# Patient Record
Sex: MALE | Race: WHITE | Employment: FULL TIME | URBAN - METROPOLITAN AREA
[De-identification: names, ages, dates, MRNs, and addresses within clinical notes are randomized per-mention and may not be internally consistent; named-entity substitution may affect disease eponyms.]

---

## 2017-01-10 RX ORDER — LISINOPRIL AND HYDROCHLOROTHIAZIDE 20; 12.5 MG/1; MG/1
TABLET ORAL
Qty: 90 TABLET | Refills: 0 | Status: SHIPPED | OUTPATIENT
Start: 2017-01-10 | End: 2017-02-21 | Stop reason: SDUPTHER

## 2017-02-19 DIAGNOSIS — G47.00 INSOMNIA, UNSPECIFIED TYPE: ICD-10-CM

## 2017-02-20 RX ORDER — ZOLPIDEM TARTRATE 10 MG/1
TABLET ORAL
Qty: 90 TABLET | Refills: 1 | OUTPATIENT
Start: 2017-02-20 | End: 2017-08-07 | Stop reason: SDUPTHER

## 2017-02-21 ENCOUNTER — OFFICE VISIT (OUTPATIENT)
Dept: FAMILY MEDICINE CLINIC | Age: 62
End: 2017-02-21

## 2017-02-21 VITALS
BODY MASS INDEX: 28.23 KG/M2 | HEART RATE: 60 BPM | HEIGHT: 73 IN | WEIGHT: 213 LBS | SYSTOLIC BLOOD PRESSURE: 114 MMHG | DIASTOLIC BLOOD PRESSURE: 70 MMHG

## 2017-02-21 DIAGNOSIS — I10 ESSENTIAL HYPERTENSION, BENIGN: ICD-10-CM

## 2017-02-21 DIAGNOSIS — E78.2 MIXED HYPERLIPIDEMIA: ICD-10-CM

## 2017-02-21 DIAGNOSIS — Z12.5 PROSTATE CANCER SCREENING: ICD-10-CM

## 2017-02-21 DIAGNOSIS — Z00.00 WELL ADULT EXAM: Primary | ICD-10-CM

## 2017-02-21 DIAGNOSIS — R73.01 ELEVATED FASTING GLUCOSE: ICD-10-CM

## 2017-02-21 DIAGNOSIS — M54.9 UPPER BACK PAIN: ICD-10-CM

## 2017-02-21 LAB
A/G RATIO: 2.2 (ref 1.1–2.2)
ALBUMIN SERPL-MCNC: 4.6 G/DL (ref 3.4–5)
ALP BLD-CCNC: 66 U/L (ref 40–129)
ALT SERPL-CCNC: 32 U/L (ref 10–40)
ANION GAP SERPL CALCULATED.3IONS-SCNC: 12 MMOL/L (ref 3–16)
AST SERPL-CCNC: 24 U/L (ref 15–37)
BASOPHILS ABSOLUTE: 0.1 K/UL (ref 0–0.2)
BASOPHILS RELATIVE PERCENT: 1.1 %
BILIRUB SERPL-MCNC: 0.4 MG/DL (ref 0–1)
BILIRUBIN, POC: 0
BLOOD URINE, POC: 0
BUN BLDV-MCNC: 16 MG/DL (ref 7–20)
CALCIUM SERPL-MCNC: 9.2 MG/DL (ref 8.3–10.6)
CHLORIDE BLD-SCNC: 103 MMOL/L (ref 99–110)
CHOLESTEROL, TOTAL: 137 MG/DL (ref 0–199)
CLARITY, POC: CLEAR
CO2: 27 MMOL/L (ref 21–32)
COLOR, POC: YELLOW
CREAT SERPL-MCNC: 0.7 MG/DL (ref 0.8–1.3)
EOSINOPHILS ABSOLUTE: 0.3 K/UL (ref 0–0.6)
EOSINOPHILS RELATIVE PERCENT: 4.9 %
GFR AFRICAN AMERICAN: >60
GFR NON-AFRICAN AMERICAN: >60
GLOBULIN: 2.1 G/DL
GLUCOSE BLD-MCNC: 97 MG/DL (ref 70–99)
GLUCOSE URINE, POC: 0
HCT VFR BLD CALC: 42.3 % (ref 40.5–52.5)
HDLC SERPL-MCNC: 45 MG/DL (ref 40–60)
HEMOGLOBIN: 14.4 G/DL (ref 13.5–17.5)
KETONES, POC: 0
LDL CHOLESTEROL CALCULATED: 81 MG/DL
LEUKOCYTE EST, POC: 0
LYMPHOCYTES ABSOLUTE: 1.7 K/UL (ref 1–5.1)
LYMPHOCYTES RELATIVE PERCENT: 27.5 %
MCH RBC QN AUTO: 29.3 PG (ref 26–34)
MCHC RBC AUTO-ENTMCNC: 34 G/DL (ref 31–36)
MCV RBC AUTO: 86.2 FL (ref 80–100)
MONOCYTES ABSOLUTE: 0.4 K/UL (ref 0–1.3)
MONOCYTES RELATIVE PERCENT: 6.5 %
NEUTROPHILS ABSOLUTE: 3.8 K/UL (ref 1.7–7.7)
NEUTROPHILS RELATIVE PERCENT: 60 %
NITRITE, POC: 0
PDW BLD-RTO: 13.8 % (ref 12.4–15.4)
PH, POC: 5.5
PLATELET # BLD: 193 K/UL (ref 135–450)
PMV BLD AUTO: 8.7 FL (ref 5–10.5)
POTASSIUM SERPL-SCNC: 4.3 MMOL/L (ref 3.5–5.1)
PROSTATE SPECIFIC ANTIGEN: 0.85 NG/ML (ref 0–4)
PROTEIN, POC: 0
RBC # BLD: 4.9 M/UL (ref 4.2–5.9)
SODIUM BLD-SCNC: 142 MMOL/L (ref 136–145)
SPECIFIC GRAVITY, POC: 1.03
TOTAL PROTEIN: 6.7 G/DL (ref 6.4–8.2)
TRIGL SERPL-MCNC: 54 MG/DL (ref 0–150)
UROBILINOGEN, POC: 0.2
VLDLC SERPL CALC-MCNC: 11 MG/DL
WBC # BLD: 6.3 K/UL (ref 4–11)

## 2017-02-21 PROCEDURE — 81002 URINALYSIS NONAUTO W/O SCOPE: CPT | Performed by: FAMILY MEDICINE

## 2017-02-21 PROCEDURE — 36415 COLL VENOUS BLD VENIPUNCTURE: CPT | Performed by: FAMILY MEDICINE

## 2017-02-21 PROCEDURE — 93000 ELECTROCARDIOGRAM COMPLETE: CPT | Performed by: FAMILY MEDICINE

## 2017-02-21 PROCEDURE — 99396 PREV VISIT EST AGE 40-64: CPT | Performed by: FAMILY MEDICINE

## 2017-02-21 RX ORDER — SIMVASTATIN 20 MG
20 TABLET ORAL NIGHTLY
Qty: 90 TABLET | Refills: 1 | Status: SHIPPED | OUTPATIENT
Start: 2017-02-21 | End: 2017-08-29 | Stop reason: SDUPTHER

## 2017-02-21 RX ORDER — LISINOPRIL AND HYDROCHLOROTHIAZIDE 20; 12.5 MG/1; MG/1
TABLET ORAL
Qty: 90 TABLET | Refills: 1 | Status: SHIPPED | OUTPATIENT
Start: 2017-02-21 | End: 2017-09-19 | Stop reason: SDUPTHER

## 2017-02-21 ASSESSMENT — ENCOUNTER SYMPTOMS
COUGH: 0
ABDOMINAL PAIN: 0
BACK PAIN: 1
SHORTNESS OF BREATH: 0
VOMITING: 0
DIARRHEA: 0
EYE PAIN: 0

## 2017-02-22 LAB
ESTIMATED AVERAGE GLUCOSE: 102.5 MG/DL
HBA1C MFR BLD: 5.2 %

## 2017-06-28 ENCOUNTER — TELEPHONE (OUTPATIENT)
Dept: FAMILY MEDICINE CLINIC | Age: 62
End: 2017-06-28

## 2017-08-07 DIAGNOSIS — G47.00 INSOMNIA, UNSPECIFIED TYPE: ICD-10-CM

## 2017-08-07 RX ORDER — ZOLPIDEM TARTRATE 10 MG/1
TABLET ORAL
Qty: 90 TABLET | Refills: 1 | OUTPATIENT
Start: 2017-08-07 | End: 2018-02-01 | Stop reason: SDUPTHER

## 2017-08-25 DIAGNOSIS — M79.604 BILATERAL LEG PAIN: ICD-10-CM

## 2017-08-25 DIAGNOSIS — M79.605 BILATERAL LEG PAIN: ICD-10-CM

## 2017-08-25 RX ORDER — GABAPENTIN 300 MG/1
300 CAPSULE ORAL 3 TIMES DAILY
Qty: 270 CAPSULE | Refills: 3 | Status: SHIPPED | OUTPATIENT
Start: 2017-08-25 | End: 2018-11-05 | Stop reason: SDUPTHER

## 2017-08-29 ENCOUNTER — OFFICE VISIT (OUTPATIENT)
Dept: FAMILY MEDICINE CLINIC | Age: 62
End: 2017-08-29

## 2017-08-29 VITALS
SYSTOLIC BLOOD PRESSURE: 130 MMHG | BODY MASS INDEX: 28.76 KG/M2 | WEIGHT: 215 LBS | HEART RATE: 68 BPM | DIASTOLIC BLOOD PRESSURE: 82 MMHG

## 2017-08-29 DIAGNOSIS — M54.50 ACUTE BILATERAL LOW BACK PAIN WITHOUT SCIATICA: ICD-10-CM

## 2017-08-29 DIAGNOSIS — M54.9 UPPER BACK PAIN, CHRONIC: ICD-10-CM

## 2017-08-29 DIAGNOSIS — I10 ESSENTIAL HYPERTENSION, BENIGN: Primary | ICD-10-CM

## 2017-08-29 DIAGNOSIS — E78.2 MIXED HYPERLIPIDEMIA: ICD-10-CM

## 2017-08-29 DIAGNOSIS — R73.01 ELEVATED FASTING GLUCOSE: ICD-10-CM

## 2017-08-29 DIAGNOSIS — M54.2 NECK PAIN ON LEFT SIDE: ICD-10-CM

## 2017-08-29 DIAGNOSIS — G89.29 UPPER BACK PAIN, CHRONIC: ICD-10-CM

## 2017-08-29 LAB
ANION GAP SERPL CALCULATED.3IONS-SCNC: 15 MMOL/L (ref 3–16)
BUN BLDV-MCNC: 16 MG/DL (ref 7–20)
CALCIUM SERPL-MCNC: 9.5 MG/DL (ref 8.3–10.6)
CHLORIDE BLD-SCNC: 104 MMOL/L (ref 99–110)
CHOLESTEROL, TOTAL: 156 MG/DL (ref 0–199)
CO2: 26 MMOL/L (ref 21–32)
CREAT SERPL-MCNC: 0.7 MG/DL (ref 0.8–1.3)
GFR AFRICAN AMERICAN: >60
GFR NON-AFRICAN AMERICAN: >60
GLUCOSE BLD-MCNC: 104 MG/DL (ref 70–99)
HDLC SERPL-MCNC: 44 MG/DL (ref 40–60)
LDL CHOLESTEROL CALCULATED: 93 MG/DL
POTASSIUM SERPL-SCNC: 4.7 MMOL/L (ref 3.5–5.1)
SODIUM BLD-SCNC: 145 MMOL/L (ref 136–145)
TRIGL SERPL-MCNC: 97 MG/DL (ref 0–150)
VLDLC SERPL CALC-MCNC: 19 MG/DL

## 2017-08-29 PROCEDURE — 99214 OFFICE O/P EST MOD 30 MIN: CPT | Performed by: FAMILY MEDICINE

## 2017-08-29 PROCEDURE — 36415 COLL VENOUS BLD VENIPUNCTURE: CPT | Performed by: FAMILY MEDICINE

## 2017-08-29 RX ORDER — SIMVASTATIN 20 MG
20 TABLET ORAL NIGHTLY
Qty: 90 TABLET | Refills: 1 | Status: SHIPPED | OUTPATIENT
Start: 2017-08-29 | End: 2018-02-23 | Stop reason: SDUPTHER

## 2017-08-29 ASSESSMENT — ENCOUNTER SYMPTOMS
COUGH: 0
SHORTNESS OF BREATH: 0
NAUSEA: 0
BACK PAIN: 1
EYE PAIN: 0
DIARRHEA: 0
ABDOMINAL PAIN: 0

## 2017-08-30 LAB
ESTIMATED AVERAGE GLUCOSE: 105.4 MG/DL
HBA1C MFR BLD: 5.3 %

## 2017-09-19 DIAGNOSIS — I10 ESSENTIAL HYPERTENSION, BENIGN: ICD-10-CM

## 2017-09-20 RX ORDER — LISINOPRIL AND HYDROCHLOROTHIAZIDE 20; 12.5 MG/1; MG/1
TABLET ORAL
Qty: 90 TABLET | Refills: 1 | Status: SHIPPED | OUTPATIENT
Start: 2017-09-20 | End: 2018-03-19 | Stop reason: SDUPTHER

## 2017-09-22 ENCOUNTER — OFFICE VISIT (OUTPATIENT)
Dept: ORTHOPEDIC SURGERY | Age: 62
End: 2017-09-22

## 2017-09-22 VITALS
BODY MASS INDEX: 29.11 KG/M2 | HEIGHT: 72 IN | SYSTOLIC BLOOD PRESSURE: 133 MMHG | DIASTOLIC BLOOD PRESSURE: 85 MMHG | WEIGHT: 214.95 LBS | HEART RATE: 62 BPM

## 2017-09-22 DIAGNOSIS — S29.019A THORACIC MYOFASCIAL STRAIN, INITIAL ENCOUNTER: ICD-10-CM

## 2017-09-22 DIAGNOSIS — M79.18 CERVICAL MYOFASCIAL PAIN SYNDROME: ICD-10-CM

## 2017-09-22 DIAGNOSIS — M54.2 CERVICAL PAIN (NECK): Primary | ICD-10-CM

## 2017-09-22 PROCEDURE — 99203 OFFICE O/P NEW LOW 30 MIN: CPT | Performed by: PHYSICAL MEDICINE & REHABILITATION

## 2017-09-22 PROCEDURE — 72070 X-RAY EXAM THORAC SPINE 2VWS: CPT | Performed by: PHYSICAL MEDICINE & REHABILITATION

## 2017-09-22 PROCEDURE — 72050 X-RAY EXAM NECK SPINE 4/5VWS: CPT | Performed by: PHYSICAL MEDICINE & REHABILITATION

## 2017-10-02 ENCOUNTER — HOSPITAL ENCOUNTER (OUTPATIENT)
Dept: PHYSICAL THERAPY | Age: 62
Discharge: HOME | End: 2017-10-02
Admitting: PHYSICAL MEDICINE & REHABILITATION

## 2017-10-02 ENCOUNTER — HOSPITAL ENCOUNTER (OUTPATIENT)
Dept: PHYSICAL THERAPY | Age: 62
Discharge: HOME | End: 2017-09-30
Admitting: PHYSICAL MEDICINE & REHABILITATION

## 2017-10-02 NOTE — FLOWSHEET NOTE
[facility],  [address] [place]  Phone: [phone]  Fax [phone]      Physical Therapy Daily Treatment Note  Date:  10/2/2017    Patient Name:  Francisca Marie    :  1955  MRN: [id number]  Restrictions/Precautions:    Medical/Treatment Diagnosis Information:  · Diagnosis: Neck Pain (M54.2)  · Treatment Diagnosis: Poor Posture (R29.3), Neck pain (M54.2), thoracic pain (C96.4)  Insurance/Certification information:  PT Insurance Information: [place]  Physician Information:  Referring Practitioner: Roxann [place] of care signed (Y/N):     Date of Patient follow up with Physician:     G-Code (if applicable):      Date G-Code Applied:    PT G-Codes  Functional Assessment Tool Used: NDI  Score: 24%  Functional Limitation: Changing and maintaining body position  Changing and Maintaining Body Position Current Status (): At least 20 percent but less than 40 percent impaired, limited or restricted  Changing and Maintaining Body Position Goal Status ():  At least 1 percent but less than 20 percent impaired, limited or restricted    Progress Note: [x]  Yes  []  No  Next due by: Visit #10      Latex Allergy:  [x]NO      []YES  Preferred Language for Healthcare:   [x]English       []other:    Visit # Insurance Allowable   1 60     Pain level:  2-6/10     SUBJECTIVE:  See eval    OBJECTIVE: See eval  Observation:   Test measurements:      RESTRICTIONS/PRECAUTIONS: None   Muscle  Needle Size Technique Notes IES   Site 1 Right upper trap x 2 0.20 x 40mm [x] Pistoning / []  Threading  []  Winding/Coning Tolerated well []    Site 2 Left upper trap x 2 0.20 x 40mm [x] Pistoning / []  Threading  []  Winding/Coning LTRs; tolerated well  []    Site 3                    [] Pistoning / []  Threading  []  Winding/Coning  []    Site 4                    [] Pistoning / []  Threading  []  Winding/Coning  []    Site 5 [] (78124) Provided verbal/tactile cueing for activities related to improving balance, coordination, kinesthetic sense, posture, motor skill, proprioception  to assist with cervical, scapular, scapulothoracic and UE control with self care, reaching, carrying, lifting, house/yardwork, driving/computer work. Therapeutic Activities:    [] (37035 or 51589) Provided verbal/tactile cueing for activities related to improving balance, coordination, kinesthetic sense, posture, motor skill, proprioception and motor activation to allow for proper function of cervical, scapular, scapulothoracic and UE control with self care, carrying, lifting, driving/computer work.      Home Exercise Program:    [x] (70786) Reviewed/Progressed HEP activities related to strengthening, flexibility, endurance, ROM of cervical, scapular, scapulothoracic and UE control with self care, reaching, carrying, lifting, house/yardwork, driving/computer work  [] (87948) Reviewed/Progressed HEP activities related to improving balance, coordination, kinesthetic sense, posture, motor skill, proprioception of cervical, scapular, scapulothoracic and UE control with self care, reaching, carrying, lifting, house/yardwork, driving/computer work      Manual Treatments:  PROM / STM / Oscillations-Mobs:  G-I, II, III, IV (PA's, Inf., Post.)  [x] (67132) Provided manual therapy to mobilize soft tissue/joints of cervical/CT, scapular GHJ and UE for the purpose of decreasing headache, modulating pain, promoting relaxation,  increasing ROM, reducing/eliminating soft tissue swelling/inflammation/restriction, improving soft tissue extensibility and allowing for proper ROM for normal function with self care, reaching, carrying, lifting, house/yardwork, driving/computer work    Modalities:  IFC/HP x 15' to lower c spine/upper t spine while seated     Charges:  Timed Code Treatment Minutes: 30'   Total Treatment Minutes: 79'     [x] RADAMESAL (LOW) 83148 (typically 20 minutes

## 2017-10-02 NOTE — PLAN OF CARE
[facility], [address]  Phone: [phone]  Fax [phone]   Physical Therapy Certification    Dear Referring Practitioner: Jessi Urrutia,    We had the pleasure of evaluating the following patient for physical therapy services at [address] [place] [address]. A summary of our findings can be found in the initial assessment below. This includes our plan of care. If you have any questions or concerns regarding these findings, please do not hesitate to contact me at the office phone number checked above. Thank you for the referral.       Physician Signature:_______________________________Date:__________________  By signing above (or electronic signature), therapists plan is approved by physician    Patient: Kulwant Rg   : 1955   MRN: [id number]  Referring Physician: Referring Practitioner: Jessi Urrutia      Evaluation Date: 10/2/2017      Medical Diagnosis Information:  Diagnosis: Neck Pain (M54.2)                                             Insurance information: PT Insurance Information: [place]    Precautions/ Contra-indications: None  Latex Allergy:  [x]NO      []YES  Preferred Language for Healthcare:   [x]English       []other:    SUBJECTIVE: Patient stated complaint: Patient reports he has had pain in his neck for about 5-6 years that has gotten worse the past year. 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 Pain located on the left side of his neck and between his shoulder blades. Does not radiate down his arm. Has tried PT, chiropractor, and injections without relief.      Relevant Medical History: C5-C6 fusion  Functional Disability Index:      Pain Scale: 2-6/10  Easing factors: gabapentin  Provocative factors: lifting and carrying things, yardwork, sitting for a long period of time    Type: [x]Constant   []Intermittent []Radiating []Localized []other:     Numbness/Tingling: Denies N/T    Occupation/School: Desk Job     Living Status/Prior Level of Function: Independent with ADLs and IADLs, runs 4-5 miles per day on the treadmill; does yard work    OBJECTIVE:     CERV ROM     Cervical Flexion 25    Cervical Extension 30    Cervical SB 22 16   Cervical rotation 37 40        ROM Left Right   Shoulder Flex 165 165   Shoulder Abd 165 165   Shoulder ER T3 functional T3 functional   Shoulder IR [facility] WFL             Strength  Left Right   Shoulder Flex 4+/5 4+/5   Shoulder Scap 4+/5 4+/5   Shoulder ER/IR 4+/5 4+/5   Elbow flex/ext 4+/5 4+/5   Wrist flex/ext 4+/5 4+/5        Reflexes Left Right   C5-6 Biceps     C5-6 Brachioradialis     C7-8 Triceps       Reflexes/Sensation:    [x]Dermatomes/Myotomes intact    [x]Reflexes equal and normal bilaterally   []Other:    Joint mobility:    []Normal    [x]Hypo; decreased throughout thoracic spine and lower cervical spine; decreased rib mobility on the left. Pain with PAs to mid thoracic spine; decreased mobility B GH joints   []Hyper    Palpation: TTP upper trap, thoracic paraspinals, rhomboids L>R    Functional Mobility/Transfers: Independent    Posture: Poor; forward head, rounded shoulders    Bandages/Dressings/Incisions: NA    Gait: (include devices/WB status): WNL     Orthopedic Special Tests: Spurlings -, Quadrant -                       [x] Patient history, allergies, meds reviewed. Medical chart reviewed. See intake form. Review Of Systems (ROS):  [x]Performed Review of systems (Integumentary, CardioPulmonary, Neurological) by intake and observation. Intake form has been scanned into medical record. Patient has been instructed to contact their primary care physician regarding ROS issues if not already being addressed at this time.       Co-morbidities/Complexities (which will affect course of rehabilitation):   [x]None           Arthritic conditions   []Rheumatoid arthritis movements with/without dizziness   []Abnormal reflexes/sensation/myotomal/dermatomal deficits   [x]Decreased DCF control or ability to hold head up   [x]Decreased RC/scapular/core strength and neuromuscular control    []Decreased UE functional strength   []other:      Functional Activity Limitations (from functional questionnaire and intake)   [x]Reduced ability to tolerate prolonged functional positions   []Reduced ability or difficulty with changes of positions or transfers between positions   [x]Reduced ability to maintain good posture and demonstrate good body mechanics with sitting, bending, and lifting   [x] Reduced ability or tolerance with driving and/or computer work   [x]Reduced ability to perform lifting, reaching, carrying tasks   []Reduced ability to concentrate   []Reduced ability to sleep    [x]Reduced ability to tolerate any impact through UE or spine   []Reduced ability to ambulate prolonged functional periods/distances   []other:    Participation Restrictions   []Reduced participation in self care activities   [x]Reduced participation in home management activities   [x]Reduced participation in work activities   [x]Reduced participation in social activities. [x]Reduced participation in sport/recreational activities.     Classification/Subgrouping:   [x]signs/symptoms consistent with neck pain with mobility deficits     []signs/symptoms consistent with neck pain with movement coordinated impairments    []signs/symptoms consistent with neck pain with radiating pain    []signs/symptoms consistent with neck pain with headaches (cervicogenic)    []Signs/symptoms consistent with nerve root involvement including myotome & dermatome dysfunction   []sign/symptoms consistent with facet dysfunction of cervical and thoracic spine    []signs/symptoms consistent suggesting central cord compression/UMN syndromes   []signs/symptoms consistent with discogenic cervical pain   []signs/symptoms consistent with rib dysfunction   [x]signs/symptoms consistent with postural dysfunction   []signs/symptoms consistent with shoulder pathology    []signs/symptoms consistent with post-surgical status including decreased ROM, strength and function. [x]signs/symptoms consistent with pathology which may benefit from Dry Needling   []signs/symptoms which may limit the use of advanced manual therapy techniques: (Elevated CV risk profile, recent trauma, intolerance to end range positions, prior TIA, visual issues, UE neurological compromise )     Prognosis/Rehab Potential:      []Excellent   []Good    [x]Fair   []Poor    Tolerance of evaluation/treatment:    []Excellent   []Good    [x]Fair   []Poor    Physical Therapy Evaluation Complexity Justification  [x] A history of present problem with:  [x] no personal factors and/or comorbidities that impact the plan of care;  []1-2 personal factors and/or comorbidities that impact the plan of care  []3 personal factors and/or comorbidities that impact the plan of care  [x] An examination of body systems using standardized tests and measures addressing any of the following: body structures and functions (impairments), activity limitations, and/or participation restrictions;:  [] a total of 1-2 or more elements   [] a total of 3 or more elements   [x] a total of 4 or more elements   [x] A clinical presentation with:  [x] stable and/or uncomplicated characteristics   [] evolving clinical presentation with changing characteristics  [] unstable and unpredictable characteristics;   [x] Clinical decision making of [x] low, [] moderate, [] high complexity using standardized patient assessment instrument and/or measurable assessment of functional outcome.     [x] EVAL (LOW) 49382 (typically 20 minutes face-to-face)  [] EVAL (MOD) 38398 (typically 30 minutes face-to-face)  [] EVAL (HIGH) 18746 (typically 45 minutes face-to-face)  [] RE-EVAL     PLAN:   Frequency/Duration:  2 days per week for 8 Weeks:  Interventions:  [x]  Therapeutic exercise including: strength training, ROM, for cervical spine,scapula, core and Upper extremity, including postural re-education. [x]  NMR activation and proprioception for Deep cervical flexors, periscapular and RC muscles and Core, including postural re-education. [x]  Manual therapy as indicated for C/T spine, ribs, Soft tissue to include: Dry Needling/IASTM, STM, PROM, Gr I-IV mobilizations, manipulation. [x] Modalities as needed that may include: thermal agents, E-stim, Biofeedback, US, iontophoresis as indicated  [x] Patient education on joint protection, postural re-education, activity modification, progression of HEP. HEP instruction: Handout given to patient this date (see flowsheet)    GOALS:  Patient stated goal: \"To have less pain. \"    Therapist goals for Patient:   Short Term Goals: To be achieved in: 2 weeks  1. Independent in HEP and progression per patient tolerance, in order to prevent re-injury. 2. Patient will have a decrease in pain to facilitate improvement in movement, function, and ADLs as indicated by Functional Deficits. Long Term Goals: To be achieved in: 8 weeks  1. Disability index score of 12% or less for the NDI to assist with reaching prior level of function. 2. Patient will demonstrate increased AROM of cervical spine SB to 30 degrees bilaterally and cervical rotation 55 deg bilaterally to allow for proper joint functioning as indicated by patients Functional Deficits. 3. Patient will demonstrate an increase in postural awareness and control and activation of  Deep cervical stabilizers to allow for proper functional mobility as indicated by patients Functional Deficits. 4. Patient will be able to sit for 2 hours without an onset of pain between his shoulder blades. (patient specific functional goal)     5. Patient will be able to return to upper body strength training.     Electronically signed by:  Philip Jin PT, DPT [id number]

## 2017-10-06 ENCOUNTER — HOSPITAL ENCOUNTER (OUTPATIENT)
Dept: PHYSICAL THERAPY | Age: 62
Discharge: HOME | End: 2017-10-06
Admitting: PHYSICAL MEDICINE & REHABILITATION

## 2017-10-06 NOTE — FLOWSHEET NOTE
Chin tuck w lift      Chin tuck w horiz add w/ pilates ring 5\" 10 x 2 Holding tuck through whole second set   Doorway pec stretch 30\" 3    No money w/ OVL 2 10 x 5\"    Manual Intervention      Cerv mobs/manip      Thoracic mobs/manip 5'     CT manip      Rib mobilizations      STM upper traps 5'               NMR re-education      T-spine Ext- foam roll      Chin tucks                         Traction                      Therapeutic Exercise and NMR EXR  [x] (68194) Provided verbal/tactile cueing for activities related to strengthening, flexibility, endurance, ROM  for improvements in cervical, postural, scapular, scapulothoracic and UE control with self care, reaching, carrying, lifting, house/yardwork, driving/computer work.    [] (67211) Provided verbal/tactile cueing for activities related to improving balance, coordination, kinesthetic sense, posture, motor skill, proprioception  to assist with cervical, scapular, scapulothoracic and UE control with self care, reaching, carrying, lifting, house/yardwork, driving/computer work. Therapeutic Activities:    [] (61462 or 32139) Provided verbal/tactile cueing for activities related to improving balance, coordination, kinesthetic sense, posture, motor skill, proprioception and motor activation to allow for proper function of cervical, scapular, scapulothoracic and UE control with self care, carrying, lifting, driving/computer work.      Home Exercise Program:    [x] (75111) Reviewed/Progressed HEP activities related to strengthening, flexibility, endurance, ROM of cervical, scapular, scapulothoracic and UE control with self care, reaching, carrying, lifting, house/yardwork, driving/computer work  [] (50523) Reviewed/Progressed HEP activities related to improving balance, coordination, kinesthetic sense, posture, motor skill, proprioception of cervical, scapular, scapulothoracic and UE control with self care, reaching, carrying, lifting, house/yardwork, driving/computer work      Manual Treatments:  PROM / STM / Oscillations-Mobs:  G-I, II, III, IV (PA's, Inf., Post.)  [x] (80178) Provided manual therapy to mobilize soft tissue/joints of cervical/CT, scapular GHJ and UE for the purpose of decreasing headache, modulating pain, promoting relaxation,  increasing ROM, reducing/eliminating soft tissue swelling/inflammation/restriction, improving soft tissue extensibility and allowing for proper ROM for normal function with self care, reaching, carrying, lifting, house/yardwork, driving/computer work    Modalities: Declined stim today, Moist hot pack to L UT      Charges:  Timed Code Treatment Minutes: 44'   Total Treatment Minutes: 52'     [] EVAL (LOW) 94652 (typically 20 minutes face-to-face)  [] EVAL (MOD) 80176 (typically 30 minutes face-to-face)  [] EVAL (HIGH) 46312 (typically 45 minutes face-to-face)  [] RE-EVAL     [x] AW(72022) x  2   [] IONTO  [] NMR (70752) x      [] VASO  [x] Manual (80064) x  1    [] Other:  [] TA x       [] Mech Traction (15255)  [] ES(attended) (24024)      [] ES (un) (00952):     GOALS:  Short Term Goals: To be achieved in: 2 weeks  1. Independent in HEP and progression per patient tolerance, in order to prevent re-injury. 2. Patient will have a decrease in pain to facilitate improvement in movement, function, and ADLs as indicated by Functional Deficits.     Long Term Goals: To be achieved in: 8 weeks  1. Disability index score of 12% or less for the NDI to assist with reaching prior level of function. 2. Patient will demonstrate increased AROM of cervical spine SB to 30 degrees bilaterally and cervical rotation 55 deg bilaterally to allow for proper joint functioning as indicated by patients Functional Deficits. 3. Patient will demonstrate an increase in postural awareness and control and activation of  Deep cervical stabilizers to allow for proper functional mobility as indicated by patients Functional Deficits.    4. Patient

## 2017-10-10 ENCOUNTER — HOSPITAL ENCOUNTER (OUTPATIENT)
Dept: PHYSICAL THERAPY | Age: 62
Discharge: HOME | End: 2017-10-10
Admitting: PHYSICAL MEDICINE & REHABILITATION

## 2017-10-10 NOTE — FLOWSHEET NOTE
Ex Sets/sec Reps Notes      T- band Row/pinch 5\" 2 x 10    T- band lower pinch 5\" 2 x 10 YTB   T- band ER activation         Levator stretch 30\"  3 B    Finisher ladders  Finisher 1/2 arc 1  1 15  15x ea    Quadruped w cerv retract      Front plank      Side plank      Chin tuck      Doorway/True stretch flexion hang 30\" 3    Holding tuck through whole second set   Doorway pec stretch 30\" 3    No money w/ OVL 2 10 x 5\"    Manual Intervention      Cerv mobs/manip      Thoracic mobs/manip 8'     CT manip      Rib mobilizations 2'     STM upper traps 5'         Upper trap stretch B 3'     NMR re-education      T-spine Ext- foam roll      Chin tucks                         Traction                      Therapeutic Exercise and NMR EXR  [x] (65475) Provided verbal/tactile cueing for activities related to strengthening, flexibility, endurance, ROM  for improvements in cervical, postural, scapular, scapulothoracic and UE control with self care, reaching, carrying, lifting, house/yardwork, driving/computer work.    [] (29164) Provided verbal/tactile cueing for activities related to improving balance, coordination, kinesthetic sense, posture, motor skill, proprioception  to assist with cervical, scapular, scapulothoracic and UE control with self care, reaching, carrying, lifting, house/yardwork, driving/computer work. Therapeutic Activities:    [] (73064 or 12782) Provided verbal/tactile cueing for activities related to improving balance, coordination, kinesthetic sense, posture, motor skill, proprioception and motor activation to allow for proper function of cervical, scapular, scapulothoracic and UE control with self care, carrying, lifting, driving/computer work.      Home Exercise Program:    [x] (54476) Reviewed/Progressed HEP activities related to strengthening, flexibility, endurance, ROM of cervical, scapular, scapulothoracic and UE control with self care, reaching, carrying, lifting, house/yardwork, for proper joint functioning as indicated by patients Functional Deficits. 3. Patient will demonstrate an increase in postural awareness and control and activation of  Deep cervical stabilizers to allow for proper functional mobility as indicated by patients Functional Deficits. 4. Patient will be able to sit for 2 hours without an onset of pain between his shoulder blades. (patient specific functional goal)                   5. Patient will be able to return to upper body strength training. Progression Towards Functional goals:  [] Patient is progressing as expected towards functional goals listed. [x] Progression is slowed due to complexities listed. [] Progression has been slowed due to co-morbidities. [] Plan just implemented, too soon to assess goals progression  [] Other:     ASSESSMENT:  Patient tolerated TE well without reports of pain. Some palpable tightness in upper trap but most tightness noted in t spine. Will monitor patient's soreness in regards to needling.    Treatment/Activity Tolerance:  [x] Patient tolerated treatment well [] Patient limited by fatique  [] Patient limited by pain  [] Patient limited by other medical complications  [] Other:     Prognosis: [] Good [x] Fair  [] Poor    Patient Requires Follow-up: [x] Yes  [] No    PLAN: Reformer work for core/UE strength  [x] Continue per plan of care [] Alter current plan (see comments)  [] Plan of care initiated [] Hold pending MD visit [] Discharge    Electronically signed by: Yahir Maurer, PT, DPT [id number]

## 2017-11-01 ENCOUNTER — HOSPITAL ENCOUNTER (OUTPATIENT)
Dept: PHYSICAL THERAPY | Age: 62
Discharge: HOME | End: 2017-11-30
Attending: PHYSICAL MEDICINE & REHABILITATION | Admitting: PHYSICAL MEDICINE & REHABILITATION

## 2018-02-23 ENCOUNTER — OFFICE VISIT (OUTPATIENT)
Dept: FAMILY MEDICINE CLINIC | Age: 63
End: 2018-02-23

## 2018-02-23 VITALS
SYSTOLIC BLOOD PRESSURE: 114 MMHG | DIASTOLIC BLOOD PRESSURE: 70 MMHG | WEIGHT: 216 LBS | HEART RATE: 60 BPM | BODY MASS INDEX: 29.26 KG/M2 | HEIGHT: 72 IN

## 2018-02-23 DIAGNOSIS — G47.00 INSOMNIA, UNSPECIFIED TYPE: ICD-10-CM

## 2018-02-23 DIAGNOSIS — Z00.00 WELL ADULT EXAM: Primary | ICD-10-CM

## 2018-02-23 DIAGNOSIS — Z12.5 PROSTATE CANCER SCREENING: ICD-10-CM

## 2018-02-23 DIAGNOSIS — E78.2 MIXED HYPERLIPIDEMIA: ICD-10-CM

## 2018-02-23 LAB
A/G RATIO: 2 (ref 1.1–2.2)
ALBUMIN SERPL-MCNC: 4.8 G/DL (ref 3.4–5)
ALP BLD-CCNC: 59 U/L (ref 40–129)
ALT SERPL-CCNC: 25 U/L (ref 10–40)
ANION GAP SERPL CALCULATED.3IONS-SCNC: 11 MMOL/L (ref 3–16)
AST SERPL-CCNC: 21 U/L (ref 15–37)
BASOPHILS ABSOLUTE: 0.1 K/UL (ref 0–0.2)
BASOPHILS RELATIVE PERCENT: 0.8 %
BILIRUB SERPL-MCNC: 0.6 MG/DL (ref 0–1)
BILIRUBIN, POC: 0
BLOOD URINE, POC: 0
BUN BLDV-MCNC: 17 MG/DL (ref 7–20)
CALCIUM SERPL-MCNC: 9.8 MG/DL (ref 8.3–10.6)
CHLORIDE BLD-SCNC: 102 MMOL/L (ref 99–110)
CHOLESTEROL, TOTAL: 167 MG/DL (ref 0–199)
CLARITY, POC: CLEAR
CO2: 30 MMOL/L (ref 21–32)
COLOR, POC: YELLOW
CREAT SERPL-MCNC: 0.8 MG/DL (ref 0.8–1.3)
EOSINOPHILS ABSOLUTE: 0.3 K/UL (ref 0–0.6)
EOSINOPHILS RELATIVE PERCENT: 3.5 %
GFR AFRICAN AMERICAN: >60
GFR NON-AFRICAN AMERICAN: >60
GLOBULIN: 2.4 G/DL
GLUCOSE BLD-MCNC: 100 MG/DL (ref 70–99)
GLUCOSE URINE, POC: 0
HCT VFR BLD CALC: 42.4 % (ref 40.5–52.5)
HDLC SERPL-MCNC: 41 MG/DL (ref 40–60)
HEMOGLOBIN: 14.9 G/DL (ref 13.5–17.5)
KETONES, POC: 0
LDL CHOLESTEROL CALCULATED: 102 MG/DL
LEUKOCYTE EST, POC: 0
LYMPHOCYTES ABSOLUTE: 1.9 K/UL (ref 1–5.1)
LYMPHOCYTES RELATIVE PERCENT: 25.3 %
MCH RBC QN AUTO: 30.1 PG (ref 26–34)
MCHC RBC AUTO-ENTMCNC: 35.2 G/DL (ref 31–36)
MCV RBC AUTO: 85.6 FL (ref 80–100)
MONOCYTES ABSOLUTE: 0.5 K/UL (ref 0–1.3)
MONOCYTES RELATIVE PERCENT: 6.9 %
NEUTROPHILS ABSOLUTE: 4.7 K/UL (ref 1.7–7.7)
NEUTROPHILS RELATIVE PERCENT: 63.5 %
NITRITE, POC: 0
PDW BLD-RTO: 13.9 % (ref 12.4–15.4)
PH, POC: 6
PLATELET # BLD: 227 K/UL (ref 135–450)
PMV BLD AUTO: 8.6 FL (ref 5–10.5)
POTASSIUM SERPL-SCNC: 4.6 MMOL/L (ref 3.5–5.1)
PROSTATE SPECIFIC ANTIGEN: 0.72 NG/ML (ref 0–4)
PROTEIN, POC: 0
RBC # BLD: 4.95 M/UL (ref 4.2–5.9)
SODIUM BLD-SCNC: 143 MMOL/L (ref 136–145)
SPECIFIC GRAVITY, POC: 1.02
TOTAL PROTEIN: 7.2 G/DL (ref 6.4–8.2)
TRIGL SERPL-MCNC: 118 MG/DL (ref 0–150)
UROBILINOGEN, POC: 0.2
VLDLC SERPL CALC-MCNC: 24 MG/DL
WBC # BLD: 7.4 K/UL (ref 4–11)

## 2018-02-23 PROCEDURE — 36415 COLL VENOUS BLD VENIPUNCTURE: CPT | Performed by: FAMILY MEDICINE

## 2018-02-23 PROCEDURE — 99396 PREV VISIT EST AGE 40-64: CPT | Performed by: FAMILY MEDICINE

## 2018-02-23 PROCEDURE — 81002 URINALYSIS NONAUTO W/O SCOPE: CPT | Performed by: FAMILY MEDICINE

## 2018-02-23 RX ORDER — SIMVASTATIN 20 MG
20 TABLET ORAL NIGHTLY
Qty: 90 TABLET | Refills: 1 | Status: SHIPPED | OUTPATIENT
Start: 2018-02-23 | End: 2018-08-23 | Stop reason: SDUPTHER

## 2018-02-23 RX ORDER — ZOLPIDEM TARTRATE 10 MG/1
TABLET ORAL
Qty: 90 TABLET | Refills: 1 | Status: SHIPPED | OUTPATIENT
Start: 2018-02-23 | End: 2018-08-23 | Stop reason: SDUPTHER

## 2018-02-23 ASSESSMENT — PATIENT HEALTH QUESTIONNAIRE - PHQ9
1. LITTLE INTEREST OR PLEASURE IN DOING THINGS: 0
SUM OF ALL RESPONSES TO PHQ QUESTIONS 1-9: 0
2. FEELING DOWN, DEPRESSED OR HOPELESS: 0
SUM OF ALL RESPONSES TO PHQ9 QUESTIONS 1 & 2: 0

## 2018-02-24 LAB
ESTIMATED AVERAGE GLUCOSE: 105.4 MG/DL
HBA1C MFR BLD: 5.3 %

## 2018-02-24 ASSESSMENT — ENCOUNTER SYMPTOMS
CONSTIPATION: 0
SHORTNESS OF BREATH: 0
EYE PAIN: 0
NAUSEA: 0
BACK PAIN: 1
COUGH: 0
ABDOMINAL PAIN: 0

## 2018-02-25 NOTE — PATIENT INSTRUCTIONS
Advise low-fat and low sweets diet, also continue excellent regular exercise as you're doing. If labs stable, and overall feeling well, then repeat fasting office visit in one year, sooner as needed.

## 2018-03-19 DIAGNOSIS — I10 ESSENTIAL HYPERTENSION, BENIGN: ICD-10-CM

## 2018-03-19 NOTE — TELEPHONE ENCOUNTER
Papi Sena is requesting refill(s)   Last OV 2/23/18 cpe  (pertaining to medication)  LR 9/20/17 (per medication requested)  Next office visit scheduled or attempted No   If no, reason:  No was just in

## 2018-03-21 RX ORDER — LISINOPRIL AND HYDROCHLOROTHIAZIDE 20; 12.5 MG/1; MG/1
TABLET ORAL
Qty: 90 TABLET | Refills: 1 | Status: SHIPPED | OUTPATIENT
Start: 2018-03-21 | End: 2018-03-21 | Stop reason: CLARIF

## 2018-03-21 RX ORDER — LISINOPRIL AND HYDROCHLOROTHIAZIDE 20; 12.5 MG/1; MG/1
TABLET ORAL
Qty: 90 TABLET | Refills: 1 | Status: SHIPPED | OUTPATIENT
Start: 2018-03-21 | End: 2018-08-23 | Stop reason: SDUPTHER

## 2018-07-23 ENCOUNTER — OFFICE VISIT (OUTPATIENT)
Dept: FAMILY MEDICINE CLINIC | Age: 63
End: 2018-07-23

## 2018-07-23 ENCOUNTER — TELEPHONE (OUTPATIENT)
Dept: FAMILY MEDICINE CLINIC | Age: 63
End: 2018-07-23

## 2018-07-23 VITALS
OXYGEN SATURATION: 98 % | RESPIRATION RATE: 16 BRPM | HEIGHT: 72 IN | SYSTOLIC BLOOD PRESSURE: 146 MMHG | WEIGHT: 222.2 LBS | HEART RATE: 56 BPM | BODY MASS INDEX: 30.1 KG/M2 | DIASTOLIC BLOOD PRESSURE: 80 MMHG

## 2018-07-23 DIAGNOSIS — M54.50 ACUTE BILATERAL LOW BACK PAIN WITHOUT SCIATICA: Primary | ICD-10-CM

## 2018-07-23 PROCEDURE — 99213 OFFICE O/P EST LOW 20 MIN: CPT | Performed by: FAMILY MEDICINE

## 2018-07-23 RX ORDER — CYCLOBENZAPRINE HCL 10 MG
5-10 TABLET ORAL 3 TIMES DAILY PRN
Qty: 30 TABLET | Refills: 1 | Status: SHIPPED | OUTPATIENT
Start: 2018-07-23 | End: 2018-08-02

## 2018-07-23 ASSESSMENT — ENCOUNTER SYMPTOMS: BACK PAIN: 1

## 2018-07-23 NOTE — PROGRESS NOTES
Subjective:      Patient ID: Tita Dalton is a 66 y.o. male. Back Pain   Pertinent negatives include no abdominal pain, chest pain or fever. 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 By [place] am, really hard to get up and gt to the bathroom. 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 This morning not as bad, could get up to bathroom easier, and still tight/painful. Tried icy hot patches, no help, also heating pad Sat evening, no real help either. Didn't try ice. Location bilateral lower back. 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 Meds - nothing except regular meds. Sat night roughest night, especially if tries to turn in bed, last night fairly bearable. Sat into Sunday rough. Has used muscle relaxants. Shingles - what about   116/78, R. Review of Systems   Constitutional: Negative for chills and fever. Respiratory: Negative for shortness of breath. Cardiovascular: Negative for chest pain and palpitations. Gastrointestinal: Negative for abdominal pain. Musculoskeletal: Positive for back pain. Objective:   Physical Exam   Constitutional: He appears well-developed and well-nourished. Neck: Neck supple. No tracheal deviation present. Cardiovascular: Normal rate, regular rhythm, normal heart sounds and intact distal pulses. Pulmonary/Chest: Effort normal and breath sounds normal. No respiratory distress. Abdominal: Soft. Bowel sounds are normal.   Neurological: He is alert. Nursing note and vitals reviewed. Assessment:      Encounter Diagnosis   Name Primary?  Acute bilateral low back pain without sciatica Yes         Plan:      Per orders.   Is very taking Aleve 2 tablets twice daily with food - can increase slightly, to 2.5 tablets twice daily with meals, also add

## 2018-07-23 NOTE — TELEPHONE ENCOUNTER
Patient called stating he was told by Dr. Perla Hernández at today's visit that he was going to send a muscle relaxer and an anti-inflammatory to his  Home	[place]. I advised patient that he did send flexeril to [facility] [place] which is a muscle relaxer. He is asking if you are still going to send over an anti-inflammatory.

## 2018-07-24 ASSESSMENT — ENCOUNTER SYMPTOMS
ABDOMINAL PAIN: 0
SHORTNESS OF BREATH: 0

## 2018-08-23 ENCOUNTER — OFFICE VISIT (OUTPATIENT)
Dept: FAMILY MEDICINE CLINIC | Age: 63
End: 2018-08-23

## 2018-08-23 VITALS
BODY MASS INDEX: 29.7 KG/M2 | SYSTOLIC BLOOD PRESSURE: 140 MMHG | HEART RATE: 68 BPM | WEIGHT: 219 LBS | DIASTOLIC BLOOD PRESSURE: 86 MMHG

## 2018-08-23 DIAGNOSIS — R73.01 ELEVATED FASTING GLUCOSE: ICD-10-CM

## 2018-08-23 DIAGNOSIS — G47.00 INSOMNIA, UNSPECIFIED TYPE: ICD-10-CM

## 2018-08-23 DIAGNOSIS — M54.50 ACUTE BILATERAL LOW BACK PAIN WITHOUT SCIATICA: ICD-10-CM

## 2018-08-23 DIAGNOSIS — Z23 NEED FOR ZOSTER VACCINATION: ICD-10-CM

## 2018-08-23 DIAGNOSIS — I10 ESSENTIAL HYPERTENSION, BENIGN: Primary | ICD-10-CM

## 2018-08-23 DIAGNOSIS — B35.1 ONYCHOMYCOSIS: ICD-10-CM

## 2018-08-23 DIAGNOSIS — E78.2 MIXED HYPERLIPIDEMIA: ICD-10-CM

## 2018-08-23 DIAGNOSIS — G60.9 IDIOPATHIC PERIPHERAL NEUROPATHY: ICD-10-CM

## 2018-08-23 LAB
A/G RATIO: 2 (ref 1.1–2.2)
ALBUMIN SERPL-MCNC: 4.5 G/DL (ref 3.4–5)
ALP BLD-CCNC: 64 U/L (ref 40–129)
ALT SERPL-CCNC: 21 U/L (ref 10–40)
ANION GAP SERPL CALCULATED.3IONS-SCNC: 11 MMOL/L (ref 3–16)
AST SERPL-CCNC: 20 U/L (ref 15–37)
BILIRUB SERPL-MCNC: 0.6 MG/DL (ref 0–1)
BUN BLDV-MCNC: 15 MG/DL (ref 7–20)
CALCIUM SERPL-MCNC: 9.4 MG/DL (ref 8.3–10.6)
CHLORIDE BLD-SCNC: 104 MMOL/L (ref 99–110)
CHOLESTEROL, TOTAL: 144 MG/DL (ref 0–199)
CO2: 29 MMOL/L (ref 21–32)
CREAT SERPL-MCNC: 0.8 MG/DL (ref 0.8–1.3)
GFR AFRICAN AMERICAN: >60
GFR NON-AFRICAN AMERICAN: >60
GLOBULIN: 2.2 G/DL
GLUCOSE BLD-MCNC: 106 MG/DL (ref 70–99)
HDLC SERPL-MCNC: 36 MG/DL (ref 40–60)
LDL CHOLESTEROL CALCULATED: 85 MG/DL
POTASSIUM SERPL-SCNC: 4.7 MMOL/L (ref 3.5–5.1)
SODIUM BLD-SCNC: 144 MMOL/L (ref 136–145)
TOTAL PROTEIN: 6.7 G/DL (ref 6.4–8.2)
TRIGL SERPL-MCNC: 113 MG/DL (ref 0–150)
VLDLC SERPL CALC-MCNC: 23 MG/DL

## 2018-08-23 PROCEDURE — 90750 HZV VACC RECOMBINANT IM: CPT | Performed by: FAMILY MEDICINE

## 2018-08-23 PROCEDURE — 90471 IMMUNIZATION ADMIN: CPT | Performed by: FAMILY MEDICINE

## 2018-08-23 PROCEDURE — 99214 OFFICE O/P EST MOD 30 MIN: CPT | Performed by: FAMILY MEDICINE

## 2018-08-23 RX ORDER — ZOLPIDEM TARTRATE 10 MG/1
TABLET ORAL
Qty: 90 TABLET | Refills: 1 | Status: SHIPPED | OUTPATIENT
Start: 2018-08-23 | End: 2018-11-05

## 2018-08-23 RX ORDER — LISINOPRIL AND HYDROCHLOROTHIAZIDE 20; 12.5 MG/1; MG/1
TABLET ORAL
Qty: 90 TABLET | Refills: 1 | Status: SHIPPED | OUTPATIENT
Start: 2018-08-23 | End: 2019-02-19 | Stop reason: SDUPTHER

## 2018-08-23 RX ORDER — TERBINAFINE HYDROCHLORIDE 250 MG/1
250 TABLET ORAL DAILY
Qty: 90 TABLET | Refills: 0 | Status: SHIPPED | OUTPATIENT
Start: 2018-08-23 | End: 2018-11-21

## 2018-08-23 RX ORDER — SIMVASTATIN 20 MG
20 TABLET ORAL NIGHTLY
Qty: 90 TABLET | Refills: 1 | Status: SHIPPED | OUTPATIENT
Start: 2018-08-23 | End: 2019-02-25 | Stop reason: SDUPTHER

## 2018-08-23 ASSESSMENT — ENCOUNTER SYMPTOMS
BACK PAIN: 1
EYE PAIN: 0
COUGH: 0
ABDOMINAL PAIN: 0
CONSTIPATION: 0
NAUSEA: 0
SHORTNESS OF BREATH: 0

## 2018-08-23 NOTE — PROGRESS NOTES
Subjective:      Patient ID: Diana Torres is a 66 y.o. male. HPI   FU for HTN and HLD. States back pain has improved some, not a lot. Doesn't want to see specialist at this point, but if not much better by Oct, then to consider additional evaluation - previously PT and chiropractor, and no numbness/tingling down arm or leg. Usually does 4 miles \"running\" per day, with no problems on elliptical, but treadmill may aggrevate things. Now wearing a back brace when working on projects outside - still fair amount to do. Shingles vaccine - wants to start that. Neurontin - back to 2/day, helps with the lower leg sxs, bilateral shin pain, and may cut back to 1/day. Sleep ok with the Ambien, only way he can get to sleep per patient. Bed 9-10am, always awake about 3, and usually can eventually get back to sleep. Diet - [facility], likes veggies/fruits, has own tomatoes this year, done well, had 40 tomato plants, lots pasta sauce, few sweets, and one can pop/week. Work out 1 hr/day, no more lifting, all cardio now, 5 days/week, also projects at home. Usually better BP after works out. R great toenail almost lost it, L not quite as bad. Wants treatment. Review of Systems   Constitutional: Negative for chills and fever. HENT: Negative for ear pain and hearing loss. Eyes: Negative for pain and visual disturbance. Respiratory: Negative for cough and shortness of breath. Cardiovascular: Negative for chest pain and palpitations. Gastrointestinal: Negative for abdominal pain, constipation and nausea. Genitourinary: Negative for dysuria and hematuria. Musculoskeletal: Positive for back pain. Negative for gait problem. Neurological: Negative for dizziness, tremors and numbness. Psychiatric/Behavioral: Positive for sleep disturbance. Negative for dysphoric mood. The patient is not nervous/anxious. Objective:   Physical Exam   Constitutional: He is oriented to person, place, and time.  He appears

## 2018-08-24 LAB
ESTIMATED AVERAGE GLUCOSE: 105.4 MG/DL
HBA1C MFR BLD: 5.3 %

## 2018-08-24 NOTE — PATIENT INSTRUCTIONS
Blood pressure little hard than usual, but likely stable, continue present medication. Advise low-fat and low sweets diet, also continue regular exercise as you're doing. Regarding apparent fungal infection both great toenails, if liver tests are stable, then okay to start the generic Lamisil or terbinafine as of Friday morning. If labs stable, and overall feeling well, and repeat fasting office visit in 6 months, sooner as needed.

## 2018-08-31 ENCOUNTER — TELEPHONE (OUTPATIENT)
Dept: FAMILY MEDICINE CLINIC | Age: 63
End: 2018-08-31

## 2018-08-31 NOTE — TELEPHONE ENCOUNTER
Patient returned call. I advised the PA has been sent to the pre auth department and we are awaiting approval.  She understood.

## 2018-09-05 NOTE — TELEPHONE ENCOUNTER
Patients wife called again regarding the PA. Please let patient know when the medication is approved.

## 2018-09-05 NOTE — TELEPHONE ENCOUNTER
Patients wife states patient is out of medication and the PA has been routed to the PA department twice. She would like this taken care of as soon as possible as the patient can not sleep without medication.  Please advise [phone]

## 2018-11-05 ENCOUNTER — OFFICE VISIT (OUTPATIENT)
Dept: FAMILY MEDICINE CLINIC | Age: 63
End: 2018-11-05
Payer: COMMERCIAL

## 2018-11-05 VITALS
DIASTOLIC BLOOD PRESSURE: 88 MMHG | OXYGEN SATURATION: 97 % | HEART RATE: 69 BPM | BODY MASS INDEX: 29.1 KG/M2 | WEIGHT: 219.6 LBS | HEIGHT: 73 IN | SYSTOLIC BLOOD PRESSURE: 130 MMHG

## 2018-11-05 DIAGNOSIS — G47.00 INSOMNIA, UNSPECIFIED TYPE: Primary | ICD-10-CM

## 2018-11-05 PROCEDURE — 90471 IMMUNIZATION ADMIN: CPT | Performed by: PHYSICIAN ASSISTANT

## 2018-11-05 PROCEDURE — 90670 PCV13 VACCINE IM: CPT | Performed by: PHYSICIAN ASSISTANT

## 2018-11-05 PROCEDURE — 99213 OFFICE O/P EST LOW 20 MIN: CPT | Performed by: PHYSICIAN ASSISTANT

## 2018-11-05 RX ORDER — GABAPENTIN 300 MG/1
300 CAPSULE ORAL DAILY
Qty: 30 CAPSULE | Refills: 0
Start: 2018-11-05 | End: 2020-02-12 | Stop reason: SDUPTHER

## 2018-11-05 ASSESSMENT — ENCOUNTER SYMPTOMS: RESPIRATORY NEGATIVE: 1

## 2018-11-05 NOTE — PROGRESS NOTES
Subjective:      Patient ID: Benito Rivas is a 66 y.o. male. HPI  Patient presents with concerns regarding his sleeping medication. He has been on [place] for 12 years. It will allow him to fall asleep and will get maybe 5 hours then is awake. More recently he has noticed a lot of daytime sleepiness, loss of focus, doesn't feel quite clear on tasks. He has also been on neurontin but has weaned that down to once daily. He can not sleep without the [place] but feels it may be adding to some of these issues. Review of Systems   Constitutional: Positive for fatigue. Respiratory: Negative. Cardiovascular: Negative. Neurological: Negative. Psychiatric/Behavioral: Positive for decreased concentration and sleep disturbance. Objective:   Physical Exam   Constitutional: He is oriented to person, place, and time. He appears well-developed and well-nourished. Cardiovascular: Normal rate and regular rhythm. Pulmonary/Chest: Effort normal and breath sounds normal.   Neurological: He is alert and oriented to person, place, and time. Psychiatric: He has a normal mood and affect. Assessment:       Diagnosis Orders   1. Insomnia, unspecified type  Suvorexant (BELSOMRA) 10 MG TABS    Suvorexant (BELSOMRA) 15 MG TABS    Suvorexant (BELSOMRA) 20 MG TABS           Plan: Will try belsomra in place of the [place]. He is to call in the next 2 weeks for update on effectiveness.          NURYS Paredes

## 2018-11-07 ENCOUNTER — TELEPHONE (OUTPATIENT)
Dept: FAMILY MEDICINE CLINIC | Age: 63
End: 2018-11-07

## 2018-11-14 ENCOUNTER — TELEPHONE (OUTPATIENT)
Dept: FAMILY MEDICINE CLINIC | Age: 63
End: 2018-11-14

## 2018-11-20 DIAGNOSIS — M79.605 BILATERAL LEG PAIN: ICD-10-CM

## 2018-11-20 DIAGNOSIS — M79.604 BILATERAL LEG PAIN: ICD-10-CM

## 2018-11-26 RX ORDER — GABAPENTIN 300 MG/1
CAPSULE ORAL
Qty: 270 CAPSULE | Refills: 3 | Status: SHIPPED | OUTPATIENT
Start: 2018-11-26 | End: 2018-12-21 | Stop reason: SDUPTHER

## 2018-11-30 ENCOUNTER — OFFICE VISIT (OUTPATIENT)
Dept: FAMILY MEDICINE CLINIC | Age: 63
End: 2018-11-30
Payer: COMMERCIAL

## 2018-11-30 VITALS
SYSTOLIC BLOOD PRESSURE: 136 MMHG | BODY MASS INDEX: 29.24 KG/M2 | HEIGHT: 73 IN | OXYGEN SATURATION: 98 % | DIASTOLIC BLOOD PRESSURE: 88 MMHG | HEART RATE: 69 BPM | WEIGHT: 220.6 LBS

## 2018-11-30 DIAGNOSIS — G47.00 INSOMNIA, UNSPECIFIED TYPE: ICD-10-CM

## 2018-11-30 DIAGNOSIS — R00.0 TACHYCARDIA: Primary | ICD-10-CM

## 2018-11-30 PROCEDURE — 99213 OFFICE O/P EST LOW 20 MIN: CPT | Performed by: PHYSICIAN ASSISTANT

## 2018-11-30 RX ORDER — ZOLPIDEM TARTRATE 10 MG/1
TABLET ORAL
Qty: 90 TABLET | Refills: 0 | Status: SHIPPED | OUTPATIENT
Start: 2018-11-30 | End: 2019-02-25 | Stop reason: SDUPTHER

## 2018-11-30 ASSESSMENT — ENCOUNTER SYMPTOMS: RESPIRATORY NEGATIVE: 1

## 2018-12-03 ENCOUNTER — TELEPHONE (OUTPATIENT)
Dept: FAMILY MEDICINE CLINIC | Age: 63
End: 2018-12-03

## 2018-12-03 DIAGNOSIS — R00.0 TACHYCARDIA: Primary | ICD-10-CM

## 2018-12-03 NOTE — TELEPHONE ENCOUNTER
Please let him know I put in the referral to Dr. Liz Hampton [phone].  Please have him call and schedule

## 2018-12-21 ENCOUNTER — OFFICE VISIT (OUTPATIENT)
Dept: CARDIOLOGY CLINIC | Age: 63
End: 2018-12-21
Payer: COMMERCIAL

## 2018-12-21 VITALS
OXYGEN SATURATION: 98 % | SYSTOLIC BLOOD PRESSURE: 142 MMHG | HEART RATE: 61 BPM | BODY MASS INDEX: 29.77 KG/M2 | HEIGHT: 73 IN | WEIGHT: 224.6 LBS | DIASTOLIC BLOOD PRESSURE: 90 MMHG

## 2018-12-21 DIAGNOSIS — R00.2 PALPITATIONS: Primary | ICD-10-CM

## 2018-12-21 DIAGNOSIS — R00.0 HEART RATE FAST: ICD-10-CM

## 2018-12-21 DIAGNOSIS — E78.2 MIXED HYPERLIPIDEMIA: ICD-10-CM

## 2018-12-21 DIAGNOSIS — Z82.49 FAMILY HISTORY OF EARLY CAD: ICD-10-CM

## 2018-12-21 DIAGNOSIS — I10 ESSENTIAL HYPERTENSION, BENIGN: ICD-10-CM

## 2018-12-21 PROCEDURE — 93000 ELECTROCARDIOGRAM COMPLETE: CPT | Performed by: INTERNAL MEDICINE

## 2018-12-21 PROCEDURE — 99203 OFFICE O/P NEW LOW 30 MIN: CPT | Performed by: INTERNAL MEDICINE

## 2018-12-21 RX ORDER — CARVEDILOL 3.12 MG/1
3.12 TABLET ORAL DAILY
Qty: 30 TABLET | Refills: 11 | Status: SHIPPED | OUTPATIENT
Start: 2018-12-21 | End: 2019-01-29 | Stop reason: SDUPTHER

## 2018-12-21 NOTE — PROGRESS NOTES
TABLET BY MOUTH AT BEDTIME AS NEEDED FOR SLEEP. 11/30/18 2/27/19 Yes Kris Russell MD   gabapentin (NEURONTIN) 300 MG capsule Take 1 capsule by mouth daily. . 11/5/18 11/5/19 Yes NURYS Dominguez   lisinopril-hydrochlorothiazide (PRINZIDE;ZESTORETIC) 20-12.5 MG per tablet TAKE 1 TABLET EVERY MORNING 8/23/18  Yes Oscar Marin MD   simvastatin (ZOCOR) 20 MG tablet Take 1 tablet by mouth nightly 8/23/18  Yes Kris Russell MD          Allergies:  Codeine and Penicillins     Review of Systems:   A 14 point review of symptoms completed. Pertinent positives identified in the HPI, all other review of symptoms negative as below.       Objective   PHYSICAL EXAM:    Vitals:    12/21/18 1604   BP: (!) 142/90   Pulse:    SpO2:     Weight: 224 lb 9.6 oz (101.9 kg)         General Appearance:  Alert, cooperative, no distress, appears stated age   Head:  Normocephalic, without obvious abnormality, atraumatic   Eyes:  PERRL, conjunctiva/corneas clear   Nose: Nares normal, no drainage or sinus tenderness   Throat: Lips, mucosa, and tongue normal   Neck: Supple, symmetrical, trachea midline, no adenopathy, thyroid: not enlarged, symmetric, no tenderness/mass/nodules, no carotid bruit or JVD   Lungs:   Clear to auscultation bilaterally, respirations unlabored   Chest Wall:  No deformity or tenderness   Heart:  Regular rate and rhythm, S1, S2 normal, no murmur, rub or gallop   Abdomen:   Soft, non-tender, bowel sounds active all four quadrants,  no masses, no organomegaly   Extremities: Extremities normal, atraumatic, no cyanosis or edema   Pulses: 2+ and symmetric   Skin: Skin color, texture, turgor normal, no rashes or lesions   Pysch: Normal mood and affect   Neurologic: Normal gross motor and sensory exam.         Labs   CBC:   Lab Results   Component Value Date    WBC 7.4 02/23/2018    RBC 4.95 02/23/2018    HGB 14.9 02/23/2018    HCT 42.4 02/23/2018    MCV 85.6 02/23/2018    RDW 13.9 02/23/2018     02/23/2018 were discussed. I, Dr Hong Hector, personally performed the services described in this documentation, as scribed by the above signed scribe in my presence. It is both accurate and complete to my knowledge. I agree with the details independently gathered by the clinical support staff and the scribed note accurately describes my personal service to the patient.

## 2018-12-26 ENCOUNTER — HOSPITAL ENCOUNTER (OUTPATIENT)
Dept: CT IMAGING | Age: 63
Discharge: HOME | End: 2018-12-26
Payer: COMMERCIAL

## 2018-12-26 DIAGNOSIS — R00.2 PALPITATIONS: ICD-10-CM

## 2018-12-26 DIAGNOSIS — Z82.49 FAMILY HISTORY OF EARLY CAD: ICD-10-CM

## 2018-12-26 PROCEDURE — 75571 CT HRT W/O DYE W/CA TEST: CPT

## 2019-01-02 ENCOUNTER — TELEPHONE (OUTPATIENT)
Dept: CARDIOLOGY CLINIC | Age: 64
End: 2019-01-02

## 2019-01-17 ENCOUNTER — HOSPITAL ENCOUNTER (OUTPATIENT)
Dept: CARDIOLOGY | Age: 64
Discharge: HOME | End: 2019-01-17
Payer: COMMERCIAL

## 2019-01-17 VITALS — HEIGHT: 73 IN | BODY MASS INDEX: 29.69 KG/M2 | WEIGHT: 224 LBS

## 2019-01-17 DIAGNOSIS — Z82.49 FAMILY HISTORY OF EARLY CAD: ICD-10-CM

## 2019-01-17 DIAGNOSIS — R00.2 PALPITATIONS: ICD-10-CM

## 2019-01-17 LAB
LV EF: 55 %
LV EF: 56 %
LVEF MODALITY: NORMAL
LVEF MODALITY: NORMAL

## 2019-01-17 PROCEDURE — 93017 CV STRESS TEST TRACING ONLY: CPT

## 2019-01-17 PROCEDURE — 93306 TTE W/DOPPLER COMPLETE: CPT

## 2019-01-17 PROCEDURE — 3430000000 HC RX DIAGNOSTIC RADIOPHARMACEUTICAL: Performed by: FAMILY MEDICINE

## 2019-01-17 PROCEDURE — 78452 HT MUSCLE IMAGE SPECT MULT: CPT

## 2019-01-17 PROCEDURE — A9502 TC99M TETROFOSMIN: HCPCS | Performed by: FAMILY MEDICINE

## 2019-01-17 RX ADMIN — TETROFOSMIN 11.1 MILLICURIE: 0.23 INJECTION, POWDER, LYOPHILIZED, FOR SOLUTION INTRAVENOUS at 12:35

## 2019-01-17 RX ADMIN — TETROFOSMIN 33 MILLICURIE: 0.23 INJECTION, POWDER, LYOPHILIZED, FOR SOLUTION INTRAVENOUS at 14:03

## 2019-01-18 ENCOUNTER — TELEPHONE (OUTPATIENT)
Dept: CARDIOLOGY CLINIC | Age: 64
End: 2019-01-18

## 2019-01-29 DIAGNOSIS — I10 ESSENTIAL HYPERTENSION, BENIGN: ICD-10-CM

## 2019-01-30 RX ORDER — CARVEDILOL 3.12 MG/1
3.12 TABLET ORAL 2 TIMES DAILY
Qty: 60 TABLET | Refills: 11 | Status: SHIPPED | OUTPATIENT
Start: 2019-01-30 | End: 2019-02-22 | Stop reason: SDUPTHER

## 2019-02-19 DIAGNOSIS — I10 ESSENTIAL HYPERTENSION, BENIGN: ICD-10-CM

## 2019-02-19 RX ORDER — LISINOPRIL AND HYDROCHLOROTHIAZIDE 20; 12.5 MG/1; MG/1
TABLET ORAL
Qty: 90 TABLET | Refills: 1 | Status: SHIPPED | OUTPATIENT
Start: 2019-02-19 | End: 2019-05-06

## 2019-02-22 ENCOUNTER — OFFICE VISIT (OUTPATIENT)
Dept: CARDIOLOGY CLINIC | Age: 64
End: 2019-02-22
Payer: COMMERCIAL

## 2019-02-22 VITALS
DIASTOLIC BLOOD PRESSURE: 82 MMHG | WEIGHT: 222.4 LBS | BODY MASS INDEX: 29.48 KG/M2 | HEIGHT: 73 IN | HEART RATE: 55 BPM | SYSTOLIC BLOOD PRESSURE: 138 MMHG | OXYGEN SATURATION: 99 %

## 2019-02-22 DIAGNOSIS — Z82.49 FAMILY HISTORY OF EARLY CAD: Primary | ICD-10-CM

## 2019-02-22 DIAGNOSIS — I10 ESSENTIAL HYPERTENSION, BENIGN: ICD-10-CM

## 2019-02-22 DIAGNOSIS — E78.2 MIXED HYPERLIPIDEMIA: ICD-10-CM

## 2019-02-22 PROCEDURE — 99213 OFFICE O/P EST LOW 20 MIN: CPT | Performed by: INTERNAL MEDICINE

## 2019-02-22 RX ORDER — CARVEDILOL 6.25 MG/1
6.25 TABLET ORAL 2 TIMES DAILY
Qty: 60 TABLET | Refills: 11 | Status: SHIPPED | OUTPATIENT
Start: 2019-02-22 | End: 2019-04-01 | Stop reason: SDUPTHER

## 2019-02-25 ENCOUNTER — OFFICE VISIT (OUTPATIENT)
Dept: FAMILY MEDICINE CLINIC | Age: 64
End: 2019-02-25
Payer: COMMERCIAL

## 2019-02-25 VITALS
WEIGHT: 222 LBS | DIASTOLIC BLOOD PRESSURE: 74 MMHG | HEART RATE: 56 BPM | HEIGHT: 73 IN | SYSTOLIC BLOOD PRESSURE: 128 MMHG | OXYGEN SATURATION: 97 % | BODY MASS INDEX: 29.42 KG/M2

## 2019-02-25 DIAGNOSIS — R22.9 SKIN NODULE: ICD-10-CM

## 2019-02-25 DIAGNOSIS — Z00.00 ANNUAL PHYSICAL EXAM: Primary | ICD-10-CM

## 2019-02-25 DIAGNOSIS — G47.00 INSOMNIA, UNSPECIFIED TYPE: ICD-10-CM

## 2019-02-25 DIAGNOSIS — E78.2 MIXED HYPERLIPIDEMIA: ICD-10-CM

## 2019-02-25 LAB
A/G RATIO: 2 (ref 1.1–2.2)
ALBUMIN SERPL-MCNC: 4.5 G/DL (ref 3.4–5)
ALP BLD-CCNC: 58 U/L (ref 40–129)
ALT SERPL-CCNC: 25 U/L (ref 10–40)
ANION GAP SERPL CALCULATED.3IONS-SCNC: 14 MMOL/L (ref 3–16)
AST SERPL-CCNC: 20 U/L (ref 15–37)
BILIRUB SERPL-MCNC: 0.4 MG/DL (ref 0–1)
BILIRUBIN, POC: NEGATIVE
BLOOD URINE, POC: NEGATIVE
BUN BLDV-MCNC: 19 MG/DL (ref 7–20)
CALCIUM SERPL-MCNC: 9.5 MG/DL (ref 8.3–10.6)
CHLORIDE BLD-SCNC: 104 MMOL/L (ref 99–110)
CHOLESTEROL, TOTAL: 154 MG/DL (ref 0–199)
CLARITY, POC: NORMAL
CO2: 28 MMOL/L (ref 21–32)
COLOR, POC: NORMAL
CREAT SERPL-MCNC: 0.8 MG/DL (ref 0.8–1.3)
GFR AFRICAN AMERICAN: >60
GFR NON-AFRICAN AMERICAN: >60
GLOBULIN: 2.3 G/DL
GLUCOSE BLD-MCNC: 106 MG/DL (ref 70–99)
GLUCOSE URINE, POC: NEGATIVE
HCT VFR BLD CALC: 43.5 % (ref 40.5–52.5)
HDLC SERPL-MCNC: 37 MG/DL (ref 40–60)
HEMOGLOBIN: 14.7 G/DL (ref 13.5–17.5)
KETONES, POC: NEGATIVE
LDL CHOLESTEROL CALCULATED: 93 MG/DL
LEUKOCYTE EST, POC: NEGATIVE
MCH RBC QN AUTO: 30 PG (ref 26–34)
MCHC RBC AUTO-ENTMCNC: 33.9 G/DL (ref 31–36)
MCV RBC AUTO: 88.5 FL (ref 80–100)
NITRITE, POC: NEGATIVE
PDW BLD-RTO: 13.5 % (ref 12.4–15.4)
PH, POC: 5.5
PLATELET # BLD: 221 K/UL (ref 135–450)
PMV BLD AUTO: 8.3 FL (ref 5–10.5)
POTASSIUM SERPL-SCNC: 4.9 MMOL/L (ref 3.5–5.1)
PROSTATE SPECIFIC ANTIGEN: 0.63 NG/ML (ref 0–4)
PROTEIN, POC: NEGATIVE
RBC # BLD: 4.91 M/UL (ref 4.2–5.9)
SODIUM BLD-SCNC: 146 MMOL/L (ref 136–145)
SPECIFIC GRAVITY, POC: 1.02
TOTAL PROTEIN: 6.8 G/DL (ref 6.4–8.2)
TRIGL SERPL-MCNC: 118 MG/DL (ref 0–150)
UROBILINOGEN, POC: 0.2
VLDLC SERPL CALC-MCNC: 24 MG/DL
WBC # BLD: 5.7 K/UL (ref 4–11)

## 2019-02-25 PROCEDURE — 99396 PREV VISIT EST AGE 40-64: CPT | Performed by: PHYSICIAN ASSISTANT

## 2019-02-25 PROCEDURE — 90750 HZV VACC RECOMBINANT IM: CPT | Performed by: PHYSICIAN ASSISTANT

## 2019-02-25 PROCEDURE — 90471 IMMUNIZATION ADMIN: CPT | Performed by: PHYSICIAN ASSISTANT

## 2019-02-25 PROCEDURE — 81002 URINALYSIS NONAUTO W/O SCOPE: CPT | Performed by: PHYSICIAN ASSISTANT

## 2019-02-25 RX ORDER — SIMVASTATIN 20 MG
20 TABLET ORAL NIGHTLY
Qty: 90 TABLET | Refills: 1 | Status: SHIPPED | OUTPATIENT
Start: 2019-02-25 | End: 2019-08-09 | Stop reason: SDUPTHER

## 2019-02-25 RX ORDER — ZOLPIDEM TARTRATE 10 MG/1
TABLET ORAL
Qty: 90 TABLET | Refills: 0 | Status: SHIPPED | OUTPATIENT
Start: 2019-02-25 | End: 2019-05-25

## 2019-02-25 ASSESSMENT — PATIENT HEALTH QUESTIONNAIRE - PHQ9
SUM OF ALL RESPONSES TO PHQ9 QUESTIONS 1 & 2: 0
SUM OF ALL RESPONSES TO PHQ QUESTIONS 1-9: 0
1. LITTLE INTEREST OR PLEASURE IN DOING THINGS: 0
2. FEELING DOWN, DEPRESSED OR HOPELESS: 0
SUM OF ALL RESPONSES TO PHQ QUESTIONS 1-9: 0

## 2019-02-28 ENCOUNTER — HOSPITAL ENCOUNTER (OUTPATIENT)
Dept: GENERAL RADIOLOGY | Age: 64
Discharge: HOME | End: 2019-02-28
Payer: COMMERCIAL

## 2019-02-28 DIAGNOSIS — R22.9 SKIN NODULE: ICD-10-CM

## 2019-02-28 PROCEDURE — 73620 X-RAY EXAM OF FOOT: CPT

## 2019-03-11 ENCOUNTER — TELEPHONE (OUTPATIENT)
Dept: FAMILY MEDICINE CLINIC | Age: 64
End: 2019-03-11

## 2019-04-01 DIAGNOSIS — I10 ESSENTIAL HYPERTENSION, BENIGN: ICD-10-CM

## 2019-04-02 NOTE — TELEPHONE ENCOUNTER
LEVON Pt  Last office visit 2/22/2019  Plan   1. Increase carvedilol to 6.25 mg twice daily  2. Follow up with PCP for CT calcium testing for non cardiac results  3. Lipid panel with PCP  4.  Follow up with NP in 1 month

## 2019-04-03 RX ORDER — CARVEDILOL 6.25 MG/1
TABLET ORAL
Qty: 180 TABLET | Refills: 11 | Status: SHIPPED | OUTPATIENT
Start: 2019-04-03 | End: 2020-05-05

## 2019-05-06 ENCOUNTER — OFFICE VISIT (OUTPATIENT)
Dept: CARDIOLOGY CLINIC | Age: 64
End: 2019-05-06
Payer: COMMERCIAL

## 2019-05-06 VITALS
WEIGHT: 228.8 LBS | OXYGEN SATURATION: 97 % | HEART RATE: 56 BPM | SYSTOLIC BLOOD PRESSURE: 152 MMHG | HEIGHT: 73 IN | BODY MASS INDEX: 30.32 KG/M2 | DIASTOLIC BLOOD PRESSURE: 90 MMHG

## 2019-05-06 DIAGNOSIS — I10 ESSENTIAL HYPERTENSION, BENIGN: Primary | ICD-10-CM

## 2019-05-06 DIAGNOSIS — E78.2 MIXED HYPERLIPIDEMIA: ICD-10-CM

## 2019-05-06 PROCEDURE — 99213 OFFICE O/P EST LOW 20 MIN: CPT | Performed by: NURSE PRACTITIONER

## 2019-05-06 RX ORDER — LISINOPRIL AND HYDROCHLOROTHIAZIDE 20; 12.5 MG/1; MG/1
2 TABLET ORAL DAILY
Qty: 180 TABLET | Refills: 3 | Status: SHIPPED | OUTPATIENT
Start: 2019-05-06 | End: 2019-08-19

## 2019-05-06 NOTE — PROGRESS NOTES
vomiting, diarrhea, constipation, abdominal pain or changes in bowel habits. : No urinary frequency, urgency, incontinence hematuria or dysuria. SKIN: No cyanosis or skin lesions. MUSCULOSKELETAL: No new muscle or joint pain. NEUROLOGICAL: No syncope or TIA-like symptoms. PSYCHIATRIC: No anxiety, pain, insomnia or depression    Objective:   PHYSICAL EXAM:       Vitals:    05/06/19 1511 05/06/19 1516 05/06/19 1533 05/06/19 1534   BP: (!) 150/90 (!) 142/90 (!) 152/90 (!) 152/90   Site:   Left Upper Arm Right Upper Arm   Cuff Size:   Medium Adult Medium Adult   Pulse: 56      SpO2: 97%      Weight: 228 lb 12.8 oz (103.8 kg)      Height: 6' 1\" (1.854 m)           VITALS:  BP (!) 142/90   Pulse 56   Ht 6' 1\" (1.854 m)   Wt 228 lb 12.8 oz (103.8 kg)   SpO2 97%   BMI 30.19 kg/m²   CONSTITUTIONAL: Cooperative, no apparent distress, and appears well nourished / developed  NEUROLOGIC:  Awake and orientated to person, place and time. PSYCH: Calm affect. SKIN: Warm and dry. HEENT: Sclera non-icteric, normocephalic, neck supple, no elevation of JVP, normal carotid pulses with no bruits and thyroid normal size. LUNGS:  No increased work of breathing and clear to auscultation, no crackles or wheezing  CARDIOVASCULAR:  Regular rate 60 and rhythm with no murmurs, gallops, rubs, or abnormal heart sounds, normal PMI. The apical impulses not displaced  JVP less than 8 cm H2O  Heart tones are crisp and normal  Cervical veins are not engorged  The carotid upstroke is normal in amplitude and contour without delay or bruit  JVP is not elevated  ABDOMEN:  Normal bowel sounds, non-distended and non-tender to palpation  EXT: No edema, no calf tenderness. Pulses are present bilaterally.     DATA:    Lab Results   Component Value Date    ALT 25 02/25/2019    AST 20 02/25/2019    ALKPHOS 58 02/25/2019    BILITOT 0.4 02/25/2019     Lab Results   Component Value Date    CREATININE 0.8 02/25/2019    BUN 19 02/25/2019     (H) 02/25/2019    K 4.9 02/25/2019     02/25/2019    CO2 28 02/25/2019     No results found for: TSH, V7RNCAT, L1CJKPF, THYROIDAB  Lab Results   Component Value Date    WBC 5.7 02/25/2019    HGB 14.7 02/25/2019    HCT 43.5 02/25/2019    MCV 88.5 02/25/2019     02/25/2019     No components found for: CHLPL  Lab Results   Component Value Date    TRIG 118 02/25/2019    TRIG 113 08/23/2018    TRIG 118 02/23/2018     Lab Results   Component Value Date    HDL 37 (L) 02/25/2019    HDL 36 (L) 08/23/2018    HDL 41 02/23/2018     Lab Results   Component Value Date    LDLCALC 93 02/25/2019    LDLCALC 85 08/23/2018    LDLCALC 102 (H) 02/23/2018     Lab Results   Component Value Date    LABVLDL 24 02/25/2019    LABVLDL 23 08/23/2018    LABVLDL 24 02/23/2018     Radiology Review:  Pertinent images / reports were reviewed as a part of this visit and reveals the following:    CT calcium score: 12/26/18  Total Agatston calcium score of 69  Incidentally, showed mild thickening of airways and probable arthritis in spine     Echo: 01/17/19   Normal left ventricle systolic function with an estimated ejection fraction   of 55%.   No regional wall motion abnormalities are seen.   Normal left ventricular diastolic filling pressure.   Mild bi-atrial enlargement.   Mild mitral regurgitation.   Systolic pulmonary artery pressure (SPAP) is normal and estimated at 24 mmHg   (right atrial pressure 3 mmHg).    Stress Test: 01/17/19   Summary  Excellent functional capacity  Low Normal LV function.  No ischemia      Assessment:      Diagnosis Orders   1. Essential hypertension, benign   ~suboptimal in office today  ~stated no significant improvement by home readings  ~has taken lisinopril-HCT for several years  ~normal LVEF by echo and stress test lisinopril-hydrochlorothiazide (PRINZIDE;ZESTORETIC) 20-12.5 MG per tablet   2.  Mixed hyperlipidemia   ~HDL near goal ; LDL controlled  ~simvastatin 20 mg daily           I had the opportunity to review the clinical symptoms and presentation of Michael Woo. Plan:     1. Increase lisinopril-HCTZ to 2 tablets daily   ~he will check his BP later today and call the office with his readings   ~he will call after 2 weeks with his readings after increasing lisinopril-HCTZ  2. F/U in four weeks; may need to consider adding amlodipine    Overall the patient is stable from CV standpoint    I have addresed the patient's cardiac risk factors and adjusted pharmacologic treatment as needed. In addition, I have reinforced the need for patient directed risk factor modification. Further evaluation will be based upon the patient's clinical course and testing results. All questions and concerns were addressed to the patient. Alternatives to my treatment were discussed. The patient is not currently smoking. The risks related to smoking were reviewed with the patient. Recommend maintaining a smoke-free lifestyle. Patient is on a beta-blocker  Patient is on an ace-i  Patient is on a statin    Antiplatelet therapy has been recommended / prescribed for this patient. Education conducted on adverse reactions including bleeding was discussed. The patient verbalizes understanding not to stop medications without discussing with us. Discussed exercise: 30-60 minutes 7 days/week: 3 miles / day in combination of treadmill, elliptical and bike  Discussed Low saturated fat diet. Thank you for allowing to us to participate in the care of Michael Woo.     JACE Kovacs    Documentation of today's visit sent to PCP

## 2019-05-07 ENCOUNTER — TELEPHONE (OUTPATIENT)
Dept: CARDIOLOGY CLINIC | Age: 64
End: 2019-05-07

## 2019-05-07 NOTE — TELEPHONE ENCOUNTER
Pt called on answering service Pt was in to see NPT yesterday and was told to call when he got home and give his BP reading it was 144/89.  Pls call to advise Thank you

## 2019-05-24 ENCOUNTER — TELEPHONE (OUTPATIENT)
Dept: CARDIOLOGY CLINIC | Age: 64
End: 2019-05-24

## 2019-05-24 NOTE — TELEPHONE ENCOUNTER
Pt called on answering service to report his BP per NPT. The first week was 130-135/? After increase in meds 2nd week 120-125/75-80 had a few 117\"s/75's. Seems to think increase in medication is helping. Has fu on 06/10/19.  Pls call to advise Thank you

## 2019-05-24 NOTE — TELEPHONE ENCOUNTER
Spoke to pt and explained that NPTS agrees and to continue to take meds and follow up at the June appt

## 2019-06-10 ENCOUNTER — OFFICE VISIT (OUTPATIENT)
Dept: CARDIOLOGY CLINIC | Age: 64
End: 2019-06-10
Payer: COMMERCIAL

## 2019-06-10 VITALS
SYSTOLIC BLOOD PRESSURE: 122 MMHG | BODY MASS INDEX: 29.95 KG/M2 | OXYGEN SATURATION: 97 % | DIASTOLIC BLOOD PRESSURE: 72 MMHG | HEIGHT: 73 IN | HEART RATE: 56 BPM | WEIGHT: 226 LBS

## 2019-06-10 DIAGNOSIS — E78.2 MIXED HYPERLIPIDEMIA: ICD-10-CM

## 2019-06-10 DIAGNOSIS — I10 ESSENTIAL HYPERTENSION, BENIGN: Primary | ICD-10-CM

## 2019-06-10 PROCEDURE — 99213 OFFICE O/P EST LOW 20 MIN: CPT | Performed by: NURSE PRACTITIONER

## 2019-06-10 NOTE — PROGRESS NOTES
[address]     Outpatient Follow Up Note    Morales Cortez is 60 y.o. male who presents today with a history of HTN,  Hyperlipidemia and calcium score of 69      CHIEF COMPLAINT / HPI:  Follow Up secondary to HTN. His BP is now < 130/85 and the past couple of days its < 120/75     Subjective:   he denies significant chest pain. There is no SOB/LUGO. The patient denies orthopnea/PND. The patient does not have swelling. The patients weight is stable . The patient is not experiencing palpitations or dizziness. These symptoms show no change since the last OV. With regard to medication therapy the patient has been compliant with prescribed regimen. They have tolerated therapy to date. Past Medical History:   Diagnosis Date    Elevated fasting glucose     Hyperlipidemia     Hypertension     Insomnia      Social History:    Social History     Tobacco Use   Smoking Status Never Smoker   Smokeless Tobacco Never Used     Current Medications:  Current Outpatient Medications   Medication Sig Dispense Refill    zolpidem (AMBIEN) 10 MG tablet TAKE 1 TABLET BY MOUTH EVERY DAY AT BEDTIME 90 tablet 0    lisinopril-hydrochlorothiazide (PRINZIDE;ZESTORETIC) 20-12.5 MG per tablet Take 2 tablets by mouth daily 180 tablet 3    carvedilol (COREG) 6.25 MG tablet TAKE 1 TABLET BY MOUTH TWICE DAILY 180 tablet 11    simvastatin (ZOCOR) 20 MG tablet Take 1 tablet by mouth nightly 90 tablet 1    aspirin 81 MG tablet Take 81 mg by mouth daily      gabapentin (NEURONTIN) 300 MG capsule Take 1 capsule by mouth daily. . 30 capsule 0     No current facility-administered medications for this visit. REVIEW OF SYSTEMS:    CONSTITUTIONAL: No major weight gain or loss, fatigue, weakness, night sweats or fever. HEENT: No new vision difficulties or ringing in the ears. RESPIRATORY: No new SOB, PND, orthopnea or cough.    CARDIOVASCULAR: See HPI  GI: No nausea, vomiting, diarrhea, constipation, abdominal pain or changes in Component Value Date    HDL 37 (L) 02/25/2019    HDL 36 (L) 08/23/2018    HDL 41 02/23/2018     Lab Results   Component Value Date    LDLCALC 93 02/25/2019    LDLCALC 85 08/23/2018    LDLCALC 102 (H) 02/23/2018     Lab Results   Component Value Date    LABVLDL 24 02/25/2019    LABVLDL 23 08/23/2018    LABVLDL 24 02/23/2018     Radiology Review:  Pertinent images / reports were reviewed as a part of this visit and reveals the following:    CT calcium score: 12/26/18  Total Agatston calcium score of 69  Incidentally, showed mild thickening of airways and probable arthritis in spine     Echo: 01/17/19   Normal left ventricle systolic function with an estimated ejection fraction   of 55%.   No regional wall motion abnormalities are seen.   Normal left ventricular diastolic filling pressure.   Mild bi-atrial enlargement.   Mild mitral regurgitation.   Systolic pulmonary artery pressure (SPAP) is normal and estimated at 24 mmHg   (right atrial pressure 3 mmHg).    Stress Test: 01/17/19   Summary  Excellent functional capacity  Low Normal LV function.  No ischemia      Assessment:      Diagnosis Orders   1. Essential hypertension, benign   ~improved with increased dose of lisinopril HCTZ  ~controlled  ~normal LVEF by echo and stress test    2. Mixed hyperlipidemia   ~stable  ~HDL near goal ; LDL controlled  ~simvastatin 20 mg daily       I had the opportunity to review the clinical symptoms and presentation of Eduar Howard. Plan:     1. Continue present management   2. F/U in 1 year    Overall the patient is stable from CV standpoint    I have addresed the patient's cardiac risk factors and adjusted pharmacologic treatment as needed. In addition, I have reinforced the need for patient directed risk factor modification. Further evaluation will be based upon the patient's clinical course and testing results. All questions and concerns were addressed to the patient. Alternatives to my treatment were discussed. The patient is not currently smoking. The risks related to smoking were reviewed with the patient. Recommend maintaining a smoke-free lifestyle. Patient is on a beta-blocker  Patient is on an ace-i  Patient is on a statin    Antiplatelet therapy has been recommended / prescribed for this patient. Education conducted on adverse reactions including bleeding was discussed. The patient verbalizes understanding not to stop medications without discussing with us. Discussed exercise: 30-60 minutes 7 days/week: 3 miles / day in a combination of treadmill, elliptical and bike  Discussed Low saturated fat diet. Thank you for allowing to us to participate in the care of Karina .     JACE Paige    Documentation of today's visit sent to PCP

## 2019-08-09 DIAGNOSIS — E78.2 MIXED HYPERLIPIDEMIA: ICD-10-CM

## 2019-08-09 RX ORDER — SIMVASTATIN 20 MG
TABLET ORAL
Qty: 90 TABLET | Refills: 1 | Status: SHIPPED | OUTPATIENT
Start: 2019-08-09 | End: 2020-02-06

## 2019-08-18 DIAGNOSIS — I10 ESSENTIAL HYPERTENSION, BENIGN: ICD-10-CM

## 2019-08-19 RX ORDER — LISINOPRIL AND HYDROCHLOROTHIAZIDE 20; 12.5 MG/1; MG/1
TABLET ORAL
Qty: 90 TABLET | Refills: 1 | Status: SHIPPED | OUTPATIENT
Start: 2019-08-19 | End: 2020-02-12

## 2019-08-22 ENCOUNTER — OFFICE VISIT (OUTPATIENT)
Dept: FAMILY MEDICINE CLINIC | Age: 64
End: 2019-08-22
Payer: COMMERCIAL

## 2019-08-22 VITALS
SYSTOLIC BLOOD PRESSURE: 120 MMHG | DIASTOLIC BLOOD PRESSURE: 78 MMHG | WEIGHT: 220 LBS | OXYGEN SATURATION: 98 % | BODY MASS INDEX: 29.16 KG/M2 | HEIGHT: 73 IN | HEART RATE: 58 BPM

## 2019-08-22 DIAGNOSIS — I10 ESSENTIAL HYPERTENSION, BENIGN: Primary | ICD-10-CM

## 2019-08-22 DIAGNOSIS — J02.9 SORE THROAT: ICD-10-CM

## 2019-08-22 DIAGNOSIS — M25.512 CHRONIC LEFT SHOULDER PAIN: ICD-10-CM

## 2019-08-22 DIAGNOSIS — M25.552 LATERAL PAIN OF LEFT HIP: ICD-10-CM

## 2019-08-22 DIAGNOSIS — R73.01 ELEVATED FASTING GLUCOSE: ICD-10-CM

## 2019-08-22 DIAGNOSIS — G47.00 INSOMNIA, UNSPECIFIED TYPE: ICD-10-CM

## 2019-08-22 DIAGNOSIS — E78.2 MIXED HYPERLIPIDEMIA: ICD-10-CM

## 2019-08-22 DIAGNOSIS — G89.29 CHRONIC LEFT SHOULDER PAIN: ICD-10-CM

## 2019-08-22 LAB
ANION GAP SERPL CALCULATED.3IONS-SCNC: 14 MMOL/L (ref 3–16)
BUN BLDV-MCNC: 18 MG/DL (ref 7–20)
CALCIUM SERPL-MCNC: 9.5 MG/DL (ref 8.3–10.6)
CHLORIDE BLD-SCNC: 100 MMOL/L (ref 99–110)
CHOLESTEROL, TOTAL: 148 MG/DL (ref 0–199)
CO2: 28 MMOL/L (ref 21–32)
CREAT SERPL-MCNC: 0.8 MG/DL (ref 0.8–1.3)
GFR AFRICAN AMERICAN: >60
GFR NON-AFRICAN AMERICAN: >60
GLUCOSE BLD-MCNC: 114 MG/DL (ref 70–99)
HDLC SERPL-MCNC: 33 MG/DL (ref 40–60)
LDL CHOLESTEROL CALCULATED: 89 MG/DL
POTASSIUM SERPL-SCNC: 4.6 MMOL/L (ref 3.5–5.1)
SODIUM BLD-SCNC: 142 MMOL/L (ref 136–145)
TRIGL SERPL-MCNC: 130 MG/DL (ref 0–150)
VLDLC SERPL CALC-MCNC: 26 MG/DL

## 2019-08-22 PROCEDURE — 99214 OFFICE O/P EST MOD 30 MIN: CPT | Performed by: FAMILY MEDICINE

## 2019-08-22 RX ORDER — ZOLPIDEM TARTRATE 10 MG/1
TABLET ORAL
Qty: 30 TABLET | Refills: 5 | Status: SHIPPED | OUTPATIENT
Start: 2019-08-22 | End: 2020-01-15 | Stop reason: SDUPTHER

## 2019-08-23 LAB
ESTIMATED AVERAGE GLUCOSE: 102.5 MG/DL
HBA1C MFR BLD: 5.2 %

## 2019-08-23 ASSESSMENT — ENCOUNTER SYMPTOMS
CONSTIPATION: 0
SHORTNESS OF BREATH: 0
EYE PAIN: 0
COUGH: 0
ABDOMINAL PAIN: 0

## 2020-01-09 NOTE — TELEPHONE ENCOUNTER
Patient is calling to see if [facility] will accept him as his new PCP as Dr. Edil Marie is leaving. Please advise and call patient is due to come in late Feb 2020 for 6 month f/u.

## 2020-01-15 RX ORDER — ZOLPIDEM TARTRATE 10 MG/1
TABLET ORAL
Qty: 30 TABLET | Refills: 1 | Status: SHIPPED | OUTPATIENT
Start: 2020-01-15 | End: 2020-02-12

## 2020-01-15 NOTE — TELEPHONE ENCOUNTER
Done.  (Rx sent via two step verification through Epic.)        (Uriele Yareliches was run and reviewed personally by me, Gardenia Marin, on 01/15/20, and no signs or suspicion of diversion or other abuse.)

## 2020-02-06 RX ORDER — SIMVASTATIN 20 MG
TABLET ORAL
Qty: 90 TABLET | Refills: 1 | Status: SHIPPED | OUTPATIENT
Start: 2020-02-06 | End: 2021-02-18

## 2020-02-12 ENCOUNTER — OFFICE VISIT (OUTPATIENT)
Dept: FAMILY MEDICINE CLINIC | Age: 65
End: 2020-02-12
Payer: COMMERCIAL

## 2020-02-12 VITALS
RESPIRATION RATE: 16 BRPM | SYSTOLIC BLOOD PRESSURE: 122 MMHG | WEIGHT: 212.8 LBS | BODY MASS INDEX: 28.2 KG/M2 | HEIGHT: 73 IN | OXYGEN SATURATION: 98 % | DIASTOLIC BLOOD PRESSURE: 80 MMHG | TEMPERATURE: 98.1 F | HEART RATE: 60 BPM

## 2020-02-12 PROCEDURE — 99204 OFFICE O/P NEW MOD 45 MIN: CPT | Performed by: FAMILY MEDICINE

## 2020-02-12 RX ORDER — LOSARTAN POTASSIUM 50 MG/1
50 TABLET ORAL DAILY
Qty: 30 TABLET | Refills: 3 | Status: SHIPPED | OUTPATIENT
Start: 2020-02-12 | End: 2020-02-12

## 2020-02-12 RX ORDER — GABAPENTIN 300 MG/1
300 CAPSULE ORAL DAILY
Qty: 30 CAPSULE | Refills: 5 | Status: SHIPPED | OUTPATIENT
Start: 2020-02-12 | End: 2020-02-20

## 2020-02-12 RX ORDER — ZOLPIDEM TARTRATE 5 MG/1
TABLET ORAL
Qty: 30 TABLET | Refills: 1 | Status: SHIPPED | OUTPATIENT
Start: 2020-02-12 | End: 2020-05-01

## 2020-02-12 RX ORDER — HYDROCHLOROTHIAZIDE 25 MG/1
25 TABLET ORAL EVERY MORNING
Qty: 30 TABLET | Refills: 3 | Status: SHIPPED | OUTPATIENT
Start: 2020-02-12 | End: 2020-02-12

## 2020-02-12 ASSESSMENT — PATIENT HEALTH QUESTIONNAIRE - PHQ9
SUM OF ALL RESPONSES TO PHQ QUESTIONS 1-9: 1
SUM OF ALL RESPONSES TO PHQ9 QUESTIONS 1 & 2: 1
2. FEELING DOWN, DEPRESSED OR HOPELESS: 1
SUM OF ALL RESPONSES TO PHQ QUESTIONS 1-9: 1
1. LITTLE INTEREST OR PLEASURE IN DOING THINGS: 0

## 2020-02-12 ASSESSMENT — ENCOUNTER SYMPTOMS
COUGH: 1
BLOOD IN STOOL: 0

## 2020-02-17 RX ORDER — LISINOPRIL AND HYDROCHLOROTHIAZIDE 20; 12.5 MG/1; MG/1
TABLET ORAL
Qty: 90 TABLET | Refills: 1 | Status: SHIPPED | OUTPATIENT
Start: 2020-02-17 | End: 2020-02-20

## 2020-02-17 NOTE — TELEPHONE ENCOUNTER
Liberty Galvan is requesting refill(s) lisinopril/hctz  Last OV 8/22/19 (pertaining to medication)  LR 8/19/19 (per medication requested)  Next office visit scheduled or attempted Yes   If no, reason:  2/20/20, NTP appt with Dr. Shay Adjutant

## 2020-02-20 ENCOUNTER — OFFICE VISIT (OUTPATIENT)
Dept: FAMILY MEDICINE CLINIC | Age: 65
End: 2020-02-20
Payer: COMMERCIAL

## 2020-02-20 VITALS
SYSTOLIC BLOOD PRESSURE: 102 MMHG | HEART RATE: 66 BPM | BODY MASS INDEX: 27.97 KG/M2 | RESPIRATION RATE: 16 BRPM | WEIGHT: 212 LBS | DIASTOLIC BLOOD PRESSURE: 72 MMHG | OXYGEN SATURATION: 99 %

## 2020-02-20 PROCEDURE — 99214 OFFICE O/P EST MOD 30 MIN: CPT | Performed by: FAMILY MEDICINE

## 2020-02-20 RX ORDER — TRAZODONE HYDROCHLORIDE 50 MG/1
50 TABLET ORAL NIGHTLY
Qty: 30 TABLET | Refills: 1 | Status: SHIPPED | OUTPATIENT
Start: 2020-02-20 | End: 2020-05-18

## 2020-02-20 ASSESSMENT — ENCOUNTER SYMPTOMS: BLOOD IN STOOL: 0

## 2020-02-20 NOTE — PROGRESS NOTES
ASSESSMENT/PLAN:  Trinity Mullins was seen today for medication check. Diagnoses and all orders for this visit:    Primary insomnia  -     traZODone (DESYREL) 50 MG tablet; Take 1 tablet by mouth nightly    Hypertension, unspecified type  Controlled, continue taking BP medications as prescribed    Hyperlipidemia, unspecified hyperlipidemia type  Cont zocor    Return in about 2 weeks (around 3/5/2020). An electronic signature was used to authenticate this note.     --Adan Matson MD on 2/20/2020 at 9:14 AM

## 2020-02-28 RX ORDER — ZOLPIDEM TARTRATE 10 MG/1
TABLET ORAL
Qty: 30 TABLET | OUTPATIENT
Start: 2020-02-28

## 2020-02-28 NOTE — TELEPHONE ENCOUNTER
Pt is supposed to be on trial of trazodone, if this isn't working he can discuss at next appt or I can refer him to sleep medicine

## 2020-02-28 NOTE — TELEPHONE ENCOUNTER
This medication was sent to the pharmacy on 2/12/20 with 1 refill. Pt still has 1 refill remaining on this.

## 2020-05-01 ENCOUNTER — TELEPHONE (OUTPATIENT)
Dept: FAMILY MEDICINE CLINIC | Age: 65
End: 2020-05-01

## 2020-05-01 DIAGNOSIS — G47.00 INSOMNIA, UNSPECIFIED TYPE: ICD-10-CM

## 2020-05-01 RX ORDER — ZOLPIDEM TARTRATE 5 MG/1
TABLET ORAL
Qty: 30 TABLET | Refills: 2 | Status: SHIPPED | OUTPATIENT
Start: 2020-05-01 | End: 2020-05-18 | Stop reason: ALTCHOICE

## 2020-05-05 RX ORDER — CARVEDILOL 6.25 MG/1
TABLET ORAL
Qty: 180 TABLET | Refills: 1 | Status: SHIPPED | OUTPATIENT
Start: 2020-05-05 | End: 2020-12-28

## 2020-05-05 NOTE — TELEPHONE ENCOUNTER
6/10/2019 NPTS    No upcoming appt    Refused medication 4/19/2020    NPTS says refills should come from [facility]

## 2020-05-13 ENCOUNTER — TELEPHONE (OUTPATIENT)
Dept: PULMONOLOGY | Age: 65
End: 2020-05-13

## 2020-05-13 NOTE — TELEPHONE ENCOUNTER
the provision of medical care and treatment via Telehealth and the Service and you may not be able to receive diagnosis and/or treatment through the Service for every condition for which you seek diagnosis and/or treatment. 11. There are potential risks to the use of Telehealth, including but not limited to the risks described in this Telehealth Consent. 12. Your Provider(s) have discussed the use of Telehealth and the Service with you, including the benefits and risks of such and you have provided oral consent to your Provider(s) for the use of Telehealth and the Service. 15. You understand that it is your duty to provide your Provider(s) truthful, accurate and complete information, including all relevant information regarding care that you may have received or may be receiving from other healthcare providers outside of the Service. 14. You understand that each of your Provider(s) may determine in his or sole discretion that your condition is not suitable for diagnosis and/or treatment using the Service, and that you may need to seek medical care and treatment a specialist or other healthcare provider, outside of the Service. 15. You understand that you are fully responsible for payment for all services provided by Provider(s) or through use of the Service and that you may not be able to use third-party insurance. 16. You represent that (a) you have read this Telehealth Consent carefully, (b) you understand the risks and benefits of the Service and the use of Telehealth in the medical care and treatment provided to you by Provider(s) using the Service, and (c) you have the legal capacity and authority to provide this consent for yourself and/or the minor for which you are consenting under applicable federal and state laws, including laws relating to the age of [de-identified] and/or parental/guardian consent.    17. You give your informed consent to the use of Telehealth by Provider(s) using the Service under the terms described in the Terms of Service and this Telehealth Consent. The patient was read the following statement and has consented to the visit as of 5/13/20. The patient has been scheduled for their first telehealth visit on 5/18/2020 with Dr Kevin Hernandez.

## 2020-05-18 ENCOUNTER — VIRTUAL VISIT (OUTPATIENT)
Dept: PULMONOLOGY | Age: 65
End: 2020-05-18
Payer: COMMERCIAL

## 2020-05-18 VITALS — WEIGHT: 220 LBS | BODY MASS INDEX: 29.16 KG/M2 | HEIGHT: 73 IN

## 2020-05-18 PROCEDURE — 99204 OFFICE O/P NEW MOD 45 MIN: CPT | Performed by: INTERNAL MEDICINE

## 2020-05-18 RX ORDER — TRAZODONE HYDROCHLORIDE 50 MG/1
50 TABLET ORAL NIGHTLY
Qty: 30 TABLET | Refills: 1 | Status: SHIPPED | OUTPATIENT
Start: 2020-05-18 | End: 2020-07-06 | Stop reason: SDUPTHER

## 2020-05-18 RX ORDER — GABAPENTIN 300 MG/1
300 CAPSULE ORAL DAILY
COMMUNITY
End: 2020-07-09 | Stop reason: SDUPTHER

## 2020-05-18 ASSESSMENT — SLEEP AND FATIGUE QUESTIONNAIRES
ESS TOTAL SCORE: 5
HOW LIKELY ARE YOU TO NOD OFF OR FALL ASLEEP WHILE SITTING INACTIVE IN A PUBLIC PLACE: 1
HOW LIKELY ARE YOU TO NOD OFF OR FALL ASLEEP WHILE SITTING AND READING: 1
HOW LIKELY ARE YOU TO NOD OFF OR FALL ASLEEP WHEN YOU ARE A PASSENGER IN A CAR FOR AN HOUR WITHOUT A BREAK: 1
HOW LIKELY ARE YOU TO NOD OFF OR FALL ASLEEP IN A CAR, WHILE STOPPED FOR A FEW MINUTES IN TRAFFIC: 0
HOW LIKELY ARE YOU TO NOD OFF OR FALL ASLEEP WHILE SITTING AND TALKING TO SOMEONE: 0
HOW LIKELY ARE YOU TO NOD OFF OR FALL ASLEEP WHILE LYING DOWN TO REST IN THE AFTERNOON WHEN CIRCUMSTANCES PERMIT: 0
HOW LIKELY ARE YOU TO NOD OFF OR FALL ASLEEP WHILE SITTING QUIETLY AFTER LUNCH WITHOUT ALCOHOL: 0
HOW LIKELY ARE YOU TO NOD OFF OR FALL ASLEEP WHILE WATCHING TV: 2

## 2020-05-18 NOTE — PROGRESS NOTES
hallucinations. Oriented to person/time/place. No anxiety. Normal judgement and insight. DATA reviewed by me:   Hemoglobin 14.7    Assessment:       · Snoring  · Daytime sleepiness and fatigue   · Chronic sleep maintenance and onset insomnia on Ambien 5 mg. Used to be on Ambien 10 mg for 10 years. Psychophysiological hyperarousal.  Rule out KENDALL. Failed Ambien and Belsomra. · Essential hypertension and hypercholesterolemia followed by cardiology  · Gabapentin use      Plan:    HST evaluate for sleep related breathing disorder. Treatment options were discussed with patient if HST reveals K[place]  APAP min pressure of 8 and max pressure of 16 cmH2O if positive HST   Discussed with patient the pathophysiology of apnea. Sleep hygiene  Cognitive behavioral therapy was discussed with patient today including sleep education, sleep restriction, stimulus control, and sleep hygiene. Trial of Trazodone 50 mg po QHST. May take another 50 mg if 50 mg is not effective. Risks, benefits and side effects were discussed with patient. D/C Ambien   Avoid sedatives, alcohol and caffeinated drinks at bed time. No driving motorized vehicles or operating heavy machinery while fatigue, drowsy or sleepy. Weight loss is also recommended as a long-term intervention. Complications of K[place] if not treated were discussed with patient patient to include systemic hypertension, pulmonary hypertension, cardiovascular morbidities, car accidents and all cause mortality. Taj Montiel is a 63 y.o. male being evaluated by a Virtual Visit (video visit) encounter to address concerns as mentioned above. A caregiver was present when appropriate. Due to this being a TeleHealth encounter (During AYTGQ-02 public health emergency), evaluation of the following organ systems was limited: Vitals/Constitutional/EENT/Resp/CV/GI//MS/Neuro/Skin/Heme-Lymph-Imm.   Pursuant to the emergency declaration under the [address] and the

## 2020-06-15 ENCOUNTER — HOSPITAL ENCOUNTER (OUTPATIENT)
Dept: SLEEP CENTER | Age: 65
Discharge: HOME | End: 2020-06-17
Payer: COMMERCIAL

## 2020-06-15 PROCEDURE — 95806 SLEEP STUDY UNATT&RESP EFFT: CPT

## 2020-06-17 ENCOUNTER — TELEPHONE (OUTPATIENT)
Dept: PULMONOLOGY | Age: 65
End: 2020-06-17

## 2020-07-06 RX ORDER — TRAZODONE HYDROCHLORIDE 50 MG/1
100 TABLET ORAL NIGHTLY
Qty: 60 TABLET | Refills: 0 | Status: SHIPPED | OUTPATIENT
Start: 2020-07-06 | End: 2020-07-09 | Stop reason: SDUPTHER

## 2020-07-06 NOTE — TELEPHONE ENCOUNTER
Pt called stating that he has had to take 2 tabs of Trazodone at night, so needing refill for #60. Script pending if appropriate.

## 2020-07-08 ENCOUNTER — TELEPHONE (OUTPATIENT)
Dept: FAMILY MEDICINE CLINIC | Age: 65
End: 2020-07-08

## 2020-07-09 RX ORDER — TRAZODONE HYDROCHLORIDE 50 MG/1
100 TABLET ORAL NIGHTLY
Qty: 90 TABLET | Refills: 1 | Status: SHIPPED | OUTPATIENT
Start: 2020-07-09 | End: 2020-11-03

## 2020-07-09 RX ORDER — GABAPENTIN 300 MG/1
300 CAPSULE ORAL DAILY
Qty: 90 CAPSULE | Refills: 3 | Status: SHIPPED | OUTPATIENT
Start: 2020-07-09 | End: 2020-08-19 | Stop reason: SDUPTHER

## 2020-08-06 ENCOUNTER — VIRTUAL VISIT (OUTPATIENT)
Dept: PULMONOLOGY | Age: 65
End: 2020-08-06
Payer: COMMERCIAL

## 2020-08-06 PROCEDURE — 99214 OFFICE O/P EST MOD 30 MIN: CPT | Performed by: INTERNAL MEDICINE

## 2020-08-06 RX ORDER — FLUTICASONE PROPIONATE 50 MCG
2 SPRAY, SUSPENSION (ML) NASAL DAILY
Qty: 1 BOTTLE | Refills: 5 | Status: SHIPPED | OUTPATIENT
Start: 2020-08-06 | End: 2021-02-11 | Stop reason: ALTCHOICE

## 2020-08-06 ASSESSMENT — SLEEP AND FATIGUE QUESTIONNAIRES
HOW LIKELY ARE YOU TO NOD OFF OR FALL ASLEEP WHILE WATCHING TV: 2
HOW LIKELY ARE YOU TO NOD OFF OR FALL ASLEEP WHILE SITTING QUIETLY AFTER LUNCH WITHOUT ALCOHOL: 0
HOW LIKELY ARE YOU TO NOD OFF OR FALL ASLEEP WHILE LYING DOWN TO REST IN THE AFTERNOON WHEN CIRCUMSTANCES PERMIT: 0
HOW LIKELY ARE YOU TO NOD OFF OR FALL ASLEEP WHILE SITTING AND READING: 2
HOW LIKELY ARE YOU TO NOD OFF OR FALL ASLEEP WHILE SITTING INACTIVE IN A PUBLIC PLACE: 0
HOW LIKELY ARE YOU TO NOD OFF OR FALL ASLEEP WHILE SITTING AND TALKING TO SOMEONE: 0
HOW LIKELY ARE YOU TO NOD OFF OR FALL ASLEEP WHEN YOU ARE A PASSENGER IN A CAR FOR AN HOUR WITHOUT A BREAK: 0
HOW LIKELY ARE YOU TO NOD OFF OR FALL ASLEEP IN A CAR, WHILE STOPPED FOR A FEW MINUTES IN TRAFFIC: 0
ESS TOTAL SCORE: 4

## 2020-08-06 NOTE — PROGRESS NOTES
[facility]                                                          TELEHEALTH EVALUATION: Service performed was Audio/Visual (During VNP-35 public health emergency) and not a face-to-face visit         CHIEF COMPLAINT: Follow-up KENDALL        HPI:   HST was reviewed by me and noted below. Results were dicussed with patient and multiple good questions were answered. Still trying to get used to the machine. Tried 3 different mask and now full face mask. Patient is using CPAP 4-5  hrs/night. Feels it is helping. Wake sup once to go to restroom. Using humidifier. Bedtime is 9:30-10 pm and rise time is 5 am. Sleep onset is few minutes. Congested at night in nose   Trazodone is working fine now- sleeps with it as Ambein            From prior visit:   Chronic insomnia for 10 years and has been on Ambien for 10 years. Mind racing not able to shut it off. Worse since early this year not better with Ambien 10 mg waking up several time/night and making him drowsy. Mild. Not sure what makes better or worse. Associated with sleep fragmentation. Started recently on Trazodone with not much help and went back on Ambien 5 mg. Gets to sleep fine, wakes up after after 3-4 hrs of sleep. Then he wakes every hr after that. He goes to bed 9:30 pm and get up 5. No naps. Sleep onset 15-20 min. Sleeps total 5 hrs/night. Trazodone, could not get a sleep and stay a sleep with it. Tried it for a week. Snoring at night when he is on his back. No witnessed apnea. At times fatigue and tired during the day. No RLS. 2 cups of coffee in am. No TV or electronics in bedroom.          Past Medical History:   Diagnosis Date    Elevated fasting glucose     Hyperlipidemia     Hypertension     Insomnia        Past Surgical History:        Procedure Laterality Date    COLONOSCOPY  11/20/13    HERNIA REPAIR  2006    [facility]  2004    WISDOM TOOTH EXTRACTION Allergies:  is allergic to codeine and penicillins. Social History:    TOBACCO:   reports that he has never smoked. He has never used smokeless tobacco.  ETOH:   reports current alcohol use. Family History:       Problem Relation Age of Onset    Diabetes Mother     Parkinsonism Father        Current Medications:    Current Outpatient Medications:     traZODone (DESYREL) 50 MG tablet, Take 2 tablets by mouth nightly, Disp: 90 tablet, Rfl: 1    gabapentin (NEURONTIN) 300 MG capsule, Take 1 capsule by mouth daily. , Disp: 90 capsule, Rfl: 3    carvedilol (COREG) 6.25 MG tablet, TAKE 1 TABLET BY MOUTH TWICE DAILY, Disp: 180 tablet, Rfl: 1    hydrochlorothiazide (HYDRODIURIL) 25 MG tablet, TAKE ONE TABLET BY MOUTH EVERY MORNING, Disp: 90 tablet, Rfl: 3    losartan (COZAAR) 50 MG tablet, TAKE ONE TABLET BY MOUTH DAILY, Disp: 90 tablet, Rfl: 3    simvastatin (ZOCOR) 20 MG tablet, TAKE 1 TABLET NIGHTLY, Disp: 90 tablet, Rfl: 1        Objective:   PHYSICAL EXAM:    There were no vitals taken for this visit.' on RA  O2 Sat:  HR:  BP:  RR:  Mallampati class III  Neck size 17   Temperature:  Constitutional:  No acute distress. Appears well developed and nourished. Eyes: No sclera icterus. EOM intact. No visible discharge. HENT: Head is normocephalic and atraumatic. Mucus membranes are moist and the tongue appears normal. Normal appearing nose. External Ears normal.   Neck: No visualized mass. Ladarius Jacky is midline   Resp: No accessory muscle use. Respiratory effort normal. No visualized signs of difficulty breathing or respiratory distress. Cardiovascular: No LE edema. Musculoskeletal: Normal gait with no signs of ataxia. Normal range of motion of the neck. Skin: No significant exanthematous lesions or discoloration noted on facial skin    Neuro: Awake. Alert. Able to follow commands. No facial asymmetry. No gaze palsy. Psych: No agitation. Normal affect. No hallucinations.   Oriented to person/time/place. No anxiety. Normal judgement and insight. DATA reviewed by me:   HST 6/15/2020 9.6        CPAP data 07/05-08/03 2020 reviewed by me. Uses 4-5 hrs/night with 67% compliance and AHI of 2.4. median 9.1 and 95% percentile 12.2. APAP 8-16. CPAP data 07/07-08/05 2020 reviewed by me. Uses 4-5 hrs/night with 70% compliance and AHI of 2.1.  median 9.2 and 95% percentile 12.2. APAP 8-16. Assessment:       · Mild KENDALL. Full face mask. APAP 8-16 cmH2O. Optimal compliance and efficacy upon review today. · Nasal congestion  · Chronic sleep maintenance and onset insomnia on Ambien 5 mg. Used to be on Ambien 10 mg for 10 years. Psychophysiological hyperarousal. Failed Ambien and Belsomra. · Essential hypertension and hypercholesterolemia followed by cardiology  · Gabapentin use      Plan:    Change APAP 9-13 cm H2O. Trial of Flonase 2 sprays/nostril daily  Continue with full face mask   Advised to use CPAP 6-8 hrs at night and during naps. Replacement of mask, tubing, head straps every 3-6 months or sooner if damaged. Follow up CPAP compliance and pressure adjustment if needed  Cognitive behavioral therapy was discussed with patient today including sleep education, sleep restriction, stimulus control, and sleep hygiene. Continue Trazodone 100 mg po QHST. Risks, benefits and side effects were discussed with patient. Avoid sedatives, alcohol and caffeinated drinks at bed time. No driving motorized vehicles or operating heavy machinery while fatigue, drowsy or sleepy. Weight loss is also recommended as a long-term intervention. Follow up in 2-3 months           Scooter Nj is a 63 y.o. male being evaluated by a Virtual Visit (video visit) encounter to address concerns as mentioned above. A caregiver was present when appropriate.  Due to this being a TeleHealth encounter (During [place] [address] public health emergency), evaluation of the following organ systems was limited: Vitals/Constitutional/EENT/Resp/CV/GI//MS/Neuro/Skin/Heme-Lymph-Imm. Pursuant to the emergency declaration under the [address] [place] [address] and the [employer] Act, this Virtual Visit was conducted with patient's (and/or legal guardian's) consent, to reduce the patient's risk of exposure to COVID-19 and provide necessary medical care. The patient (and/or legal guardian) has also been advised to contact this office for worsening conditions or problems, and seek emergency medical treatment and/or call 911 if deemed necessary. Services were provided through a video synchronous discussion virtually to substitute for in-person clinic visit. Patient was located in her home, provider was located in his office. --Ladarius Hassan MD on 8/6/2020 at 3:00 PM    An electronic signature was used to authenticate this note.

## 2020-08-07 RX ORDER — FLUTICASONE PROPIONATE 50 MCG
SPRAY, SUSPENSION (ML) NASAL
Qty: 48 G | OUTPATIENT
Start: 2020-08-07

## 2020-08-17 RX ORDER — LISINOPRIL AND HYDROCHLOROTHIAZIDE 20; 12.5 MG/1; MG/1
TABLET ORAL
Qty: 90 TABLET | Refills: 3 | Status: SHIPPED | OUTPATIENT
Start: 2020-08-17 | End: 2020-08-19

## 2020-08-19 ENCOUNTER — OFFICE VISIT (OUTPATIENT)
Dept: FAMILY MEDICINE CLINIC | Age: 65
End: 2020-08-19
Payer: COMMERCIAL

## 2020-08-19 VITALS
SYSTOLIC BLOOD PRESSURE: 146 MMHG | TEMPERATURE: 97.1 F | WEIGHT: 227 LBS | DIASTOLIC BLOOD PRESSURE: 76 MMHG | HEART RATE: 59 BPM | BODY MASS INDEX: 29.95 KG/M2 | OXYGEN SATURATION: 95 %

## 2020-08-19 LAB
A/G RATIO: 1.8 (ref 1.1–2.2)
ALBUMIN SERPL-MCNC: 4.3 G/DL (ref 3.4–5)
ALP BLD-CCNC: 64 U/L (ref 40–129)
ALT SERPL-CCNC: 24 U/L (ref 10–40)
ANION GAP SERPL CALCULATED.3IONS-SCNC: 9 MMOL/L (ref 3–16)
AST SERPL-CCNC: 21 U/L (ref 15–37)
BILIRUB SERPL-MCNC: 0.4 MG/DL (ref 0–1)
BUN BLDV-MCNC: 17 MG/DL (ref 7–20)
CALCIUM SERPL-MCNC: 9.3 MG/DL (ref 8.3–10.6)
CHLORIDE BLD-SCNC: 106 MMOL/L (ref 99–110)
CHOLESTEROL, TOTAL: 140 MG/DL (ref 0–199)
CO2: 26 MMOL/L (ref 21–32)
CREAT SERPL-MCNC: 0.8 MG/DL (ref 0.8–1.3)
GFR AFRICAN AMERICAN: >60
GFR NON-AFRICAN AMERICAN: >60
GLOBULIN: 2.4 G/DL
GLUCOSE BLD-MCNC: 110 MG/DL (ref 70–99)
HDLC SERPL-MCNC: 35 MG/DL (ref 40–60)
LDL CHOLESTEROL CALCULATED: 84 MG/DL
POTASSIUM SERPL-SCNC: 4.3 MMOL/L (ref 3.5–5.1)
SODIUM BLD-SCNC: 141 MMOL/L (ref 136–145)
TOTAL PROTEIN: 6.7 G/DL (ref 6.4–8.2)
TRIGL SERPL-MCNC: 103 MG/DL (ref 0–150)
VLDLC SERPL CALC-MCNC: 21 MG/DL

## 2020-08-19 PROCEDURE — 36415 COLL VENOUS BLD VENIPUNCTURE: CPT | Performed by: FAMILY MEDICINE

## 2020-08-19 PROCEDURE — 99396 PREV VISIT EST AGE 40-64: CPT | Performed by: FAMILY MEDICINE

## 2020-08-19 RX ORDER — ATORVASTATIN CALCIUM 40 MG/1
40 TABLET, FILM COATED ORAL DAILY
Qty: 90 TABLET | Refills: 3 | Status: SHIPPED | OUTPATIENT
Start: 2020-08-19 | End: 2021-02-18 | Stop reason: SDUPTHER

## 2020-08-19 RX ORDER — GABAPENTIN 300 MG/1
300 CAPSULE ORAL DAILY
Qty: 90 CAPSULE | Refills: 3 | Status: SHIPPED | OUTPATIENT
Start: 2020-08-19 | End: 2021-02-18 | Stop reason: SDUPTHER

## 2020-08-19 RX ORDER — LISINOPRIL AND HYDROCHLOROTHIAZIDE 20; 12.5 MG/1; MG/1
TABLET ORAL
Qty: 90 TABLET | Refills: 3 | Status: SHIPPED | OUTPATIENT
Start: 2020-08-19 | End: 2021-08-04 | Stop reason: SDUPTHER

## 2020-08-19 NOTE — PROGRESS NOTES
2020     Octavia Greene (:  [id number])GS a 63 y.o. male, here for evaluation of the following medical concerns:    CC:    HPI     Insomnia  Uncontrolled, tapered down to 5mg of ambien but this has made it difficult to sleep     HTN  Controlled, on cozaar, coreg, and HCTZ     hld   Taking zocor  There are no diagnoses linked to this encounter. Review of Systems    Prior to Visit Medications    Medication Sig Taking? Authorizing Provider   lisinopril-hydroCHLOROthiazide (PRINZIDE;ZESTORETIC) 20-12.5 MG per tablet TAKE 1 TABLET EVERY MORNING Yes JACE Richards - CNP   fluticasone (FLONASE) 50 MCG/ACT nasal spray 2 sprays by Nasal route daily Yes aPtricia Stanley MD   traZODone (DESYREL) 50 MG tablet Take 2 tablets by mouth nightly Yes Patricia Stanley MD   gabapentin (NEURONTIN) 300 MG capsule Take 1 capsule by mouth daily. Yes Anni Milligan MD   carvedilol (COREG) 6.25 MG tablet TAKE 1 TABLET BY MOUTH TWICE DAILY Yes Reji Lowe MD   hydrochlorothiazide (HYDRODIURIL) 25 MG tablet TAKE ONE TABLET BY MOUTH EVERY MORNING Yes Anni Milligan MD   losartan (COZAAR) 50 MG tablet TAKE ONE TABLET BY MOUTH DAILY Yes Anni Milligan MD   simvastatin (ZOCOR) 20 MG tablet TAKE 1 TABLET NIGHTLY Yes Dennis Marin MD        Social History     Tobacco Use    Smoking status: Never Smoker    Smokeless tobacco: Never Used   Substance Use Topics    Alcohol use: Yes     Comment: beer every 1-2 weeks        Vitals:    20 0759   BP: (!) 154/78   Pulse: 59   Temp: 97.1 °F (36.2 °C)   SpO2: 95%   Weight: 227 lb (103 kg)     Estimated body mass index is 29.95 kg/m² as calculated from the following:    Height as of 20: 6' 1\" (1.854 m). Weight as of this encounter: 227 lb (103 kg). Physical Exam  Constitutional: appears well-developed and well-nourished. No distress. HENT:   Head: Normocephalic and atraumatic   Eyes: EOM are normal. Right eye exhibits no discharge.  Left eye exhibits no

## 2020-08-19 NOTE — PROGRESS NOTES
on file   [employer] needs     Medical: Not on file     Non-medical: Not on file   Tobacco Use    Smoking status: Never Smoker    Smokeless tobacco: Never Used   Substance and Sexual Activity    Alcohol use: Yes     Comment: beer every 1-2 weeks    Drug use: No    Sexual activity: Yes     Partners: Female   Lifestyle    Physical activity     Days per week: Not on file     Minutes per session: Not on file    Stress: Not on file   Relationships    Social connections     Talks on phone: Not on file     Gets together: Not on file     Attends Yazidi service: Not on file     Active member of club or organization: Not on file     Attends meetings of clubs or organizations: Not on file     Relationship status: Not on file    Intimate partner violence     Fear of current or ex partner: Not on file     Emotionally abused: Not on file     Physically abused: Not on file     Forced sexual activity: Not on file   Other Topics Concern    Not on file   Social History Narrative    Not on file        Family History   Problem Relation Age of Onset    Diabetes Mother     Parkinsonism Father        ADVANCE DIRECTIVE: N, <no information>    Vitals:    08/19/20 0759   BP: (!) 154/78   Pulse: 59   Temp: 97.1 °F (36.2 °C)   SpO2: 95%   Weight: 227 lb (103 kg)     Estimated body mass index is 29.95 kg/m² as calculated from the following:    Height as of 5/18/20: 6' 1\" (1.854 m). Weight as of this encounter: 227 lb (103 kg). Physical Exam  Constitutional: Patient appears well-developed and well-nourished   Ears, Nose, Mouth & Throat: external inspection of ears and nose show no scars, lesions or masses, pt can hear finger rub bilaterally  Neck: neck is supple. No thyromegaly present   Cardiovascular: Normal rate. No lower ext edema present  Respiratory: Effort normal and breath sounds normal. No respiratory distress. No W/R/R  Gastrointestinal: Soft. There is no tenderness.  No hernias  Musculoskeletal: Normal range Td) 02/09/2025    Shingles Vaccine  Completed    Hepatitis C screen  Completed    HIV screen  Completed    Hepatitis A vaccine  Aged Out    Hepatitis B vaccine  Aged Out    Hib vaccine  Aged Out    Meningococcal (ACWY) vaccine  Aged Out    Pneumococcal 0-64 years Vaccine  Aged Out       ASSESSMENT/PLAN:    Screening blood work as below  Colonoscopy up to date  tobacco screening neg    Becca Culp was seen today for 6 month follow-up. Diagnoses and all orders for this visit:    Healthcare maintenance  -     Comprehensive Metabolic Panel  -     Lipid Panel  -     Hemoglobin A1C    Essential hypertension, benign  Above goal, pt to take 2 tabs of lisinopril/hctz and return in 2 wks for BP check  -     lisinopril-hydroCHLOROthiazide (PRINZIDE;ZESTORETIC) 20-12.5 MG per tablet; TAKE 2 TABLET EVERY MORNING    Congested nose  -     [place] - Marci Jeans, DO, Otolaryngology, [facility]    Chronic left shoulder pain  -     gabapentin (NEURONTIN) 300 MG capsule; Take 1 capsule by mouth daily. Mixed hyperlipidemia  -     atorvastatin (LIPITOR) 40 MG tablet; Take 1 tablet by mouth daily    Return in about 2 weeks (around 9/2/2020) for bp check. An electronic signature was used to authenticate this note.     --Jayy Marie MD on 8/19/2020 at 8:50 AM

## 2020-08-20 LAB
ESTIMATED AVERAGE GLUCOSE: 108.3 MG/DL
HBA1C MFR BLD: 5.4 %

## 2020-08-26 ENCOUNTER — OFFICE VISIT (OUTPATIENT)
Dept: ENT CLINIC | Age: 65
End: 2020-08-26
Payer: COMMERCIAL

## 2020-08-26 VITALS
SYSTOLIC BLOOD PRESSURE: 133 MMHG | WEIGHT: 228.2 LBS | HEART RATE: 60 BPM | DIASTOLIC BLOOD PRESSURE: 85 MMHG | BODY MASS INDEX: 30.24 KG/M2 | TEMPERATURE: 97.3 F | HEIGHT: 73 IN

## 2020-08-26 PROCEDURE — 31575 DIAGNOSTIC LARYNGOSCOPY: CPT | Performed by: OTOLARYNGOLOGY

## 2020-08-26 PROCEDURE — 99203 OFFICE O/P NEW LOW 30 MIN: CPT | Performed by: OTOLARYNGOLOGY

## 2020-08-26 RX ORDER — OMEPRAZOLE 20 MG/1
40 CAPSULE, DELAYED RELEASE ORAL
Qty: 120 CAPSULE | Refills: 1 | Status: SHIPPED | OUTPATIENT
Start: 2020-08-26 | End: 2020-09-24 | Stop reason: SDUPTHER

## 2020-08-26 NOTE — PROGRESS NOTES
Brittany 61- HEAD & NECK SURGERY  NEW PATIENT HISTORY AND PHYSICAL NOTE      [facility] Number:  [id number]  Primary Care Physician:  Amari Guerrero MD    ChiefComplaint     Chief Complaint   Patient presents with    Nasal Congestion     Has had a dry cough that (PCP believed was related to BP meds) cough has been going on for about 2 years. When he lays down he seems to get congested and has trouble breathing through his nosse, even with his CPAP machine. Has tried using flonase but it does not seem to help either, has been usuing it for two weeks. History of Present Illness     Scooter Nj is an 63 y.o. male history of nasal congestion and chronic cough. Cough has been ongoing for the past 10 years - is dry, intermittent throughout the day, non-productive, with no defined exacerbating triggers or alleviating factors. He believes his throat is always dry, and occasionally taking a throat lozenge will improve his dry throat and may help his cough. Prior use of lisinopril, however his primary care provider discontinued this 02/2020 and he continues to have cough. Occasional heartburn depending on diet (with spicy foods), but otherwise no hoarseness, dysphagia, odynophagia, dyspnea, hoarseness. Also with nasal congestion, feels that his nose closes up when supine. No rhinorrhea, post-nasal drip, Has KENDALL, on CPAP machine. Has been on fluticasone for 2 weeks prior to bedtime (believes this helps initially)    Has history of elevated fasting glucose but most recent BMP was normal (glucose 110). Takes carvedilol for HTN. Hemoglobin A1c 5.4 on 08/2020. No history of head and neck imaging, sinus surgery or nasal trauma. No history of tobacco use, 2 cups of coffee daily, soda 1/week, spicy foods 2-3x/month. Minimal use of water daily.      Past Medical History     Past Medical History:   Diagnosis Date    Elevated fasting glucose     Hyperlipidemia     Hypertension     Insomnia     Sleep apnea        Past Surgical History     Past Surgical History:   Procedure Laterality Date    COLONOSCOPY  11/20/13    [facility]IA [facility]  2006    [facility]  2004    WISDOM TOOTH EXTRACTION         Family History     Family History   Problem Relation Age of Onset    Diabetes Mother     Parkinsonism Father        Social History     Social History     Tobacco Use    Smoking status: Never Smoker    Smokeless tobacco: Never Used   Substance Use Topics    Alcohol use: Yes     Comment: beer every 1-2 weeks    Drug use: No        Allergies     Allergies   Allergen Reactions    Codeine Rash    Penicillins Rash       Medications     Current Outpatient Medications   Medication Sig Dispense Refill    omeprazole (PRILOSEC) 20 MG delayed release capsule Take 2 capsules by mouth 2 times daily (before meals) 120 capsule 1    lisinopril-hydroCHLOROthiazide (PRINZIDE;ZESTORETIC) 20-12.5 MG per tablet TAKE 2 TABLET EVERY MORNING 90 tablet 3    gabapentin (NEURONTIN) 300 MG capsule Take 1 capsule by mouth daily. 90 capsule 3    atorvastatin (LIPITOR) 40 MG tablet Take 1 tablet by mouth daily 90 tablet 3    fluticasone (FLONASE) 50 MCG/ACT nasal spray 2 sprays by Nasal route daily 1 Bottle 5    traZODone (DESYREL) 50 MG tablet Take 2 tablets by mouth nightly 90 tablet 1    carvedilol (COREG) 6.25 MG tablet TAKE 1 TABLET BY MOUTH TWICE DAILY 180 tablet 1    simvastatin (ZOCOR) 20 MG tablet TAKE 1 TABLET NIGHTLY 90 tablet 1     No current facility-administered medications for this visit.         Review of Systems     REVIEW OF SYSTEMS  The following systems were reviewed and revealed the following in addition to any already discussed in the HPI:    CONSTITUTIONAL: No weight loss, no fever, no night sweats, no chills  EYES: no vision changes, no blurry vision  EARS: no changes in hearing, no otalgia  NOSE: no epistaxis, no rhinorrhea  RESPIRATORY: no difficulty breathing, no shortness of breath  CV: no chest pain, no peripheral vascular disease  HEME: No coagulation disorder, no bleeding disorder  NEURO: No TIA or stroke-like symptoms  SKIN: No new rashes in the head and neck, no recent skin cancers  MOUTH: No new ulcers, no recent teeth infections  GASTROINTESTINAL: No diarrhea, stomach pain  PSYCH: No anxiety, no depression    PhysicalExam     Vitals:    08/26/20 1515   BP: 133/85   Site: Right Upper Arm   Position: Sitting   Cuff Size: Large Adult   Pulse: 60   Temp: 97.3 °F (36.3 °C)   TempSrc: Infrared   Weight: 228 lb 3.2 oz (103.5 kg)   Height: 6' 1\" (1.854 m)       PHYSICAL EXAM  /85 (Site: Right Upper Arm, Position: Sitting, Cuff Size: Large Adult)   Pulse 60   Temp 97.3 °F (36.3 °C) (Infrared)   Ht 6' 1\" (1.854 m)   Wt 228 lb 3.2 oz (103.5 kg)   BMI 30.11 kg/m²     GENERAL: No Acute Distress, Alert and Oriented, no hoarseness  EYES: EOMI, Anti-icteric  NOSE: On anterior rhinoscopy there is no epistaxis, nasal mucosa within normal limits, no purulent drainage  EARS: Normal external appearance; on portable otomicroscopy:  -Right ear: External auditory canal without stenosis, tympanic membrane clear, no middle ear effusions or retractions  -Left ear: External auditory canal without stenosis, tympanic membrane clear, no middle ear effusions or retractions  FACE: 1/6 House-Brackmann Scale, symmetric, sensation equal bilaterally  ORAL CAVITY: No masses or lesions palpated, uvula is midline, moist mucous membranes, 1+ tonsils, good dentition  -Dental mirror exam: Base of tongue with no masses, uvula anatomically normal   NECK: Normal range of motion, no thyromegaly, trachea is midline, no lymphadenopathy, no neck masses, no crepitus  CHEST: Normal respiratory effort, no retractions, breathing comfortably  SKIN: No rashes, normal appearing skin, no evidence of skin lesions/tumors  NEURO: CN 2, 3, 4, 5, 6, 7, 11, 12 intact bilaterally     Data/Imaging Review Procedure     Flexible Laryngoscopy    Pre op: Chronic cough. Post op: LPR, left TVC granuloma  Procedure : Flexible Nasopharyngolaryngoscopy  Surgeon: DANUTA Carnes  Anesthesia: Afrin with 2% lidocaine  Estimated Blood Loss: None    Procedure:   Flexible Laryngoscopy     After obtaining consent, the patient was placed in the examination chair in the upright position. Decongestant and topical anesthetic was sprayed in the After allowing adequate time for hemostatic effect, the flexible 4 mm laryngoscope was passed via the left nasal passage    Nasal Septum: No acute septal deviation, no septal hematoma or perforations noted   Nasal Findings: No active hemorrhage, no nasal masses appreciated   Nasopharynx: Clear, no masses or inflammation  Oropharynx: Bilateral tonsillar tissue present, no exophytic masses  Base of Tongue: Lingual tonsils within normal limits, vallecula without effacement  Epiglottis: Upright, in normal anatomical position  True Vocal Cord: Anatomically normal, vocal cord granuloma noted, normal abduction and adduction   False Vocal Cord: normal   Hypopharynx Mucosa: No masses or inflammation of the piriform sinuses or postcricoid area  Arytenoids: Normal mucosa, no dislocation appreciated      * Patient tolerated the procedure well with no complications   * Patient was instructed not to eat for 30 minutes following procedure. * Patient was instructed that they may notice minor bleeding. Attestation:   I was present for the entire viewing, including introduction and removal of the scope. Reflux Finding Score:  Subglottic edema 0?=?absent  2? =?present    Ventricular obl. 2?=?partial  4? =?complete    Erythema/hyperemia 2? =?arytenoids only  4? =?diffuse    Vocal fold edema 1? =?mild  2? =?moderate  3? =?severe  4? =?polypoid    Diffuse laryngeal  1? =?mild  edema   2? =?moderate  3? =?severe  4?=?obstructing    Posterior   1? =?mild  commissure 2?=?moderate  hypertrophy  3? =?severe  4?=?obstructing    Granuloma/  0?=?absent  granulation tissue 2? =?present    Thick endolaryngeal  0?=?absent  mucus   2? =?present  Abbreviation: RFS, Reflux Finding Score. RSF?>?7? =? Laryngo Pharyngeal Reflux (LPR). Total Score: 10                Assessment and Plan     1. 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 Aside the patient on omeprazole twice daily, and cut back if subjective symptoms and chronic cough improved. If no improvement in granuloma, will consider direct laryngoscopy and biopsy under general anesthesia, the patient does not have a history of smoking nor any family history of head neck cancer.  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 Chronic cough  61-year-old male with chief complaint of chronic cough, that is been ongoing for 10 years, no known triggers however believes that his throat is consistently dry. He also has nasal obstruction when supine. We outlined the differential diagnosis including reflux, postnasal drip, cough variant asthma, neurogenic cough etc.  He is currently on Flonase which he has been using for the past 2 weeks, will encourage him to continue this. On examination we appreciate laryngopharyngeal reflux per the reflux finding score, left vocal cord granuloma, and we will start him on antireflux medications. He will follow-up in 4 weeks. Return in about 4 weeks (around 9/23/2020).     Foreign Nava MD  [facility] and Neck Surgery  8/26/20    I have performed a head and neck physical exam personally or was physically present during the key or critical portions of the service. Medical Decision Making: The following items were considered in medical decision making:  Independent review of images  Review / order clinical lab tests  Review / order radiology tests  Decision to obtain old records  Review and summation of old records as accessed through [email or web address] (a summary of my findings in these old records: None)     Portions of this note were dictated using Dragon.  There may be linguistic errors secondary to the use of this program.

## 2020-09-24 ENCOUNTER — OFFICE VISIT (OUTPATIENT)
Dept: ENT CLINIC | Age: 65
End: 2020-09-24
Payer: COMMERCIAL

## 2020-09-24 VITALS
WEIGHT: 226.4 LBS | RESPIRATION RATE: 16 BRPM | SYSTOLIC BLOOD PRESSURE: 149 MMHG | BODY MASS INDEX: 30 KG/M2 | DIASTOLIC BLOOD PRESSURE: 87 MMHG | TEMPERATURE: 97.7 F | HEIGHT: 73 IN | HEART RATE: 56 BPM

## 2020-09-24 PROCEDURE — 31575 DIAGNOSTIC LARYNGOSCOPY: CPT | Performed by: OTOLARYNGOLOGY

## 2020-09-24 PROCEDURE — 99214 OFFICE O/P EST MOD 30 MIN: CPT | Performed by: OTOLARYNGOLOGY

## 2020-09-24 RX ORDER — AZELASTINE 1 MG/ML
1 SPRAY, METERED NASAL NIGHTLY
Qty: 1 BOTTLE | Refills: 1 | Status: SHIPPED | OUTPATIENT
Start: 2020-09-24 | End: 2021-02-11 | Stop reason: ALTCHOICE

## 2020-09-24 RX ORDER — OMEPRAZOLE 20 MG/1
20 CAPSULE, DELAYED RELEASE ORAL
Qty: 120 CAPSULE | Refills: 1 | Status: SHIPPED | OUTPATIENT
Start: 2020-09-24 | End: 2020-11-03 | Stop reason: SDUPTHER

## 2020-09-24 NOTE — PROGRESS NOTES
[address] UP VISIT      Patient Name: Kayode [facility] Record Number:  [id number]  Primary Care Physician:  Kasandra Lott MD    ChiefComplaint     Chief Complaint   Patient presents with    Follow-up     Patient states that he has been about the same since his last appointment, states that nothing is better and nothing is worse. Has no further questions, comments or concerns. History of Present Illness     Deny Rojas is an 63 y.o. male history of nasal congestion and chronic cough.     Cough has been ongoing for the past 10 years - is dry, intermittent throughout the day, non-productive, with no defined exacerbating triggers or alleviating factors. He believes his throat is always dry, and occasionally taking a throat lozenge will improve his dry throat and may help his cough. Prior use of lisinopril, however his primary care provider discontinued this 02/2020 and he continues to have cough. Occasional heartburn depending on diet (with spicy foods), but otherwise no hoarseness, dysphagia, odynophagia, dyspnea, hoarseness.     Also with nasal congestion, feels that his nose closes up when supine. No rhinorrhea, post-nasal drip, Has KENDALL, on CPAP machine. Has been on fluticasone for 2 weeks prior to bedtime (believes this helps initially)     Has history of elevated fasting glucose but most recent BMP was normal (glucose 110). Takes carvedilol for HTN. Hemoglobin A1c 5.4 on 08/2020. No history of head and neck imaging, sinus surgery or nasal trauma.     No history of tobacco use, 2 cups of coffee daily, soda 1/week, spicy foods 2-3x/month. Minimal use of water daily. Interval History 9/24/2020: Since last visit he has been using omeprazole 40 mg twice daily as prescribed. Continues to drink 2 cups of coffee daily, 1 soda a week and uses spicy foods.   Has increased hydration, although is concerned that this may be leading to him going to the bathroom more and in the middle of the night. His chronic cough has resolved- no longer states nonproductive, intermittent cough; occasional throat dryness, no water brash, hoarseness or chest burning. He continues to have nasal congestion every night. Goes to bed at approximately 930, and by about 1-1:30 in the morning he typically removes his CPAP as he feels he cannot breathe through his nose. No postnasal drip, anterior rhinorrhea, nasal pain, epistaxis. Has been using fluticasone as prescribed without improvement in symptoms. Past Medical History     Past Medical History:   Diagnosis Date    Elevated fasting glucose     Hyperlipidemia     Hypertension     Insomnia     Sleep apnea        Past Surgical History     Past Surgical History:   Procedure Laterality Date    COLONOSCOPY  11/20/13    [facility]ERN[facility]  2006    [facility]    WISDOM TOOTH EXTRACTION         Family History     Family History   Problem Relation Age of Onset    Diabetes Mother     Parkinsonism Father        Social History     Social History     Tobacco Use    Smoking status: Never Smoker    Smokeless tobacco: Never Used   Substance Use Topics    Alcohol use: Yes     Comment: beer every 1-2 weeks    Drug use: No        Allergies     Allergies   Allergen Reactions    Codeine Rash    Penicillins Rash       Medications     Current Outpatient Medications   Medication Sig Dispense Refill    azelastine (ASTELIN) 0.1 % nasal spray 1 spray by Nasal route nightly Use in each nostril as directed 1 Bottle 1    omeprazole (PRILOSEC) 20 MG delayed release capsule Take 1 capsule by mouth 2 times daily (before meals) 120 capsule 1    lisinopril-hydroCHLOROthiazide (PRINZIDE;ZESTORETIC) 20-12.5 MG per tablet TAKE 2 TABLET EVERY MORNING 90 tablet 3    gabapentin (NEURONTIN) 300 MG capsule Take 1 capsule by mouth daily.  90 capsule 3    atorvastatin (LIPITOR) 40 MG tablet Take 1 tablet by mouth daily 90 tablet 3    fluticasone (FLONASE) 50 MCG/ACT nasal spray 2 sprays by Nasal route daily 1 Bottle 5    traZODone (DESYREL) 50 MG tablet Take 2 tablets by mouth nightly 90 tablet 1    carvedilol (COREG) 6.25 MG tablet TAKE 1 TABLET BY MOUTH TWICE DAILY 180 tablet 1    simvastatin (ZOCOR) 20 MG tablet TAKE 1 TABLET NIGHTLY 90 tablet 1     No current facility-administered medications for this visit.         Review of Systems     REVIEW OF SYSTEMS  The following systems were reviewed and revealed the following in addition to any already discussed in the HPI:    CONSTITUTIONAL: No weight loss, no fever, no night sweats, no chills  EYES: no vision changes, no blurry vision  EARS: no changes in hearing, no otalgia  NOSE: no epistaxis, no rhinorrhea  RESPIRATORY: No difficulty breathing, no shortness of breath  CV: no chest pain, no peripheral vascular disease  HEME: No coagulation disorder, no bleeding disorder  NEURO: No TIA or stroke-like symptoms  SKIN: No new rashes in the head and neck, no recent skin cancers  MOUTH: Delete no new ulcers, no recent teeth infections  GASTROINTESTINAL: No diarrhea, stomach pain  PSYCH: No anxiety, no depression    PhysicalExam     Vitals:    09/24/20 1517   BP: (!) 149/87   Site: Left Upper Arm   Position: Sitting   Cuff Size: Medium Adult   Pulse: 56   Resp: 16   Temp: 97.7 °F (36.5 °C)   TempSrc: Infrared   Weight: 226 lb 6.4 oz (102.7 kg)   Height: 6' 1\" (1.854 m)       PHYSICAL EXAM  BP (!) 149/87 (Site: Left Upper Arm, Position: Sitting, Cuff Size: Medium Adult)   Pulse 56   Temp 97.7 °F (36.5 °C) (Infrared)   Resp 16   Ht 6' 1\" (1.854 m)   Wt 226 lb 6.4 oz (102.7 kg)   BMI 29.87 kg/m²     GENERAL: No Acute Distress, Alert and Oriented, no hoarseness  EYES: EOMI, Anti-icteric  NOSE: On anterior rhinoscopy there is no epistaxis, nasal mucosa within normal limits, no purulent drainage  EARS: Normal external appearance; on portable otomicroscopy:  -Right ear: External auditory canal without stenosis, tympanic membrane clear, no middle ear effusions or retractions  -Left ear: External auditory canal without stenosis, tympanic membrane clear, no middle ear effusions or retractions  FACE: 1/6 House-Brackmann Scale, symmetric, sensation equal bilaterally  ORAL CAVITY: No masses or lesions palpated, uvula is midline, moist mucous membranes, 1+ tonsils, good dentition  -Dental mirror exam: Base of tongue with no masses, uvula anatomically normal   NECK: Normal range of motion, no thyromegaly, trachea is midline, no lymphadenopathy, no neck masses, no crepitus  CHEST: Normal respiratory effort, no retractions, breathing comfortably  SKIN: No rashes, normal appearing skin, no evidence of skin lesions/tumors  NEURO: CN 2, 3, 4, 5, 6, 7, 11, 12 intact bilaterally     Procedure     Flexible Laryngoscopy     Pre op: Chronic cough. Post op: LPR, left TVC granuloma  Procedure : Flexible Nasopharyngolaryngoscopy  Surgeon: DANUTA Carnes  Anesthesia: Afrin with 2% lidocaine  Estimated Blood Loss: None     Procedure:   Flexible Laryngoscopy      After obtaining consent, the patient was placed in the examination chair in the upright position.   Decongestant and topical anesthetic was sprayed in the After allowing adequate time for hemostatic effect, the flexible 4 mm laryngoscope was passed via the left nasal passage     Nasal Septum: No acute septal deviation, no septal hematoma or perforations noted   Nasal Findings: No active hemorrhage, no nasal masses appreciated   Nasopharynx: Clear, no masses or inflammation  Oropharynx: Bilateral tonsillar tissue present, no exophytic masses  Base of Tongue: Lingual tonsils within normal limits, vallecula without effacement  Epiglottis: Upright, in normal anatomical position  True Vocal Cord: Anatomically normal, vocal cord granuloma noted, normal abduction and adduction   False Vocal Cord: normal   Hypopharynx Mucosa: No masses or inflammation of the piriform sinuses or postcricoid area  Arytenoids: Normal mucosa, no dislocation appreciated       * Patient tolerated the procedure well with no complications   * Patient was instructed not to eat for 30 minutes following procedure. * Patient was instructed that they may notice minor bleeding.     Attestation:   I was present for the entire viewing, including introduction and removal of the scope.   its, vallecula without effacement  Epiglottis: Upright, in normal anatomical position  True Vocal Cord: Anatomically normal, no masses or inflammation, normal abduction and adduction   False Vocal Cord: normal   Hypopharynx Mucosa: No masses or inflammation of the piriform sinuses or postcricoid area  Arytenoids: Normal mucosa, no dislocation appreciated     * Patient tolerated the procedure well with no complications   * Patient was instructed not to eat for 30 minutes following procedure. * Patient was instructed that they may notice minor bleeding. Attestation:   I was present for the entire viewing, including introduction and removal of the scope. Reflux Finding Score:  Subglottic edema 0?=?absent  2? =?present    Ventricular obl. 2?=?partial  4? =?complete    Erythema/hyperemia 2? =?arytenoids only  4? =?diffuse    Vocal fold edema 1? =?mild  2? =?moderate  3? =?severe  4? =?polypoid    Diffuse laryngeal  1? =?mild  edema   2? =?moderate  3? =?severe  4?=?obstructing    Posterior   1? =?mild  commissure   2? =?moderate  hypertrophy  3? =?severe  4?=?obstructing    Granuloma/  0?=?absent  granulation tissue 2? =?present    Thick endolaryngeal  0?=?absent  mucus   2? =?present  Abbreviation: RFS, Reflux Finding Score. RSF?>?7? =? Laryngo Pharyngeal Reflux (LPR). Total Score: 5                Assessment and Plan     1. Laryngopharyngeal reflux (LPR)  Patient with chronic cough which we attributed to laryngopharyngeal reflux.   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 As he has been doing well with omeprazole 40 mg twice daily, we decrease his medication to 20 mg twice daily, and eventually cut this down  - omeprazole (PRILOSEC) 20 MG delayed release capsule; Take 1 capsule by mouth 2 times daily (before meals)  Dispense: 120 capsule; Refill: 1    2. Nasal congestion  Patient with nasal congestion, worse in the dependent position- involves over 4-5 hours while he is supine, and eventually he cannot breathe through his nose (even with CPAP in place). This may represent vasogenic edema of the nose, we will start him on Astelin in addition to Flonase at night, and we have asked that he raise his head of bed to 30 degrees to prevent him from sleeping in a dependent position. States will attempt. - azelastine (ASTELIN) 0.1 % nasal spray; 1 spray by Nasal route nightly Use in each nostril as directed  Dispense: 1 Bottle; Refill: 1    3. Laryngeal mass  On prior examination we appreciated a mass over the left vocal process of the arytenoid which we attributed to laryngopharyngeal reflux (believed to be a granuloma). It has not changed significantly, even after starting antireflux medications, and we are concerned that this represents a malignancy.    -Risks and benefits of direct laryngoscopy and biopsy under general anesthesia include injury to the oral, oropharyngeal or laryngeal structures (teeth, lips, tongue, mucous membranes), hemorrhage, dysphagia/odynophagia, voice changes, need for further biopsies outlined. Patient in agreement with plan for procedure, however wishes to know the financial cost of the procedure before he proceeds. We will discuss this with our billing department and notify him of the cost; he has also been instructed to call his insurance company (we have written down the name of the procedure for him).     The patient was counseled at length about the risks of sukhi Covid-19 during their perioperative period and any recovery window from their procedure. The patient was made aware that sukhi Covid-19  may worsen their prognosis for recovering from their procedure  and lend to a higher morbidity and/or mortality risk. All material risks, benefits, and reasonable alternatives including postponing the procedure were discussed. The patient does wish to proceed with the procedure at this time however only after he has a better sense of the financial costs associated with this procedure    Return in about 4 weeks (around 10/22/2020). Maile Ha MD  [facility]  Department of Otolaryngology/Head and Neck Surgery  9/24/20    I have performed a head and neck physical exam personally or was physically present during the key or critical portions of the service. Medical Decision Making: The following items were considered in medical decision making:  Independent review of images  Review / order clinical lab tests  Review / order radiology tests  Decision to obtain old records  Review and summation of old records as accessed through [facility] (a summary of my findings in these old records: None)     Portions of this note were dictated using Dragon.  There may be linguistic errors secondary to the use of this program.

## 2020-10-26 ENCOUNTER — OFFICE VISIT (OUTPATIENT)
Dept: ENT CLINIC | Age: 65
End: 2020-10-26
Payer: COMMERCIAL

## 2020-10-26 VITALS
SYSTOLIC BLOOD PRESSURE: 165 MMHG | TEMPERATURE: 97 F | HEIGHT: 73 IN | WEIGHT: 225.8 LBS | DIASTOLIC BLOOD PRESSURE: 93 MMHG | BODY MASS INDEX: 29.93 KG/M2 | RESPIRATION RATE: 16 BRPM | HEART RATE: 53 BPM

## 2020-10-26 PROCEDURE — 99213 OFFICE O/P EST LOW 20 MIN: CPT | Performed by: OTOLARYNGOLOGY

## 2020-10-26 RX ORDER — IPRATROPIUM BROMIDE 42 UG/1
2 SPRAY, METERED NASAL 4 TIMES DAILY
Qty: 1 BOTTLE | Refills: 3 | Status: SHIPPED | OUTPATIENT
Start: 2020-10-26 | End: 2021-02-11 | Stop reason: ALTCHOICE

## 2020-10-26 NOTE — PROGRESS NOTES
[address] UP VISIT      Patient Name: Kayode [facility] Record Number:  [id number]  Primary Care Physician:  Patsy Sneed MD    ChiefComplaint     Chief Complaint   Patient presents with    Follow-up     Patient states that there has been no change in his symptoms, feels the same       History of Present Illness     Amrit Estrada is an 63 y.o. male history of nasal congestion and chronic cough.     Cough has been ongoing for the past 10 years - is dry, intermittent throughout the day, non-productive, with no defined exacerbating triggers or alleviating factors. He believes his throat is always dry, and occasionally taking a throat lozenge will improve his dry throat and may help his cough. Prior use of lisinopril, however his primary care provider discontinued this 02/2020 and he continues to have cough. Occasional heartburn depending on diet (with spicy foods), but otherwise no hoarseness, dysphagia, odynophagia, dyspnea, hoarseness.     Also with nasal congestion, feels that his nose closes up when supine. No rhinorrhea, post-nasal drip, Has KENDALL, on CPAP machine. Has been on fluticasone for 2 weeks prior to bedtime (believes this helps initially)     Has history of elevated fasting glucose but most recent BMP was normal (glucose 110). Takes carvedilol for HTN. Hemoglobin A1c 5.4 on 08/2020. No history of head and neck imaging, sinus surgery or nasal trauma.     No history of tobacco use, 2 cups of coffee daily, soda 1/week, spicy foods 2-3x/month. Minimal use of water daily. Interval History 9/24/2020: Since last visit he has been using omeprazole 40 mg twice daily as prescribed. Continues to drink 2 cups of coffee daily, 1 soda a week and uses spicy foods. Has increased hydration, although is concerned that this may be leading to him going to the bathroom more and in the middle of the night.     His chronic cough has resolved- no longer states nonproductive, intermittent cough; occasional throat dryness, no water brash, hoarseness or chest burning. He continues to have nasal congestion every night. Goes to bed at approximately 930, and by about 1-1:30 in the morning he typically removes his CPAP as he feels he cannot breathe through his nose. No postnasal drip, anterior rhinorrhea, nasal pain, epistaxis. Has been using fluticasone as prescribed without improvement in symptoms. Interval History 10/26/2020: No improvement in nasal congestion with azelastine and flonase. Continues to have nasal congestion every night several hours after bedtime, has attempted elevating the head of his bed with no improvement.     Past Medical History     Past Medical History:   Diagnosis Date    Elevated fasting glucose     Hyperlipidemia     Hypertension     Insomnia     Sleep apnea        Past Surgical History     Past Surgical History:   Procedure Laterality Date    COLONOSCOPY  11/20/13    [facility]PA[facility]  2006    [facility]    WISDOM TOOTH EXTRACTION         Family History     Family History   Problem Relation Age of Onset    Diabetes Mother     Parkinsonism Father        Social History     Social History     Tobacco Use    Smoking status: Never Smoker    Smokeless tobacco: Never Used   Substance Use Topics    Alcohol use: Yes     Comment: beer every 1-2 weeks    Drug use: No        Allergies     Allergies   Allergen Reactions    Codeine Rash    Penicillins Rash       Medications     Current Outpatient Medications   Medication Sig Dispense Refill    azelastine (ASTELIN) 0.1 % nasal spray 1 spray by Nasal route nightly Use in each nostril as directed 1 Bottle 1    omeprazole (PRILOSEC) 20 MG delayed release capsule Take 1 capsule by mouth 2 times daily (before meals) 120 capsule 1    lisinopril-hydroCHLOROthiazide (PRINZIDE;ZESTORETIC) 20-12.5 MG per tablet TAKE 2 TABLET EVERY MORNING 90 tablet 3    gabapentin mucosa within normal limits, no purulent drainage  EARS: Normal external appearance; on portable otomicroscopy:  -Right ear: External auditory canal without stenosis, tympanic membrane clear, no middle ear effusions or retractions  -Left ear: External auditory canal without stenosis, tympanic membrane clear, no middle ear effusions or retractions  FACE: 1/6 House-Brackmann Scale, symmetric, sensation equal bilaterally  ORAL CAVITY: No masses or lesions palpated, uvula is midline, moist mucous membranes, 1+ tonsils, good dentition  -Dental mirror exam: Base of tongue with no masses, uvula anatomically normal   NECK: Normal range of motion, no thyromegaly, trachea is midline, no lymphadenopathy, no neck masses, no crepitus  CHEST: Normal respiratory effort, no retractions, breathing comfortably  SKIN: No rashes, normal appearing skin, no evidence of skin lesions/tumors  NEURO: CN 2, 3, 4, 5, 6, 7, 11, 12 intact bilaterally     Procedure       Assessment and Plan     1. Laryngopharyngeal reflux (LPR)  Patient with chronic cough which we attributed to laryngopharyngeal reflux. 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 The patient is interested in discontinuing his omeprazole; should chronic cough recur we have asked him to contact our office. 2. Nasal congestion  Patient with nasal congestion, worse in the dependent position- involves over 4-5 hours while he is supine, and eventually he cannot breathe through his nose (even with CPAP in place). Does not improve with Flonase or Astelin, and we will attempt Atrovent    3. Laryngeal mass  On prior examination we appreciated a mass over the left vocal process of the arytenoid which we attributed to laryngopharyngeal reflux (believed to be a granuloma).   It has not changed significantly, even after

## 2020-10-26 NOTE — Clinical Note
Dr. 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 I do not have access to a laser at [facility], is this something you would be able to do at [facility]?   Tan Jolly

## 2020-11-03 ENCOUNTER — VIRTUAL VISIT (OUTPATIENT)
Dept: PULMONOLOGY | Age: 65
End: 2020-11-03
Payer: COMMERCIAL

## 2020-11-03 ENCOUNTER — TELEPHONE (OUTPATIENT)
Dept: PULMONOLOGY | Age: 65
End: 2020-11-03

## 2020-11-03 ENCOUNTER — TELEPHONE (OUTPATIENT)
Dept: ENT CLINIC | Age: 65
End: 2020-11-03

## 2020-11-03 PROCEDURE — 99214 OFFICE O/P EST MOD 30 MIN: CPT | Performed by: INTERNAL MEDICINE

## 2020-11-03 RX ORDER — OMEPRAZOLE 20 MG/1
20 CAPSULE, DELAYED RELEASE ORAL
Qty: 120 CAPSULE | Refills: 1 | Status: SHIPPED | OUTPATIENT
Start: 2020-11-03 | End: 2021-01-14 | Stop reason: ALTCHOICE

## 2020-11-03 RX ORDER — TRAZODONE HYDROCHLORIDE 100 MG/1
100 TABLET ORAL NIGHTLY
Qty: 90 TABLET | Refills: 0 | Status: SHIPPED | OUTPATIENT
Start: 2020-11-03 | End: 2021-02-02

## 2020-11-03 ASSESSMENT — SLEEP AND FATIGUE QUESTIONNAIRES
HOW LIKELY ARE YOU TO NOD OFF OR FALL ASLEEP IN A CAR, WHILE STOPPED FOR A FEW MINUTES IN TRAFFIC: 0
ESS TOTAL SCORE: 2
HOW LIKELY ARE YOU TO NOD OFF OR FALL ASLEEP WHILE LYING DOWN TO REST IN THE AFTERNOON WHEN CIRCUMSTANCES PERMIT: 0
HOW LIKELY ARE YOU TO NOD OFF OR FALL ASLEEP WHILE SITTING AND TALKING TO SOMEONE: 0
HOW LIKELY ARE YOU TO NOD OFF OR FALL ASLEEP WHILE SITTING AND READING: 2
HOW LIKELY ARE YOU TO NOD OFF OR FALL ASLEEP WHILE SITTING INACTIVE IN A PUBLIC PLACE: 0
HOW LIKELY ARE YOU TO NOD OFF OR FALL ASLEEP WHILE WATCHING TV: 0
HOW LIKELY ARE YOU TO NOD OFF OR FALL ASLEEP WHILE SITTING QUIETLY AFTER LUNCH WITHOUT ALCOHOL: 0
HOW LIKELY ARE YOU TO NOD OFF OR FALL ASLEEP WHEN YOU ARE A PASSENGER IN A CAR FOR AN HOUR WITHOUT A BREAK: 0

## 2020-11-03 NOTE — PATIENT INSTRUCTIONS
cocoa and chocolate and is best avoided at bedtime. Nicotine is found in tobacco products. Avoid alcohol 4-6 hours before bedtime. Alcohol has an immediate sleep-inducing effect, after a few hours when alcohol levels fall there is a stimulant or wake-up effect and will cause fragmented sleep. Sleep rituals are important. Give your body clues it is time to slow down and sleep. Examples include; yoga, deep breathing, listen to relaxing music, a hot bath or a few minutes of reading. Have a fixed bedtime and awakening time, Even on weekends! You will feel better keeping a regular sleep cycle, even if you are retired or not working. Get into your favorite sleep position. If not asleep in 30 minutes, get up and do something boring until you feel sleepy. Remember not to expose yourself to bright lights such as TV, phone or tablet screens. Only use your bed for sleeping. Do not use your bed as an office, workroom or recreation room. Use comfortable bedding. Uncomfortable bedding can prevent good sleep. Ensure your bedroom is quiet and comfortable. A cooler room along with enough blankets to stay warm is recommended. If your room is too noisy, try a white noise machine. If too bright, try black out shades or an eye mask. Dont take worries to bed. Leave worries about work, school etc. behind you when you go to bed. Some people find it helpful to assign a worry period in the evening or late afternoon to write down your worries and get them out of your system.    CPAP Equipment Cleaning and Disinfecting Schedule  Equipment Cleaning Frequency Instructions  Disinfecting Frequency   Non-Disposable Filters  Weekly Mild soapy water, Rinse, Air Dry Not Required   Disposable Filters Change as needed  2-4 weeks Do Not Wash Not Required   Hose/tubing Daily Mild soapy water, Rinse, Air Dry Once a week   Mask / Nasal Pillows Daily Mild soapy water, Rinse, Air Dry Once a week   Headgear Weekly Hand wash, Mild soapy water, Rinse, Dry  Not Required   Humidifier Daily Empty water daily  Mild soapy water, Rinse well, Air Dry  Once a week   CPAP Unit As Needed Dust with damp cloth,  No detergents or sprays Not Required         Disinfect (per schedule) with 1 part white vinegar and 3 parts water- soak mask and water chamber for 30 minutes every 1-2 weeks, more often if sick. Allow water/vinegar mixture to run through tubing. Allow all equipment to air dry. Drying Hints:   Always hang tubing away from direct sunlight, as this will cause the tubing to become yellow, brittle and crack over a period of time. DO NOT attach the wet tubing to your CPAP unit to blow-dry it. The moisture from the tubing can drain back into your machine. Moisture in your unit can cause sudden pressure increases or short circuits  DO's and DON'Ts:  - Don't use alcohol-based products to clean your mask, because it can cause the materials to become hard and brittle. - Don't put headgear in the washer or dryer  - Don't use any caustic or household cleaning solutions such as bleach on your CPAP   equipment.  - Do follow the recommended cleaning schedule. - Do change your disposable filter frequently. Adapted From: [email or web address]/cleaning. shtm.   These are general suggestions for all models please follow specific s recommendations and specific instructions

## 2020-11-03 NOTE — PROGRESS NOTES
[facility]                                                          TELEHEALTH EVALUATION: Service performed was Audio/Visual (During [facility]) and not a face-to-face visit         CHIEF COMPLAINT: Follow-up KENDALL        HPI:   Early am congested and takes his machine off- 1-3 am. Cristela Side with no benefit. Atrovent was okay. Astelin now is working the best. Patient is using CPAP 2-3  Hrs/night. Mask and pressure are okay. Uses  Full face mask- works best for him. Using humidifier. Bedtime is 9:30-10 pm and rise time is 5 am. Sleep onset is few minutes. Trazodone is working well. From prior visit:   Chronic insomnia for 10 years and has been on Ambien for 10 years. Mind racing not able to shut it off. Worse since early this year not better with Ambien 10 mg waking up several time/night and making him drowsy. Mild. Not sure what makes better or worse. Associated with sleep fragmentation. Started recently on Trazodone with not much help and went back on Ambien 5 mg. Gets to sleep fine, wakes up after after 3-4 hrs of sleep. Then he wakes every hr after that. He goes to bed 9:30 pm and get up 5. No naps. Sleep onset 15-20 min. Sleeps total 5 hrs/night. Trazodone, could not get a sleep and stay a sleep with it. Tried it for a week. Snoring at night when he is on his back. No witnessed apnea. At times fatigue and tired during the day. No RLS. 2 cups of coffee in am. No TV or electronics in bedroom. Past Medical History:   Diagnosis Date    Elevated fasting glucose     Hyperlipidemia     Hypertension     Insomnia     Sleep apnea        Past Surgical History:        Procedure Laterality Date    COLONOSCOPY  11/20/13    [facility]    WISDOM TOOTH EXTRACTION         Allergies:  is allergic to codeine and penicillins.   Social History:    TOBACCO:   reports that he has never smoked. He has never used smokeless tobacco.  ETOH:   reports current alcohol use. Family History:       Problem Relation Age of Onset    Diabetes Mother     Parkinsonism Father        Current Medications:    Current Outpatient Medications:     ipratropium (ATROVENT) 0.06 % nasal spray, 2 sprays by Each Nostril route 4 times daily, Disp: 1 Bottle, Rfl: 3    azelastine (ASTELIN) 0.1 % nasal spray, 1 spray by Nasal route nightly Use in each nostril as directed, Disp: 1 Bottle, Rfl: 1    omeprazole (PRILOSEC) 20 MG delayed release capsule, Take 1 capsule by mouth 2 times daily (before meals), Disp: 120 capsule, Rfl: 1    lisinopril-hydroCHLOROthiazide (PRINZIDE;ZESTORETIC) 20-12.5 MG per tablet, TAKE 2 TABLET EVERY MORNING, Disp: 90 tablet, Rfl: 3    gabapentin (NEURONTIN) 300 MG capsule, Take 1 capsule by mouth daily. , Disp: 90 capsule, Rfl: 3    atorvastatin (LIPITOR) 40 MG tablet, Take 1 tablet by mouth daily, Disp: 90 tablet, Rfl: 3    fluticasone (FLONASE) 50 MCG/ACT nasal spray, 2 sprays by Nasal route daily, Disp: 1 Bottle, Rfl: 5    traZODone (DESYREL) 50 MG tablet, Take 2 tablets by mouth nightly, Disp: 90 tablet, Rfl: 1    carvedilol (COREG) 6.25 MG tablet, TAKE 1 TABLET BY MOUTH TWICE DAILY, Disp: 180 tablet, Rfl: 1    simvastatin (ZOCOR) 20 MG tablet, TAKE 1 TABLET NIGHTLY, Disp: 90 tablet, Rfl: 1        Objective:   PHYSICAL EXAM:    There were no vitals taken for this visit.' on RA  Mallampati class III  Neck size 17   Constitutional:  No acute distress. Appears well developed and nourished. Eyes: No sclera icterus. EOM intact. No visible discharge. HENT: Head is normocephalic and atraumatic. Mucus membranes are moist and the tongue appears normal. Normal appearing nose. External Ears normal.   Neck: No visualized mass. Noreen Motto is midline   Resp: No accessory muscle use.  Respiratory effort normal. No visualized signs of difficulty breathing or respiratory distress. Cardiovascular: No LE edema. Musculoskeletal: Normal gait with no signs of ataxia. Normal range of motion of the neck. Skin: No significant exanthematous lesions or discoloration noted on facial skin    Neuro: Awake. Alert. Able to follow commands. No facial asymmetry. No gaze palsy. Psych: No agitation. Normal affect. No hallucinations. Oriented to person/time/place. No anxiety. Normal judgement and insight. DATA reviewed by me:   HST 6/15/2020 9.6        CPAP data 07/05-08/03 2020 reviewed by me. Uses 4-5 hrs/night with 67% compliance and AHI of 2.4. median 9.1 and 95% percentile 12.2. APAP 8-16. CPAP data 07/07-08/05 2020 reviewed by me. Uses 4-5 hrs/night with 70% compliance and AHI of 2.1.  median 9.2 and 95% percentile 12.2. APAP 8-16. CPAP data 10/04-11/02 2020 reviewed by me. Uses 2-3 hrs/night with 23% compliance and AHI of 31.2.  median 11.6 and 95% percentile 12.5. APAP 9-13         Assessment:       · Mild KENDALL. Full face mask. APAP 9-13 cmH2O. Poor compliance with high AHI upon review today    · Nasal congestion  · Chronic sleep maintenance and onset insomnia on Ambien 5 mg. Used to be on Ambien 10 mg for 10 years. Psychophysiological hyperarousal. Failed Ambien and Belsomra. Better on Trazodone now   · Essential hypertension and hypercholesterolemia followed by cardiology  · Gabapentin use      Plan:    Change APAP 10-14 cm H2O. Flonase/Azelastine 137 mcg/spray (Astelin) 2 sprays/nostril BID PRN  Compliance in 6 week   Continue CPAP 6-8 hrs at night and during naps. Replacement of mask, tubing, head straps every 3-6 months or sooner if damaged. Follow up CPAP compliance and pressure adjustment if needed  Cognitive behavioral therapy was discussed with patient today including sleep education, sleep restriction, stimulus control, and sleep hygiene. Continue Trazodone 100 mg po QHS- 3 months supplies.  Risks, benefits and side effects were discussed with patient. Avoid sedatives, alcohol and caffeinated drinks at bed time. No driving motorized vehicles or operating heavy machinery while fatigue, drowsy or sleepy. Weight loss is also recommended as a long-term intervention. Follow up in 3-6 months           Mary Jane Cortez is a 63 y.o. male being evaluated by a Virtual Visit (video visit) encounter to address concerns as mentioned above. A caregiver was present when appropriate. Due to this being a TeleHealth encounter (During LNXGG-[id number] public health emergency), evaluation of the following organ systems was limited: Vitals/Constitutional/EENT/Resp/CV/GI//MS/Neuro/Skin/Heme-Lymph-Imm. Pursuant to the emergency declaration under the [address] [place] [address], [address] [place] [address] authority and the [employer] and [employer] Act, this Virtual Visit was conducted with patient's (and/or legal guardian's) consent, to reduce the patient's risk of exposure to COVID-19 and provide necessary medical care. The patient (and/or legal guardian) has also been advised to contact this office for worsening conditions or problems, and seek emergency medical treatment and/or call 911 if deemed necessary. Services were provided through a video synchronous discussion virtually to substitute for in-person clinic visit. Patient was located in her home, provider was located in his office. --Andreas Brennan MD on 11/3/2020 at 2:48 PM    An electronic signature was used to authenticate this note.

## 2020-11-03 NOTE — TELEPHONE ENCOUNTER
Called by patient - will add steroid inhaler (and re-start PPI therapy BID) for continued suspicion of laryngeal granuloma. If no improvement/effect, will plan for direct laryngoscopy/biopsy with further excision (and use of CO2 laser) if malignancy identified. He will follow up in 4 weeks.

## 2020-11-03 NOTE — TELEPHONE ENCOUNTER
Patient called in stating he was supposed to receive a call from Dr. Prince Corea in regards to results before having a procedure done. Patient mentioned he has reached his deductible with his insurance and would like to do this now if possible. Spoke with Dr. Prince Corea who stated he would like to speak with the patient. Transferred the patient to Dr. Prince Corea.

## 2020-12-03 ENCOUNTER — OFFICE VISIT (OUTPATIENT)
Dept: ENT CLINIC | Age: 65
End: 2020-12-03
Payer: COMMERCIAL

## 2020-12-03 VITALS
WEIGHT: 228.2 LBS | DIASTOLIC BLOOD PRESSURE: 83 MMHG | TEMPERATURE: 97.1 F | BODY MASS INDEX: 30.24 KG/M2 | HEART RATE: 54 BPM | SYSTOLIC BLOOD PRESSURE: 141 MMHG | HEIGHT: 73 IN

## 2020-12-03 PROBLEM — R05.3 CHRONIC COUGH: Status: ACTIVE | Noted: 2020-12-03

## 2020-12-03 PROBLEM — R09.81 NASAL CONGESTION: Status: ACTIVE | Noted: 2020-12-03

## 2020-12-03 PROBLEM — J38.7 LARYNGEAL MASS: Status: ACTIVE | Noted: 2020-12-03

## 2020-12-03 PROCEDURE — 31575 DIAGNOSTIC LARYNGOSCOPY: CPT | Performed by: OTOLARYNGOLOGY

## 2020-12-03 PROCEDURE — 99213 OFFICE O/P EST LOW 20 MIN: CPT | Performed by: OTOLARYNGOLOGY

## 2020-12-03 NOTE — PATIENT INSTRUCTIONS
-Continue steroid inhaler twice daily - make sure to rinse mouth out after inhaler use  -Stop anti-reflux medication (omeprazole)   -Phone call visit in 6 weeks

## 2020-12-03 NOTE — PROGRESS NOTES
[address] UP VISIT      Patient Name: [address] [place] [address] Record Number:  [id number]  Primary Care Physician:  Eneida Flores MD    ChiefComplaint     Chief Complaint   Patient presents with    Follow-up     States his symptoms seemed to have improved \"maybe a little\" since last visit. History of Present Illness     Izaiah Reinoso is an 63 y.o. male history of nasal congestion and chronic cough.     Cough has been ongoing for the past 10 years - is dry, intermittent throughout the day, non-productive, with no defined exacerbating triggers or alleviating factors. He believes his throat is always dry, and occasionally taking a throat lozenge will improve his dry throat and may help his cough. Prior use of lisinopril, however his primary care provider discontinued this 02/2020 and he continues to have cough. Occasional heartburn depending on diet (with spicy foods), but otherwise no hoarseness, dysphagia, odynophagia, dyspnea, hoarseness.     Also with nasal congestion, feels that his nose closes up when supine. No rhinorrhea, post-nasal drip, Has KENDALL, on CPAP machine. Has been on fluticasone for 2 weeks prior to bedtime (believes this helps initially)     Has history of elevated fasting glucose but most recent BMP was normal (glucose 110). Takes carvedilol for HTN. Hemoglobin A1c 5.4 on 08/2020. No history of head and neck imaging, sinus surgery or nasal trauma.     No history of tobacco use, 2 cups of coffee daily, soda 1/week, spicy foods 2-3x/month. Minimal use of water daily. Interval History 9/24/2020: Since last visit he has been using omeprazole 40 mg twice daily as prescribed. Continues to drink 2 cups of coffee daily, 1 soda a week and uses spicy foods. Has increased hydration, although is concerned that this may be leading to him going to the bathroom more and in the middle of the night.     His chronic cough has resolved- no longer states nonproductive, intermittent cough; occasional throat dryness, no water brash, hoarseness or chest burning. He continues to have nasal congestion every night. Goes to bed at approximately 930, and by about 1-1:30 in the morning he typically removes his CPAP as he feels he cannot breathe through his nose. No postnasal drip, anterior rhinorrhea, nasal pain, epistaxis. Has been using fluticasone as prescribed without improvement in symptoms. Interval History 10/26/2020: No improvement in nasal congestion with azelastine and flonase. Continues to have nasal congestion every night several hours after bedtime, has attempted elevating the head of his bed with no improvement. Interval History 12/3/2020: Patient has been using the beclomethasone inhaler twice daily during the weekdays, 3 times daily on weekends for the past month. States significant improvement in chronic cough; has also been using antireflux medications (which she states had no improvement). Denies any hoarse voice, dysphagia/odynophagia.     Past Medical History     Past Medical History:   Diagnosis Date    Elevated fasting glucose     Hyperlipidemia     Hypertension     Insomnia     Sleep apnea        Past Surgical History     Past Surgical History:   Procedure Laterality Date    COLONOSCOPY  11/20/13    HERNIA REPAIR  2006    [facility]  2004    WISDOM TOOTH EXTRACTION         Family History     Family History   Problem Relation Age of Onset    Diabetes Mother     Parkinsonism Father        Social History     Social History     Tobacco Use    Smoking status: Never Smoker    Smokeless tobacco: Never Used   Substance Use Topics    Alcohol use: Yes     Comment: beer every 1-2 weeks    Drug use: No        Allergies     Allergies   Allergen Reactions    Codeine Rash    Penicillins Rash       Medications     Current Outpatient Medications   Medication Sig Dispense Refill    traZODone (DESYREL) 100 MG anesthetic was sprayed in the After allowing adequate time for hemostatic effect, the flexible 4 mm laryngoscope was passed via the right nasal passage    Nasal Septum: Left caudal septal deviation and spur, no septal hematoma or perforations noted   Nasal Findings: No active hemorrhage, no nasal masses appreciated   Nasopharynx: Clear, no masses or inflammation  Oropharynx: Bilateral tonsillar tissue absent, no exophytic masses  Base of Tongue: Lingual tonsils within normal limits, vallecula without effacement  Epiglottis: Upright, in normal anatomical position  True Vocal Cord: Anatomically normal, left mass over the vocal process of the arytenoid has decreased in size-suspected granuloma, normal abduction and adduction   False Vocal Cord: normal   Hypopharynx Mucosa: No masses or inflammation of the piriform sinuses or postcricoid area  Arytenoids: Normal mucosa, no dislocation appreciated     * Patient tolerated the procedure well with no complications   * Patient was instructed not to eat for 30 minutes following procedure. * Patient was instructed that they may notice minor bleeding. Attestation:   I was present for the entire viewing, including introduction and removal of the scope. Prior images:            Assessment and Plan     1. Chronic cough  Patient with chronic cough which we attributed to laryngopharyngeal reflux. 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     We will communicate this to his pulmonologist, and we will have him stop antireflux medications; we will continue steroid inhalation for the next 6 weeks and call him at that time. If he has no recurrence of cough, will stop steroid inhaler and have him follow-up in another 6 weeks to determine if he indeed has empiric cough variant asthma. 2. Nasal congestion  Patient with nasal congestion, worse in the dependent position- involves over 4-5 hours while he is supine, and eventually he cannot breathe through his nose (even with CPAP in place). Does not improve with Flonase or Astelin, and we started him on Atrovent. We will discuss improvement at next visit. 3. Laryngeal mass  On prior examination we appreciated a mass over the left vocal process of the arytenoid which we attributed to laryngopharyngeal reflux (believed to be a granuloma). It has decreased in size following antireflux medication and steroid inhalation, and is likely a granuloma. Return in about 6 weeks (around 1/14/2021). Ximena Sanchez MD  [facility]  Department of Otolaryngology/Head and Neck Surgery  12/3/20    I have performed a head and neck physical exam personally or was physically present during the key or critical portions of the service. Medical Decision Making: The following items were considered in medical decision making:  Independent review of images  Review / order clinical lab tests  Review / order radiology tests  Decision to obtain old records  Review and summation of old records as accessed through [facility] (a summary of my findings in these old records: None)     Portions of this note were dictated using Dragon.  There may be linguistic errors secondary to the use of this program.

## 2020-12-15 NOTE — TELEPHONE ENCOUNTER
Called Tisha Lane spoke with [facility] and requested compliance. Watch for correspondence.

## 2020-12-28 RX ORDER — CARVEDILOL 6.25 MG/1
TABLET ORAL
Qty: 180 TABLET | Refills: 1 | Status: SHIPPED | OUTPATIENT
Start: 2020-12-28 | End: 2021-08-31

## 2020-12-28 NOTE — TELEPHONE ENCOUNTER
Pt/pharmacy requesting carvedilol 6.25 mg tab. Pending script sent to [facility].     Last OV 6/19/2020 with Omer Avalos  Last Labs- 8/19/2020

## 2021-01-14 ENCOUNTER — VIRTUAL VISIT (OUTPATIENT)
Dept: ENT CLINIC | Age: 66
End: 2021-01-14
Payer: COMMERCIAL

## 2021-01-14 DIAGNOSIS — J38.7 LARYNGEAL GRANULOMA: ICD-10-CM

## 2021-01-14 DIAGNOSIS — K21.9 LARYNGOPHARYNGEAL REFLUX (LPR): Primary | ICD-10-CM

## 2021-01-14 PROCEDURE — 99213 OFFICE O/P EST LOW 20 MIN: CPT | Performed by: OTOLARYNGOLOGY

## 2021-01-14 RX ORDER — FAMOTIDINE 40 MG/1
20 TABLET, FILM COATED ORAL EVERY EVENING
Qty: 90 TABLET | Refills: 1 | Status: SHIPPED | OUTPATIENT
Start: 2021-01-14 | End: 2021-04-14

## 2021-01-14 NOTE — PROGRESS NOTES
[address]  FOLLOW UP VISIT      Patient Name: Nikhil Kearns1 Bernardo Myers [facility] Record Number:  [id number]  Primary Care Physician: JACE Yusuf CNP    ChiefComplaint     Chief Complaint   Patient presents with    Follow-up     States has been \"fine\" since last visit. States symptoms have been about the same. History of Present Illness     Nakul Liz is an 63 y.o. male history of nasal congestion and chronic cough.     Cough has been ongoing for the past 10 years - is dry, intermittent throughout the day, non-productive, with no defined exacerbating triggers or alleviating factors. He believes his throat is always dry, and occasionally taking a throat lozenge will improve his dry throat and may help his cough. Prior use of lisinopril, however his primary care provider discontinued this 02/2020 and he continues to have cough. Occasional heartburn depending on diet (with spicy foods), but otherwise no hoarseness, dysphagia, odynophagia, dyspnea, hoarseness.     Also with nasal congestion, feels that his nose closes up when supine. No rhinorrhea, post-nasal drip, Has KENDALL, on CPAP machine. Has been on fluticasone for 2 weeks prior to bedtime (believes this helps initially)     Has history of elevated fasting glucose but most recent BMP was normal (glucose 110). Takes carvedilol for HTN. Hemoglobin A1c 5.4 on 08/2020. No history of head and neck imaging, sinus surgery or nasal trauma.     No history of tobacco use, 2 cups of coffee daily, soda 1/week, spicy foods 2-3x/month. Minimal use of water daily. Interval History 9/24/2020: Since last visit he has been using omeprazole 40 mg twice daily as prescribed. Continues to drink 2 cups of coffee daily, 1 soda a week and uses spicy foods. Has increased hydration, although is concerned that this may be leading to him going to the bathroom more and in the middle of the night. His chronic cough has resolved no longer states nonproductive, intermittent cough; occasional throat dryness, no water brash, hoarseness or chest burning. He continues to have nasal congestion every night. Goes to bed at approximately 930, and by about 11:30 in the morning he typically removes his CPAP as he feels he cannot breathe through his nose. No postnasal drip, anterior rhinorrhea, nasal pain, epistaxis. Has been using fluticasone as prescribed without improvement in symptoms. Interval History 10/26/2020: No improvement in nasal congestion with azelastine and flonase. Continues to have nasal congestion every night several hours after bedtime, has attempted elevating the head of his bed with no improvement. Interval History 12/3/2020: Patient has been using the beclomethasone inhaler twice daily during the weekdays, 3 times daily on weekends for the past month. States significant improvement in chronic cough; has also been using antireflux medications (which she states had no improvement). Denies any hoarse voice, dysphagia/odynophagia. Interval History 1/14/2021: Continues to have improvement in chronic cough with beclamethasone inhaler BID - continuing omeprazole as well. Believes that omeprazole has helped nighttime indigestion, however beclamethasone has improved cough. No dysphagia/odynophagia, voice changes.      Past Medical History     Past Medical History:   Diagnosis Date    Elevated fasting glucose     Hyperlipidemia     Hypertension     Insomnia     Sleep apnea        Past Surgical History     Past Surgical History:   Procedure Laterality Date    COLONOSCOPY  11/20/13    [facility]  2006    [facility]  2004    WISDOM TOOTH EXTRACTION         Family History     Family History   Problem Relation Age of Onset    Diabetes Mother     Parkinsonism Father        Social History     Social History     Tobacco Use    Smoking status: Never Smoker  Smokeless tobacco: Never Used   Substance Use Topics    Alcohol use: Yes     Comment: beer every 1-2 weeks    Drug use: No        Allergies     Allergies   Allergen Reactions    Codeine Rash    Penicillins Rash       Medications     Current Outpatient Medications   Medication Sig Dispense Refill    famotidine (PEPCID) 40 MG tablet Take 0.5 tablets by mouth every evening 90 tablet 1    beclomethasone (QVAR) 80 MCG/ACT inhaler Inhale 1 puff into the lungs 2 times daily 1 Inhaler 3    carvedilol (COREG) 6.25 MG tablet TAKE 1 TABLET BY MOUTH TWICE DAILY 180 tablet 1    traZODone (DESYREL) 100 MG tablet Take 1 tablet by mouth nightly 90 tablet 0    ipratropium (ATROVENT) 0.06 % nasal spray 2 sprays by Each Nostril route 4 times daily 1 Bottle 3    azelastine (ASTELIN) 0.1 % nasal spray 1 spray by Nasal route nightly Use in each nostril as directed 1 Bottle 1    lisinopril-hydroCHLOROthiazide (PRINZIDE;ZESTORETIC) 20-12.5 MG per tablet TAKE 2 TABLET EVERY MORNING 90 tablet 3    gabapentin (NEURONTIN) 300 MG capsule Take 1 capsule by mouth daily. 90 capsule 3    atorvastatin (LIPITOR) 40 MG tablet Take 1 tablet by mouth daily 90 tablet 3    fluticasone (FLONASE) 50 MCG/ACT nasal spray 2 sprays by Nasal route daily 1 Bottle 5    simvastatin (ZOCOR) 20 MG tablet TAKE 1 TABLET NIGHTLY 90 tablet 1     No current facility-administered medications for this visit.         Review of Systems     REVIEW OF SYSTEMS  The following systems were reviewed and revealed the following in addition to any already discussed in the HPI:    CONSTITUTIONAL: No weight loss, no fever, no night sweats, no chills  EYES: no vision changes, no blurry vision  EARS: no changes in hearing, no otalgia  NOSE: no epistaxis, no rhinorrhea  RESPIRATORY: No difficulty breathing, no shortness of breath - nasal obstruction in the evening  CV: no chest pain, no peripheral vascular disease  HEME: No coagulation disorder, no bleeding disorder NEURO: No TIA or stroke-like symptoms  SKIN: No new rashes in the head and neck, no recent skin cancers  MOUTH: Delete no new ulcers, no recent teeth infections  GASTROINTESTINAL: No diarrhea, stomach pain  PSYCH: No anxiety, no depression    PhysicalExam     There were no vitals filed for this visit. PHYSICAL EXAM  There were no vitals taken for this visit. No physical examination (telephone visit)    Procedure       Assessment and Plan     1. Chronic cough  Patient with chronic cough which we attributed to laryngopharyngeal reflux. 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 He wishes to continue on both antireflux medication and beclamethasone - we will take him off omeprazole and start him on famotidine 20mg every evening; he has also been instructed to decrease beclamethasone to inhaler daily to see if this will manage his chronic cough at a lower dose. 2. Nasal congestion  Patient with nasal congestion, worse in the dependent position involves over 4-5 hours while he is supine, and eventually he cannot breathe through his nose (even with CPAP in place). Does not improve with Flonase or Astelin, and we started him on Atrovent. He has had no improvement with this. We will consider turbinate reduction after examination at next visit.      3. Laryngeal mass On prior examination we appreciated a mass over the left vocal process of the arytenoid which we attributed to laryngopharyngeal reflux (believed to be a granuloma). It has decreased in size following antireflux medication and steroid inhalation, and is likely a granuloma. Return in about 3 months (around 4/14/2021). Saniya Miranda MD  [facility]  Department of Otolaryngology/Head and Neck Surgery  1/14/21    I have performed a head and neck physical exam personally or was physically present during the key or critical portions of the service. Medical Decision Making: The following items were considered in medical decision making:  Independent review of images  Review / order clinical lab tests  Review / order radiology tests  Decision to obtain old records  Review and summation of old records as accessed through [facility] (a summary of my findings in these old records: None)     Portions of this note were dictated using Dragon.  There may be linguistic errors secondary to the use of this program.

## 2021-02-02 RX ORDER — TRAZODONE HYDROCHLORIDE 100 MG/1
TABLET ORAL
Qty: 90 TABLET | Refills: 0 | Status: SHIPPED | OUTPATIENT
Start: 2021-02-02 | End: 2021-02-11 | Stop reason: SDUPTHER

## 2021-02-11 ENCOUNTER — VIRTUAL VISIT (OUTPATIENT)
Dept: PULMONOLOGY | Age: 66
End: 2021-02-11
Payer: COMMERCIAL

## 2021-02-11 DIAGNOSIS — G47.33 OSA (OBSTRUCTIVE SLEEP APNEA): Primary | ICD-10-CM

## 2021-02-11 DIAGNOSIS — R09.81 NASAL CONGESTION: ICD-10-CM

## 2021-02-11 DIAGNOSIS — F51.04 CHRONIC INSOMNIA: ICD-10-CM

## 2021-02-11 PROCEDURE — 99214 OFFICE O/P EST MOD 30 MIN: CPT | Performed by: INTERNAL MEDICINE

## 2021-02-11 RX ORDER — IPRATROPIUM BROMIDE 42 UG/1
2 SPRAY, METERED NASAL 4 TIMES DAILY
Qty: 1 BOTTLE | Refills: 3 | Status: SHIPPED | OUTPATIENT
Start: 2021-02-11 | End: 2021-06-22

## 2021-02-11 RX ORDER — TRAZODONE HYDROCHLORIDE 100 MG/1
TABLET ORAL
Qty: 90 TABLET | Refills: 0 | Status: SHIPPED | OUTPATIENT
Start: 2021-02-11 | End: 2021-06-21 | Stop reason: SDUPTHER

## 2021-02-11 ASSESSMENT — SLEEP AND FATIGUE QUESTIONNAIRES
HOW LIKELY ARE YOU TO NOD OFF OR FALL ASLEEP WHILE LYING DOWN TO REST IN THE AFTERNOON WHEN CIRCUMSTANCES PERMIT: 0
HOW LIKELY ARE YOU TO NOD OFF OR FALL ASLEEP WHILE SITTING AND READING: 2
HOW LIKELY ARE YOU TO NOD OFF OR FALL ASLEEP WHEN YOU ARE A PASSENGER IN A CAR FOR AN HOUR WITHOUT A BREAK: 0

## 2021-02-11 NOTE — PROGRESS NOTES
[facility]                                           TELEHEALTH EVALUATION: Service performed was Audio/Visual (During XFSGH-13 public health emergency) and not a face-to-face visit       CHIEF COMPLAINT: Follow-up KENDALL        HPI:   Overall doing better. More compliant with CPAP. Feels better when he uses. Sleeps deeper and more refreshed in am. Sleeps 10 pm and gets up 5 pm. Sleep right a way. Nocturia 2 times/week. Using humidifier. ESS 8. Trazodone is working fine- 100 mg with no side effect           From prior visit:   Chronic insomnia for 10 years and has been on Ambien for 10 years. Mind racing not able to shut it off. Worse since early this year not better with Ambien 10 mg waking up several time/night and making him drowsy. Mild. Not sure what makes better or worse. Associated with sleep fragmentation. Started recently on Trazodone with not much help and went back on Ambien 5 mg. Gets to sleep fine, wakes up after after 3-4 hrs of sleep. Then he wakes every hr after that. He goes to bed 9:30 pm and get up 5. No naps. Sleep onset 15-20 min. Sleeps total 5 hrs/night. Trazodone, could not get a sleep and stay a sleep with it. Tried it for a week. Snoring at night when he is on his back. No witnessed apnea. At times fatigue and tired during the day. No RLS. 2 cups of coffee in am. No TV or electronics in bedroom. Past Medical History:   Diagnosis Date    Elevated fasting glucose     Hyperlipidemia     Hypertension     Insomnia     Sleep apnea        Past Surgical History:        Procedure Laterality Date    COLONOSCOPY  11/20/13    HERNIA [facility]PAIR  2006    [facility]  2004    WISDOM TOOTH EXTRACTION         Allergies:  is allergic to codeine and penicillins. Social History:    TOBACCO:   reports that he has never smoked. He has never used smokeless tobacco.  ETOH:   reports current alcohol use.       Family History: Problem Relation Age of Onset    Diabetes Mother     Parkinsonism Father        Current Medications:    Current Outpatient Medications:     traZODone (DESYREL) 100 MG tablet, TAKE 1 TABLET NIGHTLY, Disp: 90 tablet, Rfl: 0    famotidine (PEPCID) 40 MG tablet, Take 0.5 tablets by mouth every evening, Disp: 90 tablet, Rfl: 1    beclomethasone (QVAR) 80 MCG/ACT inhaler, Inhale 1 puff into the lungs 2 times daily, Disp: 1 Inhaler, Rfl: 3    carvedilol (COREG) 6.25 MG tablet, TAKE 1 TABLET BY MOUTH TWICE DAILY, Disp: 180 tablet, Rfl: 1    lisinopril-hydroCHLOROthiazide (PRINZIDE;ZESTORETIC) 20-12.5 MG per tablet, TAKE 2 TABLET EVERY MORNING, Disp: 90 tablet, Rfl: 3    gabapentin (NEURONTIN) 300 MG capsule, Take 1 capsule by mouth daily. , Disp: 90 capsule, Rfl: 3    atorvastatin (LIPITOR) 40 MG tablet, Take 1 tablet by mouth daily, Disp: 90 tablet, Rfl: 3    simvastatin (ZOCOR) 20 MG tablet, TAKE 1 TABLET NIGHTLY, Disp: 90 tablet, Rfl: 1        Objective:   PHYSICAL EXAM:    There were no vitals taken for this visit.' on RA  Telephone visit not able to obtain physical exam                   DATA reviewed by me:   HST 6/15/2020 9.6        CPAP data 07/05-08/03 2020 reviewed by me. Uses 4-5 hrs/night with 67% compliance and AHI of 2.4. median 9.1 and 95% percentile 12.2. APAP 8-16. CPAP data 07/07-08/05 2020 reviewed by me. Uses 4-5 hrs/night with 70% compliance and AHI of 2.1.  median 9.2 and 95% percentile 12.2. APAP 8-16. CPAP data 10/04-11/02 2020 reviewed by me. Uses 2-3 hrs/night with 23% compliance and AHI of 31.2.  median 11.6 and 95% percentile 12.5. APAP 9-13   CPAP data 01/12-02/10 2021 reviewed by me. Uses 5-6 hrs/night with 90% compliance and AHI of 1.3. Median 10.3 and 95% percentile 12.2. APAP 10-14          Assessment:       · Mild KENDALL. Full face mask. APAP 10-14 cmH2O. Optimal compliance and efficacy upon review today.     · Nasal congestion · Chronic sleep maintenance and onset insomnia on Ambien 5 mg. Used to be on Ambien 10 mg for 10 years. Psychophysiological hyperarousal. Failed Ambien and Belsomra. Better on Trazodone now   · Essential hypertension and hypercholesterolemia followed by cardiology  · Gabapentin use      Plan:    Continue CPAP 6-8 hrs at night and during naps. Replacement of mask, tubing, head straps every 3-6 months or sooner if damaged. Trial of Ipratropium nasal spray (0.03%) 2 sprays in each nostril 2-3 times/day PRN  Follow up CPAP compliance and pressure adjustment if needed  Cognitive behavioral therapy was discussed with patient today including sleep education, sleep restriction, stimulus control, and sleep hygiene. Continue Trazodone 100 mg po QHS- 3 months supplies. Risks, benefits and side effects were discussed with patient. Advised to maybe try 1/2 tablet see if it works, goal is wean to lowest effective dose possible   Avoid sedatives, alcohol and caffeinated drinks at bed time. No driving motorized vehicles or operating heavy machinery while fatigue, drowsy or sleepy. Weight loss is also recommended as a long-term intervention.     Follow up in 3-6 months Rafael Nj is a 61 y.o. male being evaluated by a Virtual Visit (video visit) encounter to address concerns as mentioned above. A caregiver was present when appropriate. Due to this being a TeleHealth encounter (During NKYIU-19 public health emergency), evaluation of the following organ systems was limited: Vitals/Constitutional/EENT/Resp/CV/GI//MS/Neuro/Skin/Heme-Lymph-Imm. Pursuant to the emergency declaration under the [address] [place] [address] and the [employer] and [employer] Act, this Virtual Visit was conducted with patient's (and/or legal guardian's) consent, to reduce the patient's risk of exposure to COVID-19 and provide necessary medical care. The patient (and/or legal guardian) has also been advised to contact this office for worsening conditions or problems, and seek emergency medical treatment and/or call 911 if deemed necessary. Services were provided through a video synchronous discussion virtually to substitute for in-person clinic visit. Patient was located in her home, provider was located in his office. --Ida Person MD on 2/11/2021 at 2:40 PM    An electronic signature was used to authenticate this note.

## 2021-02-18 ENCOUNTER — OFFICE VISIT (OUTPATIENT)
Dept: FAMILY MEDICINE CLINIC | Age: 66
End: 2021-02-18
Payer: COMMERCIAL

## 2021-02-18 VITALS
WEIGHT: 231 LBS | HEART RATE: 65 BPM | TEMPERATURE: 98.7 F | BODY MASS INDEX: 30.48 KG/M2 | DIASTOLIC BLOOD PRESSURE: 76 MMHG | SYSTOLIC BLOOD PRESSURE: 128 MMHG | OXYGEN SATURATION: 97 %

## 2021-02-18 DIAGNOSIS — Z00.00 PHYSICAL EXAM: Primary | ICD-10-CM

## 2021-02-18 DIAGNOSIS — E78.2 MIXED HYPERLIPIDEMIA: ICD-10-CM

## 2021-02-18 DIAGNOSIS — G89.29 CHRONIC LEFT SHOULDER PAIN: ICD-10-CM

## 2021-02-18 DIAGNOSIS — M25.512 CHRONIC LEFT SHOULDER PAIN: ICD-10-CM

## 2021-02-18 LAB
A/G RATIO: 2 (ref 1.1–2.2)
ALBUMIN SERPL-MCNC: 4.7 G/DL (ref 3.4–5)
ALP BLD-CCNC: 87 U/L (ref 40–129)
ALT SERPL-CCNC: 42 U/L (ref 10–40)
ANION GAP SERPL CALCULATED.3IONS-SCNC: 11 MMOL/L (ref 3–16)
AST SERPL-CCNC: 24 U/L (ref 15–37)
BASOPHILS ABSOLUTE: 0.1 K/UL (ref 0–0.2)
BASOPHILS RELATIVE PERCENT: 0.8 %
BILIRUB SERPL-MCNC: 0.7 MG/DL (ref 0–1)
BUN BLDV-MCNC: 16 MG/DL (ref 7–20)
CALCIUM SERPL-MCNC: 9.7 MG/DL (ref 8.3–10.6)
CHLORIDE BLD-SCNC: 101 MMOL/L (ref 99–110)
CHOLESTEROL, TOTAL: 117 MG/DL (ref 0–199)
CO2: 29 MMOL/L (ref 21–32)
CREAT SERPL-MCNC: 0.9 MG/DL (ref 0.8–1.3)
EOSINOPHILS ABSOLUTE: 0.4 K/UL (ref 0–0.6)
EOSINOPHILS RELATIVE PERCENT: 6.2 %
GFR AFRICAN AMERICAN: >60
GFR NON-AFRICAN AMERICAN: >60
GLOBULIN: 2.4 G/DL
GLUCOSE BLD-MCNC: 110 MG/DL (ref 70–99)
HCT VFR BLD CALC: 43.9 % (ref 40.5–52.5)
HDLC SERPL-MCNC: 35 MG/DL (ref 40–60)
HEMOGLOBIN: 15.1 G/DL (ref 13.5–17.5)
LDL CHOLESTEROL CALCULATED: 60 MG/DL
LYMPHOCYTES ABSOLUTE: 1.7 K/UL (ref 1–5.1)
LYMPHOCYTES RELATIVE PERCENT: 24.4 %
MCH RBC QN AUTO: 29.7 PG (ref 26–34)
MCHC RBC AUTO-ENTMCNC: 34.5 G/DL (ref 31–36)
MCV RBC AUTO: 86.1 FL (ref 80–100)
MONOCYTES ABSOLUTE: 0.5 K/UL (ref 0–1.3)
MONOCYTES RELATIVE PERCENT: 7.2 %
NEUTROPHILS ABSOLUTE: 4.3 K/UL (ref 1.7–7.7)
NEUTROPHILS RELATIVE PERCENT: 61.4 %
PDW BLD-RTO: 13.6 % (ref 12.4–15.4)
PLATELET # BLD: 234 K/UL (ref 135–450)
PMV BLD AUTO: 8.7 FL (ref 5–10.5)
POTASSIUM SERPL-SCNC: 4.3 MMOL/L (ref 3.5–5.1)
PROSTATE SPECIFIC ANTIGEN: 0.72 NG/ML (ref 0–4)
RBC # BLD: 5.11 M/UL (ref 4.2–5.9)
SODIUM BLD-SCNC: 141 MMOL/L (ref 136–145)
TOTAL PROTEIN: 7.1 G/DL (ref 6.4–8.2)
TRIGL SERPL-MCNC: 112 MG/DL (ref 0–150)
VLDLC SERPL CALC-MCNC: 22 MG/DL
WBC # BLD: 7 K/UL (ref 4–11)

## 2021-02-18 PROCEDURE — 36415 COLL VENOUS BLD VENIPUNCTURE: CPT | Performed by: NURSE PRACTITIONER

## 2021-02-18 PROCEDURE — 99396 PREV VISIT EST AGE 40-64: CPT | Performed by: NURSE PRACTITIONER

## 2021-02-18 RX ORDER — ATORVASTATIN CALCIUM 40 MG/1
40 TABLET, FILM COATED ORAL DAILY
Qty: 90 TABLET | Refills: 3 | Status: SHIPPED | OUTPATIENT
Start: 2021-02-18 | End: 2022-02-14 | Stop reason: SDUPTHER

## 2021-02-18 RX ORDER — GABAPENTIN 300 MG/1
300 CAPSULE ORAL DAILY
Qty: 90 CAPSULE | Refills: 3 | Status: SHIPPED | OUTPATIENT
Start: 2021-02-18 | End: 2021-08-31 | Stop reason: SDUPTHER

## 2021-02-18 ASSESSMENT — ENCOUNTER SYMPTOMS
WHEEZING: 0
CONSTIPATION: 0
ABDOMINAL PAIN: 0
SORE THROAT: 0
SHORTNESS OF BREATH: 0
DIARRHEA: 0

## 2021-02-18 ASSESSMENT — PATIENT HEALTH QUESTIONNAIRE - PHQ9
2. FEELING DOWN, DEPRESSED OR HOPELESS: 0
SUM OF ALL RESPONSES TO PHQ QUESTIONS 1-9: 0
1. LITTLE INTEREST OR PLEASURE IN DOING THINGS: 0

## 2021-02-18 NOTE — PROGRESS NOTES
Patient ID: Rayne Diaz is a 61 y.o. male who presents today for a Physical Exam.      HPI  Here for physical exam   Sleep/pulmonary- Dr. Alanis Dear: insomnia- on trazadone  ENT-Dr. Melgar Hedge: pepcid and QVAR    Past Medical History:   Diagnosis Date    Elevated fasting glucose     Hyperlipidemia     Hypertension     Insomnia     Sleep apnea        Past Surgical History:   Procedure Laterality Date    COLONOSCOPY  11/20/13    [facility]ERNIA [facility]PAIR  2006    [facility]  2004    WISDOM TOOTH EXTRACTION         Family History   Problem Relation Age of Onset    Diabetes Mother     Parkinsonism Father           Social History     Socioeconomic History    Marital status:      Spouse name: None    Number of children: None    Years of education: None    Highest education level: None   Occupational History    None   Social Needs    Financial resource strain: None    Food insecurity     Worry: None     Inability: None    Transportation needs     Medical: None     Non-medical: None   Tobacco Use    Smoking status: Never Smoker    Smokeless tobacco: Never Used   Substance and Sexual Activity    Alcohol use: Yes     Comment: beer every 1-2 weeks    Drug use: No    Sexual activity: Yes     Partners: Female   Lifestyle    Physical activity     Days per week: None     Minutes per session: None    Stress: None   Relationships    Social connections     Talks on phone: None     Gets together: None     Attends Congregation service: None     Active member of club or organization: None     Attends meetings of clubs or organizations: None     Relationship status: None    Intimate partner violence     Fear of current or ex partner: None     Emotionally abused: None     Physically abused: None     Forced sexual activity: None   Other Topics Concern    None   Social History Narrative    None       Allergies   Allergen Reactions    Codeine Rash    Penicillins Rash       Current Outpatient Medications Medication Sig Dispense Refill    gabapentin (NEURONTIN) 300 MG capsule Take 1 capsule by mouth daily. 90 capsule 3    atorvastatin (LIPITOR) 40 MG tablet Take 1 tablet by mouth daily 90 tablet 3    traZODone (DESYREL) 100 MG tablet TAKE 1 TABLET NIGHTLY 90 tablet 0    famotidine (PEPCID) 40 MG tablet Take 0.5 tablets by mouth every evening 90 tablet 1    beclomethasone (QVAR) 80 MCG/ACT inhaler Inhale 1 puff into the lungs 2 times daily 1 Inhaler 3    carvedilol (COREG) 6.25 MG tablet TAKE 1 TABLET BY MOUTH TWICE DAILY 180 tablet 1    lisinopril-hydroCHLOROthiazide (PRINZIDE;ZESTORETIC) 20-12.5 MG per tablet TAKE 2 TABLET EVERY MORNING 90 tablet 3    ipratropium (ATROVENT) 0.06 % nasal spray 2 sprays by Each Nostril route 4 times daily 1 Bottle 3     No current facility-administered medications for this visit. The patient's past medical history, past surgical history, family history, medications, and allergies were all reviewed and updated at appropriate today. Review of Systems   Constitutional: Negative for chills and fever. HENT: Positive for congestion (always-using inhaler- sees ENT). Negative for sore throat. Eyes: Negative for visual disturbance. Respiratory: Negative for shortness of breath and wheezing. Cardiovascular: Negative for chest pain and palpitations. Gastrointestinal: Negative for abdominal pain, constipation and diarrhea. Endocrine: Negative for cold intolerance and heat intolerance. Genitourinary: Negative for dysuria, frequency and urgency. Musculoskeletal: Negative. Skin: Positive for rash (lower legs- was clearing brush last month- is itchy). Allergic/Immunologic: Negative for food allergies. Neurological: Negative for dizziness and headaches. Hematological: Does not bruise/bleed easily. Psychiatric/Behavioral: Positive for sleep disturbance (sees Dr. Ivana Chacon). Negative for dysphoric mood.          Physical Exam - atorvastatin (LIPITOR) 40 MG tablet; Take 1 tablet by mouth daily  Dispense: 90 tablet; Refill: 3    3. Physical exam    - CBC Auto Differential  - Comprehensive Metabolic Panel  - Lipid Panel  - PSA screening              No follow-ups on file.

## 2021-04-14 ENCOUNTER — OFFICE VISIT (OUTPATIENT)
Dept: ENT CLINIC | Age: 66
End: 2021-04-14
Payer: COMMERCIAL

## 2021-04-14 VITALS
HEIGHT: 73 IN | DIASTOLIC BLOOD PRESSURE: 85 MMHG | WEIGHT: 239.4 LBS | TEMPERATURE: 97.2 F | HEART RATE: 56 BPM | SYSTOLIC BLOOD PRESSURE: 149 MMHG | BODY MASS INDEX: 31.73 KG/M2

## 2021-04-14 DIAGNOSIS — J34.2 NASAL SEPTAL DEVIATION: ICD-10-CM

## 2021-04-14 DIAGNOSIS — J34.3 HYPERTROPHY OF INFERIOR NASAL TURBINATE: ICD-10-CM

## 2021-04-14 DIAGNOSIS — J38.7 SUPRAGLOTTIC MASS: ICD-10-CM

## 2021-04-14 DIAGNOSIS — J34.89 CONCHA BULLOSA: ICD-10-CM

## 2021-04-14 DIAGNOSIS — K21.9 LARYNGOPHARYNGEAL REFLUX (LPR): ICD-10-CM

## 2021-04-14 DIAGNOSIS — J34.89 NASAL OBSTRUCTION: Primary | ICD-10-CM

## 2021-04-14 PROCEDURE — 31575 DIAGNOSTIC LARYNGOSCOPY: CPT | Performed by: OTOLARYNGOLOGY

## 2021-04-14 PROCEDURE — 99213 OFFICE O/P EST LOW 20 MIN: CPT | Performed by: OTOLARYNGOLOGY

## 2021-04-14 RX ORDER — OMEPRAZOLE 20 MG/1
20 CAPSULE, DELAYED RELEASE ORAL DAILY
Qty: 120 CAPSULE | Refills: 1 | Status: SHIPPED | OUTPATIENT
Start: 2021-04-14 | End: 2021-10-13

## 2021-04-14 NOTE — PROGRESS NOTES
[address] UP VISIT      Patient Name: Kayode [facility] Record Number:  [id number]  Primary Care Physician:  JACE Geller - D[facility]    ChiefComplaint     Chief Complaint   Patient presents with    Follow-up     Pt states he is here for a 3 month follow up for sore throat and cough. pt states neither has improved. pt states the cough has gotten some what better. pt states his cough is very dry. History of Present Illness     Tai Miller is an 63 y.o. male history of nasal congestion and chronic cough.     Cough has been ongoing for the past 10 years - is dry, intermittent throughout the day, non-productive, with no defined exacerbating triggers or alleviating factors. He believes his throat is always dry, and occasionally taking a throat lozenge will improve his dry throat and may help his cough. Prior use of lisinopril, however his primary care provider discontinued this 02/2020 and he continues to have cough. Occasional heartburn depending on diet (with spicy foods), but otherwise no hoarseness, dysphagia, odynophagia, dyspnea, hoarseness.     Also with nasal congestion, feels that his nose closes up when supine. No rhinorrhea, post-nasal drip, Has KENDALL, on CPAP machine. Has been on fluticasone for 2 weeks prior to bedtime (believes this helps initially)     Has history of elevated fasting glucose but most recent BMP was normal (glucose 110). Takes carvedilol for HTN. Hemoglobin A1c 5.4 on 08/2020. No history of head and neck imaging, sinus surgery or nasal trauma.     No history of tobacco use, 2 cups of coffee daily, soda 1/week, spicy foods 2-3x/month. Minimal use of water daily. Interval History 9/24/2020: Since last visit he has been using omeprazole 40 mg twice daily as prescribed. Continues to drink 2 cups of coffee daily, 1 soda a week and uses spicy foods.   Has increased hydration, although is concerned that this may be leading to him going to the bathroom more and in the middle of the night. His chronic cough has resolved no longer states nonproductive, intermittent cough; occasional throat dryness, no water brash, hoarseness or chest burning. He continues to have nasal congestion every night. Goes to bed at approximately 930, and by about 11:30 in the morning he typically removes his CPAP as he feels he cannot breathe through his nose. No postnasal drip, anterior rhinorrhea, nasal pain, epistaxis. Has been using fluticasone as prescribed without improvement in symptoms. Interval History 10/26/2020: No improvement in nasal congestion with azelastine and flonase. Continues to have nasal congestion every night several hours after bedtime, has attempted elevating the head of his bed with no improvement. Interval History 12/3/2020: Patient has been using the beclomethasone inhaler twice daily during the weekdays, 3 times daily on weekends for the past month. States significant improvement in chronic cough; has also been using antireflux medications (which he states had no improvement). Denies any hoarse voice, dysphagia/odynophagia. Interval History 4/14/2021: Continues to have improvement in chronic cough with beclomethasone inhaler.   However, states significant nasal congestion every night several hours after bedtime, no improvement with ipratropium bromide or head elevation    Past Medical History     Past Medical History:   Diagnosis Date    Elevated fasting glucose     Hyperlipidemia     Hypertension     Insomnia     Sleep apnea        Past Surgical History     Past Surgical History:   Procedure Laterality Date    COLONOSCOPY  11/20/13    [facility]  2006    [facility]    WISDOM TOOTH EXTRACTION         Family History     Family History   Problem Relation Age of Onset    Diabetes Mother     Parkinsonism Father        Social History     Social History     Tobacco Use    Smoking status: Never Smoker    Smokeless tobacco: Never Used   Substance Use Topics    Alcohol use: Not Currently     Comment: beer every 1-2 weeks    Drug use: No        Allergies     Allergies   Allergen Reactions    Codeine Rash    Penicillins Rash       Medications     Current Outpatient Medications   Medication Sig Dispense Refill    gabapentin (NEURONTIN) 300 MG capsule Take 1 capsule by mouth daily. 90 capsule 3    atorvastatin (LIPITOR) 40 MG tablet Take 1 tablet by mouth daily 90 tablet 3    ipratropium (ATROVENT) 0.06 % nasal spray 2 sprays by Each Nostril route 4 times daily 1 Bottle 3    traZODone (DESYREL) 100 MG tablet TAKE 1 TABLET NIGHTLY 90 tablet 0    famotidine (PEPCID) 40 MG tablet Take 0.5 tablets by mouth every evening 90 tablet 1    beclomethasone (QVAR) 80 MCG/ACT inhaler Inhale 1 puff into the lungs 2 times daily 1 Inhaler 3    carvedilol (COREG) 6.25 MG tablet TAKE 1 TABLET BY MOUTH TWICE DAILY 180 tablet 1    lisinopril-hydroCHLOROthiazide (PRINZIDE;ZESTORETIC) 20-12.5 MG per tablet TAKE 2 TABLET EVERY MORNING 90 tablet 3     No current facility-administered medications for this visit.         Review of Systems     REVIEW OF SYSTEMS  The following systems were reviewed and revealed the following in addition to any already discussed in the HPI:    CONSTITUTIONAL: No weight loss, no fever, no night sweats, no chills  EYES: no vision changes, no blurry vision  EARS: no changes in hearing, no otalgia  NOSE: no epistaxis, no rhinorrhea  RESPIRATORY: No difficulty breathing, no shortness of breath  CV: no chest pain, no peripheral vascular disease  HEME: No coagulation disorder, no bleeding disorder  NEURO: No TIA or stroke-like symptoms  SKIN: No new rashes in the head and neck, no recent skin cancers  MOUTH: Delete no new ulcers, no recent teeth infections  GASTROINTESTINAL: No diarrhea, stomach pain  PSYCH: No anxiety, no depression    PhysicalExam     Vitals:    04/14/21 1536 BP: (!) 149/85   Site: Right Upper Arm   Position: Sitting   Cuff Size: Large Adult   Pulse: 56   Temp: 97.2 °F (36.2 °C)   TempSrc: Infrared   Weight: 239 lb 6.4 oz (108.6 kg)   Height: 6' 1\" (1.854 m)       PHYSICAL EXAM  BP (!) 149/85 (Site: Right Upper Arm, Position: Sitting, Cuff Size: Large Adult)   Pulse 56   Temp 97.2 °F (36.2 °C) (Infrared)   Ht 6' 1\" (1.854 m)   Wt 239 lb 6.4 oz (108.6 kg)   BMI 31.59 kg/m²     GENERAL: No Acute Distress, Alert and Oriented, no hoarseness  EYES: EOMI, Anti-icteric  NOSE: On anterior rhinoscopy there is no epistaxis, nasal mucosa within normal limits, no purulent drainage. 3+ bilateral inferior turbinate perjury, no improvement with decongestion. EARS: Normal external appearance; on portable otomicroscopy:  -Right ear: External auditory canal without stenosis, tympanic membrane clear, no middle ear effusions or retractions  -Left ear: External auditory canal without stenosis, tympanic membrane clear, no middle ear effusions or retractions  FACE: 1/6 House-Brackmann Scale, symmetric, sensation equal bilaterally  ORAL CAVITY: No masses or lesions palpated, uvula is midline, moist mucous membranes, 1+ tonsils, good dentition  -Dental mirror exam: Base of tongue with no masses, uvula anatomically normal   NECK: Normal range of motion, no thyromegaly, trachea is midline, no lymphadenopathy, no neck masses, no crepitus  CHEST: Normal respiratory effort, no retractions, breathing comfortably  SKIN: No rashes, normal appearing skin, no evidence of skin lesions/tumors  NEURO: CN 2, 3, 4, 5, 6, 7, 11, 12 intact bilaterally     Procedure     Flexible Laryngoscopy    Pre op: Laryngeal mass, suspect granuloma. Post op: Left laryngeal granuloma  Procedure : Flexible Nasopharyngolaryngoscopy  Surgeon: DANUTA Carnes  Anesthesia: Afrin with 2% lidocaine  Estimated Blood Loss: None    Procedure:   Flexible Laryngoscopy     After obtaining consent, the patient was placed in the examination chair in the upright position. Decongestant and topical anesthetic was sprayed in the After allowing adequate time for hemostatic effect, the flexible 4 mm laryngoscope was passed via the right nasal passage    Nasal Septum: Right caudal septal deviation and spur, bilateral inferior turbinate hypertrophy, left margarita bullosa, no septal hematoma or perforations noted   Nasal Findings: No active hemorrhage, no nasal masses appreciated   Nasopharynx: Clear, no masses or inflammation  Oropharynx: Bilateral tonsillar tissue absent, no exophytic masses  Base of Tongue: Lingual tonsils within normal limits, vallecula without effacement  Epiglottis: Upright, in normal anatomical position  True Vocal Cord: Anatomically normal, left mass over the vocal process of the arytenoid has increased in sizesuspected granuloma, normal abduction and adduction   False Vocal Cord: normal   Hypopharynx Mucosa: No masses or inflammation of the piriform sinuses or postcricoid area  Arytenoids: Normal mucosa, no dislocation appreciated     * Patient tolerated the procedure well with no complications   * Patient was instructed not to eat for 30 minutes following procedure. * Patient was instructed that they may notice minor bleeding. Attestation:   I was present for the entire viewing, including introduction and removal of the scope. Prior images:            Assessment and Plan     1. Chronic cough  Patient with chronic cough which we attributed to laryngopharyngeal reflux. 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   He stated no improvement in cough with antireflux medications, however since starting beclamethasone (which we were prescribing for suspected left vocal cord granuloma) he states significant improvement in coughperhaps his symptoms are benefits of direct laryngoscopy and biopsy under general anesthesia include injury to the oral, oropharyngeal or laryngeal structures (teeth, lips, tongue, mucous membranes), hemorrhage, dysphagia/odynophagia, voice changes, need for further biopsies outlined. Patient in agreement with plan for procedure  -Fire risk with KTP laser use discussed  -General anesthesia carries independent risks which will be discussed with Anesthesiology on day of procedure  -Will have patient see PCP for clearance prior to procedure    The patient was counseled at length about the risks of sukhi Covid-19 during their perioperative period and any recovery window from their procedure. The patient was made aware that sukhi Covid-19  may worsen their prognosis for recovering from their procedure  and lend to a higher morbidity and/or mortality risk. All material risks, benefits, and reasonable alternatives including postponing the procedure were discussed. The patient does wish to proceed with the procedure at this time. No follow-ups on file. Shelley Aponte MD  [facility]  Department of Otolaryngology/Head and Neck Surgery  4/14/21    I have performed a head and neck physical exam personally or was physically present during the key or critical portions of the service. Medical Decision Making: The following items were considered in medical decision making:  Independent review of images  Review / order clinical lab tests  Review / order radiology tests  Decision to obtain old records  Review and summation of old records as accessed through [facility] (a summary of my findings in these old records: None)     Portions of this note were dictated using Dragon.  There may be linguistic errors secondary to the use of this program.

## 2021-05-25 ENCOUNTER — TELEPHONE (OUTPATIENT)
Dept: PULMONOLOGY | Age: 66
End: 2021-05-25

## 2021-05-25 NOTE — TELEPHONE ENCOUNTER
Patient cancelled appointment on 5/27/21 with Dr Dorene Woo for 3-6 mo f/u. Reason: states does not need appt    Patient did not reschedule appointment. Appointment rescheduled for patient states he will call back to carol ann appt. Last OV 2/11/21    Assessment:       · Mild KENDALL. Full face mask. APAP 10-14 cmH2O. Optimal compliance and efficacy upon review today. · Nasal congestion  · Chronic sleep maintenance and onset insomnia on Ambien 5 mg. Used to be on Ambien 10 mg for 10 years. Psychophysiological hyperarousal. Failed Ambien and Belsomra. Better on Trazodone now   · Essential hypertension and hypercholesterolemia followed by cardiology  · Gabapentin use      Plan:    · Continue CPAP 6-8 hrs at night and during naps. · Replacement of mask, tubing, head straps every 3-6 months or sooner if damaged. · Trial of Ipratropium nasal spray (0.03%) 2 sprays in each nostril 2-3 times/day PRN  · Follow up CPAP compliance and pressure adjustment if needed  · Cognitive behavioral therapy was discussed with patient today including sleep education, sleep restriction, stimulus control, and sleep hygiene. · Continue Trazodone 100 mg po QHS- 3 months supplies. Risks, benefits and side effects were discussed with patient. Advised to maybe try 1/2 tablet see if it works, goal is wean to lowest effective dose possible   · Avoid sedatives, alcohol and caffeinated drinks at bed time. · No driving motorized vehicles or operating heavy machinery while fatigue, drowsy or sleepy. · Weight loss is also recommended as a long-term intervention.     · Follow up in 3-6 months

## 2021-06-08 RX ORDER — TRAZODONE HYDROCHLORIDE 100 MG/1
TABLET ORAL
Qty: 90 TABLET | Refills: 3 | OUTPATIENT
Start: 2021-06-08

## 2021-06-21 RX ORDER — TRAZODONE HYDROCHLORIDE 100 MG/1
TABLET ORAL
Qty: 90 TABLET | Refills: 0 | Status: SHIPPED | OUTPATIENT
Start: 2021-06-21 | End: 2021-08-31 | Stop reason: SDUPTHER

## 2021-06-21 NOTE — TELEPHONE ENCOUNTER
----- Message from Chiquita Dang sent at 6/21/2021  9:50 AM EDT -----  Subject: Refill Request    QUESTIONS  Name of Medication? traZODone (DESYREL) 100 MG tablet  Patient-reported dosage and instructions? 100 mg  How many days do you have left? 0  [id number]  Pharmacy phone number (if available)? [phone]  Additional Information for Provider? Patient stated that prescription was   sent to Sleep study doctor and they denied. Patient no longer sees this   doctor. Patient is out of medicine and requesting it to be sent today at   the [place] [address] location stated above.  ---------------------------------------------------------------------------  --------------  [address]  What is the best way for the office to contact you? OK to leave message on   voicemail  Preferred Call Back Phone Number?  [phone]

## 2021-06-22 ENCOUNTER — OFFICE VISIT (OUTPATIENT)
Dept: FAMILY MEDICINE CLINIC | Age: 66
End: 2021-06-22
Payer: COMMERCIAL

## 2021-06-22 VITALS
OXYGEN SATURATION: 96 % | SYSTOLIC BLOOD PRESSURE: 122 MMHG | DIASTOLIC BLOOD PRESSURE: 84 MMHG | WEIGHT: 241 LBS | HEART RATE: 68 BPM | BODY MASS INDEX: 31.8 KG/M2

## 2021-06-22 DIAGNOSIS — R63.5 WEIGHT GAIN: Primary | ICD-10-CM

## 2021-06-22 PROCEDURE — 99213 OFFICE O/P EST LOW 20 MIN: CPT | Performed by: NURSE PRACTITIONER

## 2021-06-22 ASSESSMENT — ENCOUNTER SYMPTOMS
BACK PAIN: 1
WHEEZING: 0
SHORTNESS OF BREATH: 0

## 2021-06-22 NOTE — PROGRESS NOTES
Patient: Pao Garrido is a 61 y.o. male who presents today with the following Chief Complaint(s):  Chief Complaint   Patient presents with    Other     discuss weight gain          HPI     Wants to discuss weight gain: Has gained 25 pounds during [place]. Gym at work has been closed since [place] and does not know when it will reopen. Has cut out snacks and cut down on portions, but still gaining weight. Seldom drinks soda. Has to tried walk a little but not doing as much as he was before. Current Outpatient Medications   Medication Sig Dispense Refill    traZODone (DESYREL) 100 MG tablet TAKE 1 TABLET NIGHTLY 90 tablet 0    omeprazole (PRILOSEC) 20 MG delayed release capsule Take 1 capsule by mouth Daily 120 capsule 1    gabapentin (NEURONTIN) 300 MG capsule Take 1 capsule by mouth daily. 90 capsule 3    atorvastatin (LIPITOR) 40 MG tablet Take 1 tablet by mouth daily 90 tablet 3    beclomethasone (QVAR) 80 MCG/ACT inhaler Inhale 1 puff into the lungs 2 times daily 1 Inhaler 3    carvedilol (COREG) 6.25 MG tablet TAKE 1 TABLET BY MOUTH TWICE DAILY 180 tablet 1    lisinopril-hydroCHLOROthiazide (PRINZIDE;ZESTORETIC) 20-12.5 MG per tablet TAKE 2 TABLET EVERY MORNING 90 tablet 3     No current facility-administered medications for this visit. Patient's past medical history, surgical history, family history, medications,  andallergies  were all reviewed and updated as appropriate today. Review of Systems   Constitutional: Negative for chills and fever. Respiratory: Negative for shortness of breath and wheezing. Cardiovascular: Negative for chest pain and palpitations. Musculoskeletal: Positive for back pain (due to weight gain). Neurological: Negative for dizziness, light-headedness and headaches. Physical Exam  Vitals and nursing note reviewed. Constitutional:       Appearance: Normal appearance. He is well-developed. HENT:      Head: Normocephalic and atraumatic.       Right Ear: External ear normal.      Left Ear: External ear normal.      Nose: Nose normal.   Eyes:      Conjunctiva/sclera: Conjunctivae normal.   Cardiovascular:      Rate and Rhythm: Normal rate and regular rhythm. Heart sounds: Normal heart sounds. No murmur heard. Pulmonary:      Effort: Pulmonary effort is normal. No respiratory distress. Breath sounds: Normal breath sounds. No wheezing or rales. Musculoskeletal:         General: Normal range of motion. Cervical back: Normal range of motion. Lymphadenopathy:      Cervical: No cervical adenopathy. Skin:     General: Skin is warm and dry. Neurological:      General: No focal deficit present. Mental Status: He is alert and oriented to person, place, and time. Deep Tendon Reflexes: Reflexes are normal and symmetric. Psychiatric:         Mood and Affect: Mood normal.         Behavior: Behavior normal.         Thought Content: Thought content normal.         Judgment: Judgment normal.       Vitals:    06/22/21 0700   BP: 122/84   Pulse: 68   SpO2: 96%       Assessment:  Encounter Diagnosis   Name Primary?  Weight gain Yes       Plan:  1. Weight gain  Discussed healthy eating options  Gave information on 1800 dial a dietician through [employer]  Gave handout on healthy eating  Discussed my fitness pal  Discussed increasing water intake to 64 ounces a day           No follow-ups on file.

## 2021-06-24 DIAGNOSIS — I10 ESSENTIAL HYPERTENSION, BENIGN: ICD-10-CM

## 2021-06-24 RX ORDER — CARVEDILOL 6.25 MG/1
TABLET ORAL
Qty: 60 TABLET | Refills: 0 | OUTPATIENT
Start: 2021-06-24

## 2021-06-24 NOTE — TELEPHONE ENCOUNTER
Last OV: 6/10/19  Next OV: f/u in 1 yr 6/2020 needs apt, left vm to call office and schedule apt   Most recent Labs: 2/18/21  Last PT/INR (if needed):  Last EKG (if needed):12/21/18

## 2021-06-28 DIAGNOSIS — I10 ESSENTIAL HYPERTENSION, BENIGN: ICD-10-CM

## 2021-06-28 RX ORDER — CARVEDILOL 6.25 MG/1
TABLET ORAL
Qty: 180 TABLET | Refills: 1 | OUTPATIENT
Start: 2021-06-28

## 2021-07-02 NOTE — TELEPHONE ENCOUNTER
Patient contacted the office in regards to refill on Carvedilol, explained he has not been seen since 2019 and he needs to make an  appointment before getting a refill. NO further questions.

## 2021-08-04 DIAGNOSIS — I10 ESSENTIAL HYPERTENSION, BENIGN: ICD-10-CM

## 2021-08-05 DIAGNOSIS — I10 ESSENTIAL HYPERTENSION, BENIGN: ICD-10-CM

## 2021-08-05 RX ORDER — LISINOPRIL AND HYDROCHLOROTHIAZIDE 20; 12.5 MG/1; MG/1
TABLET ORAL
Qty: 180 TABLET | Refills: 3 | Status: SHIPPED | OUTPATIENT
Start: 2021-08-05 | End: 2022-02-14 | Stop reason: SDUPTHER

## 2021-08-05 RX ORDER — LISINOPRIL AND HYDROCHLOROTHIAZIDE 20; 12.5 MG/1; MG/1
TABLET ORAL
Qty: 60 TABLET | Refills: 0 | Status: SHIPPED | OUTPATIENT
Start: 2021-08-05 | End: 2021-08-05

## 2021-08-31 ENCOUNTER — OFFICE VISIT (OUTPATIENT)
Dept: FAMILY MEDICINE CLINIC | Age: 66
End: 2021-08-31
Payer: COMMERCIAL

## 2021-08-31 VITALS
SYSTOLIC BLOOD PRESSURE: 130 MMHG | HEART RATE: 66 BPM | BODY MASS INDEX: 31.14 KG/M2 | OXYGEN SATURATION: 95 % | DIASTOLIC BLOOD PRESSURE: 86 MMHG | WEIGHT: 236 LBS

## 2021-08-31 DIAGNOSIS — M25.512 CHRONIC LEFT SHOULDER PAIN: ICD-10-CM

## 2021-08-31 DIAGNOSIS — R73.01 ELEVATED FASTING GLUCOSE: ICD-10-CM

## 2021-08-31 DIAGNOSIS — I10 ESSENTIAL HYPERTENSION: Primary | ICD-10-CM

## 2021-08-31 DIAGNOSIS — G89.29 CHRONIC LEFT SHOULDER PAIN: ICD-10-CM

## 2021-08-31 LAB — HBA1C MFR BLD: 5.4 %

## 2021-08-31 PROCEDURE — 99214 OFFICE O/P EST MOD 30 MIN: CPT | Performed by: NURSE PRACTITIONER

## 2021-08-31 PROCEDURE — 83036 HEMOGLOBIN GLYCOSYLATED A1C: CPT | Performed by: NURSE PRACTITIONER

## 2021-08-31 RX ORDER — GABAPENTIN 300 MG/1
300 CAPSULE ORAL DAILY
Qty: 90 CAPSULE | Refills: 3 | Status: SHIPPED | OUTPATIENT
Start: 2021-08-31 | End: 2022-02-14 | Stop reason: SDUPTHER

## 2021-08-31 RX ORDER — TRAZODONE HYDROCHLORIDE 100 MG/1
TABLET ORAL
Qty: 90 TABLET | Refills: 3 | Status: SHIPPED | OUTPATIENT
Start: 2021-08-31 | End: 2022-02-14 | Stop reason: SDUPTHER

## 2021-08-31 ASSESSMENT — ENCOUNTER SYMPTOMS
SHORTNESS OF BREATH: 0
TROUBLE SWALLOWING: 1
SORE THROAT: 0
WHEEZING: 0

## 2021-08-31 NOTE — PROGRESS NOTES
or posterior oropharyngeal erythema. Eyes:      Conjunctiva/sclera: Conjunctivae normal.   Cardiovascular:      Rate and Rhythm: Normal rate and regular rhythm. Heart sounds: Normal heart sounds. No murmur heard. Pulmonary:      Effort: Pulmonary effort is normal. No respiratory distress. Breath sounds: Normal breath sounds. No wheezing or rales. Musculoskeletal:         General: Normal range of motion. Cervical back: Normal range of motion. Lymphadenopathy:      Cervical: No cervical adenopathy. Skin:     General: Skin is warm and dry. Neurological:      General: No focal deficit present. Mental Status: He is alert and oriented to person, place, and time. Deep Tendon Reflexes: Reflexes are normal and symmetric. Psychiatric:         Mood and Affect: Mood normal.         Behavior: Behavior normal.         Thought Content: Thought content normal.         Judgment: Judgment normal.       Vitals:    08/31/21 0738   BP: 130/86   Pulse: 66   SpO2: 95%       Assessment:  Encounter Diagnoses   Name Primary?  Essential hypertension Yes    Elevated fasting glucose     Chronic left shoulder pain        Plan:  1. Essential hypertension  Stable- continue current med  Will do labs at physical again in 6 months  Labs were stable 6 months ago    2. Elevated fasting glucose  Stable no meds needed at this time  - POCT glycosylated hemoglobin (Hb A1C) 5.4    3. Chronic left shoulder pain  Stable- continue med  - gabapentin (NEURONTIN) 300 MG capsule; Take 1 capsule by mouth daily. Dispense: 90 capsule; Refill: 3        No follow-ups on file.

## 2021-09-29 ENCOUNTER — TELEPHONE (OUTPATIENT)
Dept: ENT CLINIC | Age: 66
End: 2021-09-29

## 2021-09-29 NOTE — TELEPHONE ENCOUNTER
Patient wants to move forward with ENT surgery he previously cancelled. Also wants to be sure you address the growth on his jaw. Do you want to see him again before scheduling?     [phone]

## 2021-09-30 NOTE — PROGRESS NOTES
Ramsey Nicole    Age 61 y.o.    male    1955    MRN [id number]    10/15/2021  Arrival Time_____________  OR Time____________265 Min     Procedure(s):  BILATERAL INFERIOR TURBINATE REDUCTION, BILATERAL TOTAL ETHMOIDECTOMY WITH SPHENOIDECTOMY, DIRECT LARYNGOSCOPY WITH BIOPSY AND  SEPTOPLASTY                      General     Surgeon(s):  Lashawn Granda, MD      DAY ADMIT ___  SDS/OP ___  OUTPT IN BED ___         Phone [phone] (home)    PCP _____________________ Phone_________________ Epic ( ) Epic CE ( ) Appt ________    ADDITIONAL INFO __________________________________ Cardio/Consult _____________    NOTES _____________________________________________________________________    ____________________________________________________________________________    PAT APPT DATE:________ TIME: ________  FAXED QAD: _______  (__) H&P w/ hospitalist  ____________________________________________________________________________    COVID TEST: Date/Location______________        NURSING HISTORY COMPLETE: _______  (__) CBC       (__) W/ DIFF ___________  (__)  ECHO    __________  (__) Hgb A1C    ___________  (__) CHEST X RAY   __________  (__) LIPID PROFILE  ___________  (__) EKG   __________  (__) PT/PTT   ___________  (__) PFT's   __________  (__) BMP   ___________  (__) CAROTIDS  __________  (__) CMP   ___________  (__) VEIN MAPPING  __________  (__) U/A   ___________  (__) HISTORY & PHYSICAL __________  (__) URINE C & S  ___________  (__) CARDIAC CLEARANCE __________  (__) U/A W/ FLEX  ___________  (__) PULM.  CLEARANCE __________  (__) SERUM PREGNANCY ___________  (__) Check Epic DOS orders __________  (__) TYPE & SCREEN ________ repeat ( ) (__)  __________________ __________  (__) ALBUMIN   ___________  (__)  __________________ __________  (__) TRANSFERRIN  ___________  (__)  __________________ __________  (__) LIVER PROFILE  ___________  (__)  __________________ __________  (__) CARBOXY HGB  ___________  (__) URINE PREG DOS __________  (__) NICOTINE & MET.  ___________  (__) BLOOD SUGAR DOS __________  (__) PREALBUMIN  ___________    (__) MRSA NASAL SWAB ___________  (__) BLOOD THINNERS __________  (__) ACE/ ARBS: _____________________    (__) BETABLOCKERS ___________________

## 2021-10-01 ENCOUNTER — TELEPHONE (OUTPATIENT)
Dept: FAMILY MEDICINE CLINIC | Age: 66
End: 2021-10-01

## 2021-10-01 NOTE — TELEPHONE ENCOUNTER
----- Message from [place] [facility] [place] sent at 10/1/2021  3:03 PM EDT -----  Subject: Message to Provider    QUESTIONS  Information for Provider? Pt wants to knw if he can have orders for a   covid test prior to his pre op on 10/15. states he needs it done in order   to have surgery clearance, please advise next step.   ---------------------------------------------------------------------------  --------------  CALL BACK INFO  What is the best way for the office to contact you? Do not leave any   message, patient will call back for answer  Preferred Call Back Phone Number? [phone]  ---------------------------------------------------------------------------  --------------  SCRIPT ANSWERS  Relationship to Patient?  Self

## 2021-10-11 ENCOUNTER — OFFICE VISIT (OUTPATIENT)
Dept: FAMILY MEDICINE CLINIC | Age: 66
End: 2021-10-11
Payer: COMMERCIAL

## 2021-10-11 ENCOUNTER — ANESTHESIA EVENT (OUTPATIENT)
Dept: OPERATING ROOM | Age: 66
End: 2021-10-11
Payer: COMMERCIAL

## 2021-10-11 ENCOUNTER — HOSPITAL ENCOUNTER (OUTPATIENT)
Age: 66
Discharge: HOME | End: 2021-10-11
Payer: COMMERCIAL

## 2021-10-11 VITALS
SYSTOLIC BLOOD PRESSURE: 132 MMHG | OXYGEN SATURATION: 96 % | WEIGHT: 226 LBS | DIASTOLIC BLOOD PRESSURE: 88 MMHG | HEART RATE: 76 BPM | BODY MASS INDEX: 29.82 KG/M2

## 2021-10-11 DIAGNOSIS — J34.2 NASAL SEPTAL DEVIATION: ICD-10-CM

## 2021-10-11 DIAGNOSIS — J38.7 SUPRAGLOTTIC MASS: ICD-10-CM

## 2021-10-11 DIAGNOSIS — Z01.818 PRE-OP EVALUATION: Primary | ICD-10-CM

## 2021-10-11 PROCEDURE — 90471 IMMUNIZATION ADMIN: CPT | Performed by: NURSE PRACTITIONER

## 2021-10-11 PROCEDURE — U0005 INFEC AGEN DETEC AMPLI PROBE: HCPCS

## 2021-10-11 PROCEDURE — 99213 OFFICE O/P EST LOW 20 MIN: CPT | Performed by: NURSE PRACTITIONER

## 2021-10-11 PROCEDURE — U0003 INFECTIOUS AGENT DETECTION BY NUCLEIC ACID (DNA OR RNA); SEVERE ACUTE RESPIRATORY SYNDROME CORONAVIRUS 2 (SARS-COV-2) (CORONAVIRUS DISEASE [COVID-19]), AMPLIFIED PROBE TECHNIQUE, MAKING USE OF HIGH THROUGHPUT TECHNOLOGIES AS DESCRIBED BY CMS-2020-01-R: HCPCS

## 2021-10-11 PROCEDURE — 93000 ELECTROCARDIOGRAM COMPLETE: CPT | Performed by: NURSE PRACTITIONER

## 2021-10-11 PROCEDURE — 90674 CCIIV4 VAC NO PRSV 0.5 ML IM: CPT | Performed by: NURSE PRACTITIONER

## 2021-10-11 NOTE — PROGRESS NOTES
[facility]  [phone]  Fax: [phone]   Pre-operative History and Physical      DIAGNOSIS:  Chronic cough, nasal congestion, septal deviation, inferior turbinate hypertrophy, supraglottic mass    PROCEDURE:  Bilateral inferior turbinate reduction, bilateral total ethmoidectomy with sphenoidectomy, direct laryngoscopy with biopsy and septoplasty          History Obtained From:  patient    HISTORY OF PRESENT ILLNESS:    The patient is a 61 y.o. male with significant past medical history of Chronic cough, nasal congestion, septal deviation, inferior turbinate hypertrophy, supraglottic mass . I am seeing this patient for preop consultation for Dr. Fozia Cody       Past Medical History:   Diagnosis Date    Deviated septum     Elevated fasting glucose     Hyperlipidemia     Hypertension     Insomnia     Sleep apnea     Supraglottic mass      Past Surgical History:   Procedure Laterality Date    COLONOSCOPY  11/20/13    [facility]    WISDOM TOOTH EXTRACTION       Current Outpatient Medications   Medication Sig Dispense Refill    gabapentin (NEURONTIN) 300 MG capsule Take 1 capsule by mouth daily. 90 capsule 3    traZODone (DESYREL) 100 MG tablet TAKE 1 TABLET NIGHTLY 90 tablet 3    lisinopril-hydroCHLOROthiazide (PRINZIDE;ZESTORETIC) 20-12.5 MG per tablet TAKE 2 TABLETS BY MOUTH EVERY MORNING 180 tablet 3    omeprazole (PRILOSEC) 20 MG delayed release capsule Take 1 capsule by mouth Daily 120 capsule 1    atorvastatin (LIPITOR) 40 MG tablet Take 1 tablet by mouth daily 90 tablet 3    beclomethasone (QVAR) 80 MCG/ACT inhaler Inhale 1 puff into the lungs 2 times daily 1 Inhaler 3     No current facility-administered medications for this visit.          Allergies:  Codeine and Penicillins  History of allergic reaction to anesthesia:  No     Social History     Tobacco Use   Smoking Status Never Smoker   Smokeless Tobacco Never Used     The patient states he drinks 0 per week. Family History   Problem Relation Age of Onset    Diabetes Mother     Parkinsonism Father        REVIEW OF SYSTEMS:    CONSTITUTIONAL:  negative  EYES:  negative  HEENT:  negative  RESPIRATORY:  negative  CARDIOVASCULAR:  negative  GASTROINTESTINAL:  negative  GENITOURINARY:  negative  INTEGUMENT/BREAST:  negative  HEMATOLOGIC/LYMPHATIC:  negative  ALLERGIC/IMMUNOLOGIC:  positive for drug reactions  ENDOCRINE:  negative  MUSCULOSKELETAL:  negative  NEUROLOGICAL:  negative    PHYSICAL EXAM:      /88   Pulse 76   Wt 226 lb (102.5 kg)   SpO2 96%   BMI 29.82 kg/m²     CONSTITUTIONAL:  awake, alert, cooperative, no apparent distress, and appears stated age    Eyes:  Lids and lashes normal, pupils equal, round and reactive to light, extra ocular muscles intact, sclera clear, conjunctiva normal    Head/ENT:  Normocephalic, without obvious abnormality, atramatic, sinuses nontender on palpation, external ears without lesions, oral pharynx with moist mucus membranes, tonsils without erythema or exudates, gums normal and good dentition. Neck:  Supple, symmetrical, trachea midline, no adenopathy, thyroid symmetric, not enlarged and no tenderness, skin normal    Heart:  Normal apical impulse, regular rate and rhythm, normal S1 and S2, no S3 or S4, and no murmur noted    Lungs:  No increased work of breathing, good air exchange, clear to auscultation bilaterally, no crackles or wheezing    Abdomen:  No scars, normal bowel sounds, soft, non-distended, non-tender, no masses palpated, no hepatosplenomegally    Extremities:  No clubbing, cyanosis, or edema    NEUROLOGIC:  Awake, alert, oriented to name, place and time. Cranial nerves II-XII are grossly intact. Motor is 5 out of 5 bilaterally. DATA:  EKG:  Date:  10/11/2021  I have reviewed EKG with the following interpretation:NSR  Impression:  normal      ASSESSMENT AND PLAN:    1.   Patient is a 61 y.o. male with above specified procedure planned on 10/15/2021 with Dr. Arlene Mejia at [facility]. They will not require cardiology evaluation prior to procedure. 2. Stop ASA/NSAIDS  medications 7-10 days prior to procedure. 3.Patient is cleared for surgery  4. Preop has been faxed to [facility]   office    Ankur Cramer, [address]  [place] [address] 26[address] [facility], [address] [place] [address]  [phone]

## 2021-10-12 DIAGNOSIS — K21.9 LARYNGOPHARYNGEAL REFLUX (LPR): ICD-10-CM

## 2021-10-12 LAB — SARS-COV-2: NOT DETECTED

## 2021-10-13 RX ORDER — OMEPRAZOLE 20 MG/1
CAPSULE, DELAYED RELEASE ORAL
Qty: 120 CAPSULE | Refills: 3 | Status: SHIPPED | OUTPATIENT
Start: 2021-10-13 | End: 2021-11-22

## 2021-10-13 NOTE — PROGRESS NOTES
Preoperative Screening for Elective Surgery/Invasive Procedures While COVID-19 present in the community     Have you had any of the following symptoms? NONE  o Fever, chills  o Cough  o Shortness of breath  o Muscle aches/pain  o Diarrhea  o Abdominal pain, nausea, vomiting  o Loss or decrease in taste and / or smell   Risk of Exposure  o Have you recently been hospitalized for COVID-19 or flu-like illness, if so when? NO  o Recently diagnosed with COVID-19, if so when? NO  o Recently tested for COVID-19, if so when? YES, 10/11/21 - NOT DETECTED FOR PREOP  o Have you been in close contact with a person or family member who currently has or recently had COVID-23? If yes, when and in what context? NO  o Do you live with anybody who in the last 14 days has had fever, chills, shortness of breath, muscle aches, flu-like illness? NO  o Do you have any close contacts or family members who are currently in the hospital for COVID-19 or flu-like illness? If yes, assess recent close contact with this person. NO    Indicate if the patient has a positive screen by answering yes to one or more of the above questions. Patients who test positive or screen positive prior to surgery or on the day of surgery should be evaluated in conjunction with the surgeon/proceduralist/anesthesiologist to determine the urgency of the procedure.

## 2021-10-15 ENCOUNTER — ANESTHESIA (OUTPATIENT)
Dept: OPERATING ROOM | Age: 66
End: 2021-10-15
Payer: COMMERCIAL

## 2021-10-15 ENCOUNTER — HOSPITAL ENCOUNTER (OUTPATIENT)
Age: 66
Setting detail: OUTPATIENT SURGERY
Discharge: HOME | End: 2021-10-15
Attending: OTOLARYNGOLOGY | Admitting: OTOLARYNGOLOGY
Payer: COMMERCIAL

## 2021-10-15 VITALS
SYSTOLIC BLOOD PRESSURE: 150 MMHG | WEIGHT: 234.1 LBS | BODY MASS INDEX: 31.03 KG/M2 | TEMPERATURE: 98.2 F | OXYGEN SATURATION: 92 % | DIASTOLIC BLOOD PRESSURE: 82 MMHG | HEART RATE: 65 BPM | HEIGHT: 73 IN | RESPIRATION RATE: 10 BRPM

## 2021-10-15 VITALS
OXYGEN SATURATION: 100 % | DIASTOLIC BLOOD PRESSURE: 97 MMHG | SYSTOLIC BLOOD PRESSURE: 138 MMHG | RESPIRATION RATE: 7 BRPM | TEMPERATURE: 94.5 F

## 2021-10-15 DIAGNOSIS — Z98.890 S/P NASAL SEPTOPLASTY: Primary | ICD-10-CM

## 2021-10-15 DIAGNOSIS — R05.3 CHRONIC COUGH: ICD-10-CM

## 2021-10-15 DIAGNOSIS — J34.89 NASAL OBSTRUCTION: ICD-10-CM

## 2021-10-15 DIAGNOSIS — J38.7 LARYNGEAL MASS: ICD-10-CM

## 2021-10-15 LAB
ANION GAP SERPL CALCULATED.3IONS-SCNC: 9 MMOL/L (ref 3–16)
BUN BLDV-MCNC: 12 MG/DL (ref 7–20)
CALCIUM SERPL-MCNC: 9.2 MG/DL (ref 8.3–10.6)
CHLORIDE BLD-SCNC: 105 MMOL/L (ref 99–110)
CO2: 28 MMOL/L (ref 21–32)
CREAT SERPL-MCNC: 1 MG/DL (ref 0.8–1.3)
GFR AFRICAN AMERICAN: >60
GFR NON-AFRICAN AMERICAN: >60
GLUCOSE BLD-MCNC: 126 MG/DL (ref 70–99)
HCT VFR BLD CALC: 41.2 % (ref 40.5–52.5)
HEMOGLOBIN: 14.5 G/DL (ref 13.5–17.5)
MCH RBC QN AUTO: 29.6 PG (ref 26–34)
MCHC RBC AUTO-ENTMCNC: 35.1 G/DL (ref 31–36)
MCV RBC AUTO: 84.2 FL (ref 80–100)
PDW BLD-RTO: 13.5 % (ref 12.4–15.4)
PLATELET # BLD: 180 K/UL (ref 135–450)
PLATELET SLIDE REVIEW: ADEQUATE
PMV BLD AUTO: 7.5 FL (ref 5–10.5)
POTASSIUM REFLEX MAGNESIUM: 4.1 MMOL/L (ref 3.5–5.1)
RBC # BLD: 4.89 M/UL (ref 4.2–5.9)
SLIDE REVIEW: NORMAL
SODIUM BLD-SCNC: 142 MMOL/L (ref 136–145)
WBC # BLD: 6.4 K/UL (ref 4–11)

## 2021-10-15 PROCEDURE — 6370000000 HC RX 637 (ALT 250 FOR IP): Performed by: OTOLARYNGOLOGY

## 2021-10-15 PROCEDURE — 2709999900 HC NON-CHARGEABLE SUPPLY: Performed by: OTOLARYNGOLOGY

## 2021-10-15 PROCEDURE — 2720000010 HC SURG SUPPLY STERILE: Performed by: OTOLARYNGOLOGY

## 2021-10-15 PROCEDURE — 7100000001 HC PACU RECOVERY - ADDTL 15 MIN: Performed by: OTOLARYNGOLOGY

## 2021-10-15 PROCEDURE — 88331 PATH CONSLTJ SURG 1 BLK 1SPC: CPT

## 2021-10-15 PROCEDURE — 85027 COMPLETE CBC AUTOMATED: CPT

## 2021-10-15 PROCEDURE — 6360000002 HC RX W HCPCS: Performed by: NURSE ANESTHETIST, CERTIFIED REGISTERED

## 2021-10-15 PROCEDURE — 3700000000 HC ANESTHESIA ATTENDED CARE: Performed by: OTOLARYNGOLOGY

## 2021-10-15 PROCEDURE — 7100000011 HC PHASE II RECOVERY - ADDTL 15 MIN: Performed by: OTOLARYNGOLOGY

## 2021-10-15 PROCEDURE — 80048 BASIC METABOLIC PNL TOTAL CA: CPT

## 2021-10-15 PROCEDURE — 3600000014 HC SURGERY LEVEL 4 ADDTL 15MIN: Performed by: OTOLARYNGOLOGY

## 2021-10-15 PROCEDURE — 30520 REPAIR OF NASAL SEPTUM: CPT | Performed by: OTOLARYNGOLOGY

## 2021-10-15 PROCEDURE — 30140 RESECT INFERIOR TURBINATE: CPT | Performed by: OTOLARYNGOLOGY

## 2021-10-15 PROCEDURE — 7100000010 HC PHASE II RECOVERY - FIRST 15 MIN: Performed by: OTOLARYNGOLOGY

## 2021-10-15 PROCEDURE — 31536 LARYNGOSCOPY W/BX & OP SCOPE: CPT | Performed by: OTOLARYNGOLOGY

## 2021-10-15 PROCEDURE — 2500000003 HC RX 250 WO HCPCS: Performed by: NURSE ANESTHETIST, CERTIFIED REGISTERED

## 2021-10-15 PROCEDURE — 3700000001 HC ADD 15 MINUTES (ANESTHESIA): Performed by: OTOLARYNGOLOGY

## 2021-10-15 PROCEDURE — 2500000003 HC RX 250 WO HCPCS: Performed by: ANESTHESIOLOGY

## 2021-10-15 PROCEDURE — 2500000003 HC RX 250 WO HCPCS: Performed by: OTOLARYNGOLOGY

## 2021-10-15 PROCEDURE — 7100000000 HC PACU RECOVERY - FIRST 15 MIN: Performed by: OTOLARYNGOLOGY

## 2021-10-15 PROCEDURE — 2580000003 HC RX 258: Performed by: NURSE ANESTHETIST, CERTIFIED REGISTERED

## 2021-10-15 PROCEDURE — 2580000003 HC RX 258: Performed by: ANESTHESIOLOGY

## 2021-10-15 PROCEDURE — 3600000004 HC SURGERY LEVEL 4 BASE: Performed by: OTOLARYNGOLOGY

## 2021-10-15 PROCEDURE — 88305 TISSUE EXAM BY PATHOLOGIST: CPT

## 2021-10-15 RX ORDER — ONDANSETRON 2 MG/ML
4 INJECTION INTRAMUSCULAR; INTRAVENOUS
Status: DISCONTINUED | OUTPATIENT
Start: 2021-10-15 | End: 2021-10-15 | Stop reason: HOSPADM

## 2021-10-15 RX ORDER — FENTANYL CITRATE 50 UG/ML
INJECTION, SOLUTION INTRAMUSCULAR; INTRAVENOUS PRN
Status: DISCONTINUED | OUTPATIENT
Start: 2021-10-15 | End: 2021-10-15 | Stop reason: SDUPTHER

## 2021-10-15 RX ORDER — DIPHENHYDRAMINE HYDROCHLORIDE 50 MG/ML
12.5 INJECTION INTRAMUSCULAR; INTRAVENOUS
Status: DISCONTINUED | OUTPATIENT
Start: 2021-10-15 | End: 2021-10-15 | Stop reason: HOSPADM

## 2021-10-15 RX ORDER — LIDOCAINE HYDROCHLORIDE 10 MG/ML
INJECTION, SOLUTION INFILTRATION; PERINEURAL
Status: DISCONTINUED
Start: 2021-10-15 | End: 2021-10-15 | Stop reason: HOSPADM

## 2021-10-15 RX ORDER — HYDROMORPHONE HCL 110MG/55ML
PATIENT CONTROLLED ANALGESIA SYRINGE INTRAVENOUS PRN
Status: DISCONTINUED | OUTPATIENT
Start: 2021-10-15 | End: 2021-10-15 | Stop reason: SDUPTHER

## 2021-10-15 RX ORDER — OXYCODONE HYDROCHLORIDE AND ACETAMINOPHEN 5; 325 MG/1; MG/1
1 TABLET ORAL PRN
Status: DISCONTINUED | OUTPATIENT
Start: 2021-10-15 | End: 2021-10-15 | Stop reason: HOSPADM

## 2021-10-15 RX ORDER — SODIUM CHLORIDE, SODIUM LACTATE, POTASSIUM CHLORIDE, CALCIUM CHLORIDE 600; 310; 30; 20 MG/100ML; MG/100ML; MG/100ML; MG/100ML
INJECTION, SOLUTION INTRAVENOUS CONTINUOUS PRN
Status: DISCONTINUED | OUTPATIENT
Start: 2021-10-15 | End: 2021-10-15 | Stop reason: SDUPTHER

## 2021-10-15 RX ORDER — DEXAMETHASONE SODIUM PHOSPHATE 4 MG/ML
INJECTION, SOLUTION INTRA-ARTICULAR; INTRALESIONAL; INTRAMUSCULAR; INTRAVENOUS; SOFT TISSUE PRN
Status: DISCONTINUED | OUTPATIENT
Start: 2021-10-15 | End: 2021-10-15 | Stop reason: SDUPTHER

## 2021-10-15 RX ORDER — CLINDAMYCIN HYDROCHLORIDE 300 MG/1
300 CAPSULE ORAL 2 TIMES DAILY
Qty: 8 CAPSULE | Refills: 0 | Status: SHIPPED | OUTPATIENT
Start: 2021-10-15 | End: 2021-10-19

## 2021-10-15 RX ORDER — LABETALOL HYDROCHLORIDE 5 MG/ML
INJECTION, SOLUTION INTRAVENOUS PRN
Status: DISCONTINUED | OUTPATIENT
Start: 2021-10-15 | End: 2021-10-15 | Stop reason: SDUPTHER

## 2021-10-15 RX ORDER — ROCURONIUM BROMIDE 10 MG/ML
INJECTION, SOLUTION INTRAVENOUS PRN
Status: DISCONTINUED | OUTPATIENT
Start: 2021-10-15 | End: 2021-10-15 | Stop reason: SDUPTHER

## 2021-10-15 RX ORDER — SODIUM CHLORIDE 0.9 % (FLUSH) 0.9 %
10 SYRINGE (ML) INJECTION PRN
Status: DISCONTINUED | OUTPATIENT
Start: 2021-10-15 | End: 2021-10-15 | Stop reason: HOSPADM

## 2021-10-15 RX ORDER — SODIUM CHLORIDE, SODIUM LACTATE, POTASSIUM CHLORIDE, CALCIUM CHLORIDE 600; 310; 30; 20 MG/100ML; MG/100ML; MG/100ML; MG/100ML
INJECTION, SOLUTION INTRAVENOUS CONTINUOUS
Status: DISCONTINUED | OUTPATIENT
Start: 2021-10-15 | End: 2021-10-15 | Stop reason: HOSPADM

## 2021-10-15 RX ORDER — LIDOCAINE HYDROCHLORIDE 20 MG/ML
INJECTION, SOLUTION INFILTRATION; PERINEURAL PRN
Status: DISCONTINUED | OUTPATIENT
Start: 2021-10-15 | End: 2021-10-15 | Stop reason: SDUPTHER

## 2021-10-15 RX ORDER — MEPERIDINE HYDROCHLORIDE 50 MG/ML
12.5 INJECTION INTRAMUSCULAR; INTRAVENOUS; SUBCUTANEOUS EVERY 5 MIN PRN
Status: DISCONTINUED | OUTPATIENT
Start: 2021-10-15 | End: 2021-10-15 | Stop reason: HOSPADM

## 2021-10-15 RX ORDER — ONDANSETRON 4 MG/1
4 TABLET, ORALLY DISINTEGRATING ORAL 3 TIMES DAILY PRN
Qty: 21 TABLET | Refills: 0 | Status: SHIPPED | OUTPATIENT
Start: 2021-10-15 | End: 2022-02-14

## 2021-10-15 RX ORDER — SODIUM CHLORIDE 9 MG/ML
25 INJECTION, SOLUTION INTRAVENOUS PRN
Status: DISCONTINUED | OUTPATIENT
Start: 2021-10-15 | End: 2021-10-15 | Stop reason: HOSPADM

## 2021-10-15 RX ORDER — PROMETHAZINE HYDROCHLORIDE 25 MG/ML
6.25 INJECTION, SOLUTION INTRAMUSCULAR; INTRAVENOUS
Status: DISCONTINUED | OUTPATIENT
Start: 2021-10-15 | End: 2021-10-15 | Stop reason: HOSPADM

## 2021-10-15 RX ORDER — ONDANSETRON 2 MG/ML
INJECTION INTRAMUSCULAR; INTRAVENOUS PRN
Status: DISCONTINUED | OUTPATIENT
Start: 2021-10-15 | End: 2021-10-15 | Stop reason: SDUPTHER

## 2021-10-15 RX ORDER — MAGNESIUM HYDROXIDE 1200 MG/15ML
LIQUID ORAL CONTINUOUS PRN
Status: DISCONTINUED | OUTPATIENT
Start: 2021-10-15 | End: 2021-10-15 | Stop reason: ALTCHOICE

## 2021-10-15 RX ORDER — OXYCODONE HYDROCHLORIDE 5 MG/1
5 TABLET ORAL EVERY 6 HOURS PRN
Qty: 12 TABLET | Refills: 0 | Status: SHIPPED | OUTPATIENT
Start: 2021-10-15 | End: 2021-10-18

## 2021-10-15 RX ORDER — OXYMETAZOLINE HYDROCHLORIDE 0.05 G/100ML
SPRAY NASAL PRN
Status: DISCONTINUED | OUTPATIENT
Start: 2021-10-15 | End: 2021-10-15 | Stop reason: ALTCHOICE

## 2021-10-15 RX ORDER — SODIUM CHLORIDE 0.9 % (FLUSH) 0.9 %
10 SYRINGE (ML) INJECTION EVERY 12 HOURS SCHEDULED
Status: DISCONTINUED | OUTPATIENT
Start: 2021-10-15 | End: 2021-10-15 | Stop reason: HOSPADM

## 2021-10-15 RX ORDER — PROPOFOL 10 MG/ML
INJECTION, EMULSION INTRAVENOUS PRN
Status: DISCONTINUED | OUTPATIENT
Start: 2021-10-15 | End: 2021-10-15 | Stop reason: SDUPTHER

## 2021-10-15 RX ORDER — KETOROLAC TROMETHAMINE 30 MG/ML
INJECTION, SOLUTION INTRAMUSCULAR; INTRAVENOUS PRN
Status: DISCONTINUED | OUTPATIENT
Start: 2021-10-15 | End: 2021-10-15 | Stop reason: SDUPTHER

## 2021-10-15 RX ORDER — OXYCODONE HYDROCHLORIDE AND ACETAMINOPHEN 5; 325 MG/1; MG/1
2 TABLET ORAL PRN
Status: DISCONTINUED | OUTPATIENT
Start: 2021-10-15 | End: 2021-10-15 | Stop reason: HOSPADM

## 2021-10-15 RX ORDER — ECHINACEA PURPUREA EXTRACT 125 MG
1 TABLET ORAL
Qty: 14 ML | Refills: 5 | Status: SHIPPED | OUTPATIENT
Start: 2021-10-15 | End: 2022-01-13 | Stop reason: SDUPTHER

## 2021-10-15 RX ORDER — MORPHINE SULFATE 2 MG/ML
1 INJECTION, SOLUTION INTRAMUSCULAR; INTRAVENOUS EVERY 5 MIN PRN
Status: DISCONTINUED | OUTPATIENT
Start: 2021-10-15 | End: 2021-10-15 | Stop reason: HOSPADM

## 2021-10-15 RX ORDER — HYDRALAZINE HYDROCHLORIDE 20 MG/ML
5 INJECTION INTRAMUSCULAR; INTRAVENOUS EVERY 10 MIN PRN
Status: DISCONTINUED | OUTPATIENT
Start: 2021-10-15 | End: 2021-10-15 | Stop reason: HOSPADM

## 2021-10-15 RX ORDER — LABETALOL HYDROCHLORIDE 5 MG/ML
5 INJECTION, SOLUTION INTRAVENOUS EVERY 10 MIN PRN
Status: DISCONTINUED | OUTPATIENT
Start: 2021-10-15 | End: 2021-10-15 | Stop reason: HOSPADM

## 2021-10-15 RX ORDER — MIDAZOLAM HYDROCHLORIDE 1 MG/ML
INJECTION INTRAMUSCULAR; INTRAVENOUS PRN
Status: DISCONTINUED | OUTPATIENT
Start: 2021-10-15 | End: 2021-10-15 | Stop reason: SDUPTHER

## 2021-10-15 RX ORDER — MORPHINE SULFATE 2 MG/ML
2 INJECTION, SOLUTION INTRAMUSCULAR; INTRAVENOUS EVERY 5 MIN PRN
Status: DISCONTINUED | OUTPATIENT
Start: 2021-10-15 | End: 2021-10-15 | Stop reason: HOSPADM

## 2021-10-15 RX ORDER — LIDOCAINE HYDROCHLORIDE AND EPINEPHRINE 10; 10 MG/ML; UG/ML
INJECTION, SOLUTION INFILTRATION; PERINEURAL PRN
Status: DISCONTINUED | OUTPATIENT
Start: 2021-10-15 | End: 2021-10-15 | Stop reason: ALTCHOICE

## 2021-10-15 RX ORDER — IBUPROFEN 600 MG/1
600 TABLET ORAL 4 TIMES DAILY PRN
Qty: 120 TABLET | Refills: 0 | Status: SHIPPED | OUTPATIENT
Start: 2021-10-15

## 2021-10-15 RX ORDER — ACETAMINOPHEN 500 MG
500 TABLET ORAL 4 TIMES DAILY PRN
Qty: 120 TABLET | Refills: 0 | Status: SHIPPED | OUTPATIENT
Start: 2021-10-15 | End: 2022-05-19

## 2021-10-15 RX ADMIN — FENTANYL CITRATE 50 MCG: 50 INJECTION, SOLUTION INTRAMUSCULAR; INTRAVENOUS at 07:51

## 2021-10-15 RX ADMIN — LIDOCAINE HYDROCHLORIDE 60 MG: 20 INJECTION, SOLUTION INFILTRATION; PERINEURAL at 07:32

## 2021-10-15 RX ADMIN — KETOROLAC TROMETHAMINE 30 MG: 30 INJECTION, SOLUTION INTRAMUSCULAR; INTRAVENOUS at 10:08

## 2021-10-15 RX ADMIN — SODIUM CHLORIDE, SODIUM LACTATE, POTASSIUM CHLORIDE, AND CALCIUM CHLORIDE: .6; .31; .03; .02 INJECTION, SOLUTION INTRAVENOUS at 07:29

## 2021-10-15 RX ADMIN — CEFAZOLIN 2 G: 10 INJECTION, POWDER, FOR SOLUTION INTRAVENOUS at 07:40

## 2021-10-15 RX ADMIN — LABETALOL HYDROCHLORIDE 5 MG: 5 INJECTION INTRAVENOUS at 11:02

## 2021-10-15 RX ADMIN — HYDROMORPHONE HYDROCHLORIDE 0.5 MG: 2 INJECTION INTRAMUSCULAR; INTRAVENOUS; SUBCUTANEOUS at 08:22

## 2021-10-15 RX ADMIN — SUGAMMADEX 200 MG: 100 INJECTION, SOLUTION INTRAVENOUS at 10:13

## 2021-10-15 RX ADMIN — FAMOTIDINE 20 MG: 10 INJECTION, SOLUTION INTRAVENOUS at 07:08

## 2021-10-15 RX ADMIN — MIDAZOLAM HYDROCHLORIDE 2 MG: 2 INJECTION, SOLUTION INTRAMUSCULAR; INTRAVENOUS at 07:25

## 2021-10-15 RX ADMIN — LABETALOL HYDROCHLORIDE 5 MG: 5 INJECTION, SOLUTION INTRAVENOUS at 09:06

## 2021-10-15 RX ADMIN — PROPOFOL 50 MG: 10 INJECTION, EMULSION INTRAVENOUS at 07:50

## 2021-10-15 RX ADMIN — FENTANYL CITRATE 50 MCG: 50 INJECTION, SOLUTION INTRAMUSCULAR; INTRAVENOUS at 07:48

## 2021-10-15 RX ADMIN — LABETALOL HYDROCHLORIDE 5 MG: 5 INJECTION, SOLUTION INTRAVENOUS at 09:11

## 2021-10-15 RX ADMIN — FENTANYL CITRATE 100 MCG: 50 INJECTION, SOLUTION INTRAMUSCULAR; INTRAVENOUS at 07:32

## 2021-10-15 RX ADMIN — ROCURONIUM BROMIDE 50 MG: 10 SOLUTION INTRAVENOUS at 07:32

## 2021-10-15 RX ADMIN — PROPOFOL 150 MG: 10 INJECTION, EMULSION INTRAVENOUS at 07:32

## 2021-10-15 RX ADMIN — SODIUM CHLORIDE, SODIUM LACTATE, POTASSIUM CHLORIDE, AND CALCIUM CHLORIDE: .6; .31; .03; .02 INJECTION, SOLUTION INTRAVENOUS at 07:08

## 2021-10-15 RX ADMIN — DEXAMETHASONE SODIUM PHOSPHATE 8 MG: 4 INJECTION, SOLUTION INTRAMUSCULAR; INTRAVENOUS at 07:42

## 2021-10-15 RX ADMIN — ONDANSETRON 4 MG: 2 INJECTION INTRAMUSCULAR; INTRAVENOUS at 10:08

## 2021-10-15 RX ADMIN — FENTANYL CITRATE 50 MCG: 50 INJECTION, SOLUTION INTRAMUSCULAR; INTRAVENOUS at 08:17

## 2021-10-15 ASSESSMENT — PULMONARY FUNCTION TESTS
PIF_VALUE: 26
PIF_VALUE: 26
PIF_VALUE: 29
PIF_VALUE: 27
PIF_VALUE: 26
PIF_VALUE: 21
PIF_VALUE: 28
PIF_VALUE: 26
PIF_VALUE: 11
PIF_VALUE: 26
PIF_VALUE: 27
PIF_VALUE: 18
PIF_VALUE: 19
PIF_VALUE: 26
PIF_VALUE: 26
PIF_VALUE: 18
PIF_VALUE: 26
PIF_VALUE: 26
PIF_VALUE: 27
PIF_VALUE: 18
PIF_VALUE: 26
PIF_VALUE: 27
PIF_VALUE: 28
PIF_VALUE: 15
PIF_VALUE: 26
PIF_VALUE: 27
PIF_VALUE: 7
PIF_VALUE: 8
PIF_VALUE: 27
PIF_VALUE: 26
PIF_VALUE: 29
PIF_VALUE: 26
PIF_VALUE: 29
PIF_VALUE: 26
PIF_VALUE: 14
PIF_VALUE: 26
PIF_VALUE: 29
PIF_VALUE: 26
PIF_VALUE: 26
PIF_VALUE: 27
PIF_VALUE: 26
PIF_VALUE: 1
PIF_VALUE: 26
PIF_VALUE: 18
PIF_VALUE: 26
PIF_VALUE: 28
PIF_VALUE: 26
PIF_VALUE: 11
PIF_VALUE: 13
PIF_VALUE: 28
PIF_VALUE: 26
PIF_VALUE: 28
PIF_VALUE: 15
PIF_VALUE: 27
PIF_VALUE: 26
PIF_VALUE: 20
PIF_VALUE: 26
PIF_VALUE: 3
PIF_VALUE: 26
PIF_VALUE: 27
PIF_VALUE: 19
PIF_VALUE: 29
PIF_VALUE: 29
PIF_VALUE: 27
PIF_VALUE: 26
PIF_VALUE: 27
PIF_VALUE: 26
PIF_VALUE: 21
PIF_VALUE: 17
PIF_VALUE: 28
PIF_VALUE: 26
PIF_VALUE: 26
PIF_VALUE: 28
PIF_VALUE: 27
PIF_VALUE: 26
PIF_VALUE: 26
PIF_VALUE: 16
PIF_VALUE: 28
PIF_VALUE: 26
PIF_VALUE: 9
PIF_VALUE: 27
PIF_VALUE: 26
PIF_VALUE: 28
PIF_VALUE: 26
PIF_VALUE: 27
PIF_VALUE: 26
PIF_VALUE: 26
PIF_VALUE: 28
PIF_VALUE: 26
PIF_VALUE: 29
PIF_VALUE: 26
PIF_VALUE: 26
PIF_VALUE: 27
PIF_VALUE: 9
PIF_VALUE: 26
PIF_VALUE: 27
PIF_VALUE: 26
PIF_VALUE: 27
PIF_VALUE: 21
PIF_VALUE: 27
PIF_VALUE: 26
PIF_VALUE: 21
PIF_VALUE: 26
PIF_VALUE: 18
PIF_VALUE: 27
PIF_VALUE: 28
PIF_VALUE: 27
PIF_VALUE: 28
PIF_VALUE: 26
PIF_VALUE: 28
PIF_VALUE: 27
PIF_VALUE: 26
PIF_VALUE: 25
PIF_VALUE: 29
PIF_VALUE: 1
PIF_VALUE: 26
PIF_VALUE: 26

## 2021-10-15 ASSESSMENT — PAIN - FUNCTIONAL ASSESSMENT: PAIN_FUNCTIONAL_ASSESSMENT: 0-10

## 2021-10-15 NOTE — PROGRESS NOTES
Patient arrives from OR to PACU rrom 4. Report received from OR RN and anesthesia. Patient drowsy, mouth breathing with apnea, but opens eyes to name. O2 sats 89 on RA. Facemask applied with O2 at 12 L.  O2 sats currently at 99%

## 2021-10-15 NOTE — PROGRESS NOTES
IV removed. All personal belongings returned to patient. Wife at bedside. Discharge instructions reviewed, all questions answered. Patient with scripts ready at [facility] pharmacy. Patient and wife deny additional needs. Patient being discharged home.

## 2021-10-15 NOTE — ANESTHESIA PRE PROCEDURE
Department of Anesthesiology  Preprocedure Note       Name:  Michelle Zambrano   Age:  61 y.o.  :  1955                                          MRN:  [id number]         Date:  10/15/2021      Surgeon: Jessie Mohs):  Geoffrey Fatima MD    Procedure: Procedure(s):  BILATERAL INFERIOR TURBINATE REDUCTION, BILATERAL TOTAL ETHMOIDECTOMY WITH SPHENOIDECTOMY, DIRECT LARYNGOSCOPY WITH BIOPSY AND  SEPTOPLASTY    Medications prior to admission:   Prior to Admission medications    Medication Sig Start Date End Date Taking? Authorizing Provider   omeprazole (PRILOSEC) 20 MG delayed release capsule TAKE 1 CAPSULE DAILY 10/13/21  Yes Geoffrey Fatima MD   gabapentin (NEURONTIN) 300 MG capsule Take 1 capsule by mouth daily.   Patient taking differently: Take 300 mg by mouth every evening.  21 Yes JACE Bateman CNP   traZODone (DESYREL) 100 MG tablet TAKE 1 TABLET NIGHTLY  Patient taking differently: Take 100 mg by mouth nightly TAKE 1 TABLET NIGHTLY 21  Yes JACE Bateman CNP   lisinopril-hydroCHLOROthiazide (PRINZIDE;ZESTORETIC) 20-12.5 MG per tablet TAKE 2 TABLETS BY MOUTH EVERY MORNING  Patient taking differently: Take 1 tablet by mouth 2 times daily TAKE 2 TABLETS BY MOUTH EVERY MORNING 21  Yes JACE Bateman CNP   atorvastatin (LIPITOR) 40 MG tablet Take 1 tablet by mouth daily 21  Yes JACE Bateman CNP   beclomethasone (QVAR) 80 MCG/ACT inhaler Inhale 1 puff into the lungs 2 times daily  Patient taking differently: Inhale 1 puff into the lungs daily  21  Yes Geoffrey Fatima MD       Current medications:    Current Facility-Administered Medications   Medication Dose Route Frequency Provider Last Rate Last Admin    lactated ringers infusion   IntraVENous Continuous Juan Pablo Watson  mL/hr at 10/15/21 0708 New Bag at 10/15/21 0708    sodium chloride flush 0.9 % injection 10 mL  10 mL IntraVENous 2 times per day Juan Pablo Watson MD        sodium chloride flush 0.9 % injection 10 mL  10 mL IntraVENous PRN Sonny Ferrer MD        0.9 % sodium chloride infusion  25 mL IntraVENous PRN Sonny Ferrer MD        lidocaine 1 % injection                Allergies:     Allergies   Allergen Reactions    Codeine Rash    Penicillins Rash       Problem List:    Patient Active Problem List   Diagnosis Code    Insomnia G47.00    Elevated fasting glucose R73.01    Essential hypertension, benign I10    Mixed hyperlipidemia E78.2    Palpitations R00.2    Heart rate fast R00.0    Family history of early CAD Z80.55    Nasal congestion R09.81    Chronic cough R05.3       Past Medical History:        Diagnosis Date    Deviated septum     Elevated fasting glucose     Hyperlipidemia     Hypertension     Insomnia     Sleep apnea     CPAP    Supraglottic mass        Past Surgical History:        Procedure Laterality Date    COLONOSCOPY  11/20/13    [facility]    WISDOM TOOTH EXTRACTION         Social History:    Social History     Tobacco Use    Smoking status: Never Smoker    Smokeless tobacco: Never Used   Substance Use Topics    Alcohol use: Not Currently     Comment: beer every 1-2 weeks                                Counseling given: Not Answered      Vital Signs (Current):   Vitals:    10/13/21 1502 10/15/21 0612   BP:  (!) 156/91   Pulse:  70   Resp:  16   Temp:  97.5 °F (36.4 °C)   SpO2:  95%   Weight: 236 lb (107 kg) 234 lb 1.6 oz (106.2 kg)   Height: 6' 2\" (1.88 m) 6' 1\" (1.854 m)                                              BP Readings from Last 3 Encounters:   10/15/21 (!) 156/91   10/11/21 132/88   08/31/21 130/86       NPO Status: Time of last liquid consumption: 1800                        Time of last solid consumption: 1800                        Date of last liquid consumption: 10/14/21                        Date of last solid food consumption: 10/14/21    BMI:   Wt Readings from Last 3 Encounters:   10/15/21 234 lb 1.6 oz (106.2 kg)   10/11/21 226 lb (102.5 kg)   08/31/21 236 lb (107 kg)     Body mass index is 30.89 kg/m². CBC:   Lab Results   Component Value Date    WBC 7.0 02/18/2021    RBC 5.11 02/18/2021    HGB 15.1 02/18/2021    HCT 43.9 02/18/2021    MCV 86.1 02/18/2021    RDW 13.6 02/18/2021     02/18/2021       CMP:   Lab Results   Component Value Date     10/15/2021    K 4.1 10/15/2021     10/15/2021    CO2 28 10/15/2021    BUN 12 10/15/2021    CREATININE 1.0 10/15/2021    GFRAA >60 10/15/2021    GFRAA >60 03/25/2013    AGRATIO 2.0 02/18/2021    LABGLOM >60 10/15/2021    GLUCOSE 126 10/15/2021    PROT 7.1 02/18/2021    PROT 6.9 09/24/2012    CALCIUM 9.2 10/15/2021    BILITOT 0.7 02/18/2021    ALKPHOS 87 02/18/2021    AST 24 02/18/2021    ALT 42 02/18/2021       POC Tests: No results for input(s): POCGLU, POCNA, POCK, POCCL, POCBUN, POCHEMO, POCHCT in the last 72 hours.     Coags: No results found for: PROTIME, INR, APTT    HCG (If Applicable): No results found for: PREGTESTUR, PREGSERUM, HCG, HCGQUANT     ABGs: No results found for: PHART, PO2ART, FHI0RIH, EAN3HDG, BEART, Y5AFITJM     Type & Screen (If Applicable):  No results found for: LABABO, LABRH    Drug/Infectious Status (If Applicable):  No results found for: HIV, HEPCAB    COVID-19 Screening (If Applicable):   Lab Results   Component Value Date    COVID19 Not Detected 10/11/2021           Anesthesia Evaluation   no history of anesthetic complications:   Airway: Mallampati: II  TM distance: >3 FB   Neck ROM: full  Mouth opening: > = 3 FB Dental: normal exam         Pulmonary:   (+) sleep apnea: on CPAP,                             Cardiovascular:    (+) hyperlipidemia                  Neuro/Psych:   Negative Neuro/Psych ROS              GI/Hepatic/Renal: Neg GI/Hepatic/Renal ROS            Endo/Other: Negative Endo/Other ROS                    Abdominal:             Vascular: negative

## 2021-10-15 NOTE — PROGRESS NOTES
Patient presents ambulatory to room 2. Patient voided, IV established and belongings placed in locker #2. Family at bedside.

## 2021-10-15 NOTE — OP NOTE
Patient Name: Mohsen Ruth  YOB: 1955  Medical Record Number:  [id number]  Billing Number:  [id number]  Date of Procedure: 10/15/2021  Time: 0730    Pre Operative Diagnoses:  Nasal obstruction, septal deviation, bilateral inferior turbinate hypertrophy  Post Operative Diagnoses:  Same           Procedure:    1. Nasal septal reconstruction (00738)  2. Bilateral submucous turbinate reduction (73071 -50)  3. Bilateral turbinate out-fracture (72138 -50)  4. Direct laryngoscopy and biopsy with use of operating telescope (54400)           Surgeon: Nain Macias MD  OR Staff: Circulator: Ailyn Hdz RN  Surgical Assistant: Silvio Rodriguez  Scrub Person First: Candice WorkThink  Circulator Assist: Ashsih Desai RN  Anesthesia:  General anesthesia. Specimens:   ID Type Source Tests Collected by Time Destination   A : left ethmoid mass; suspect granuloma Specimen Mouth SURGICAL PATHOLOGY Nain Macias MD 10/15/2021 0803    *Please note that the above ID should be \"left vocal cord mass, suspect granuloma    Estimated Blood Loss: 249EE  Complications:  None. Findings:    1. Oral cavity: large bilateral torus mandibularis, no cleft palate  2. Oropharynx: no lesions/masses  3. Base of tongue/epiglottis: no lesions identified  4. Endolarynx: heaped-up tissue at the left muscular process of the arytenoid, localized without significant mucosal erosion or submucosal swelling. False vocal cords, laryngeal ventricle, true vocal cords clear of lesions  5. Subglottis/trachea: no lesions identified  6. Nasal passages: Left caudal septal bony deflection, 3+ turbinates bilaterally    Indications:  Beth Lopez is a now 61 y.o. 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 After a discussion and benefits she agreed to proceed with surgery. Description:   After verification of informed consent, the patient was taken to the operating suite, transferred to the operating table, sedated with general anesthesia and endotracheally intubated. The nose was injected with 1% lidocaine 1 100,000 epinephrine and decongested with oxymetazoline soaked pledgets. We began with direct laryngoscopy. The table was turned 90-degrees counterclockwise and the Dedo laryngoscope with dental guard was used to expose the larynx the supraglottis and glottis. A thorough evaluation of the oral cavity, vallecula, base of tongue, bilateral tonsils, true and false vocal cords and bilateral pyriform sinuses was performed - a 4mm Colin-Storz rigid telescope was used to take images. The laryngoscope was placed on suspension and the endolarynx evaluated -at the site of the lesion (left arytenoid muscular process), multiple biopsies were taken with cup forceps and sent for routine pathology. Frozen sections did not reveal malignancy. Hemostasis was obtained with Afrin-soaked pledgets and following confirmation of no active hemorrhage the laryngoscope and mouthguard were withdrawn. Interim counts were correct and the table was turned 90-degrees clockwise. We then turned to nasal surgery. Using an angled Solomon blade diatheramy a left-sided [place] incision was made in the septal mucosa 1.5cm posterior to the nasal sill. Dissection was carried down to the level of the mucoperichondrial plane. A Byron elevator was used to elevate a mucoperichondrial flap. Once the flap was elevated the blade was used to make a transverse cartilaginous incision. The contralateral flap is elevated in similar fashion. A David swivel knife was used to resect a portion of the quadrangular cartilage leaving sufficient superior and anterior cartilage for tip and dorsal support. Once this was complete an through-cutting forceps were used to make a cut in  the perpendicular plate of ethmoid. Alvia Lilliana forceps were then used to piecemeal removed perpendicular plate of ethmoid and vomer. Eddie Diver was used to elevate the flaps off the maxillary crest.  An osteotome was used to remove the deviated portion of the maxillary crest.  The flaps were laid placed back down. The [place] incision was closed with 4-0 chromic simple interrupted suture. A 4-0 plan gut on a straight needle was used to mattress the nasal septum and tied on the left side. Attention was then turned to the inferior turbinates. The inferior turbinates were injected with 1% lidocaine with 1 100,000 epinephrine. The [place] blade was used is used to make an incision at the heads of the inferior turbinates bilaterally. Suction [place] Diver was used to dissect in a subperiosteal plane along the length of the turbinate. The 2.0 Xomed microdebrider turbinate wand was used to resect submucosal turbinate tissue. A Boies elevator was then used to outfracture the inferior turbinates bilaterally. Examination of the nasal passages reveal significant improvement in the obstruction. Bactroban coated Lewis splints were then placed in the nares bilaterally and sutured in place with a 4-0 nylon suture. This concluded our portion of the procedure. The care the patient was turned back over to the anesthesia team.  The head of bed is rotated and degrees. The patient was taken off anesthesia extubated and transferred to PACU in stable condition. There were no competitions with the procedure. The patient tolerated the procedure well. Disposition/Plan:  Stable to PACU.

## 2021-10-15 NOTE — PROGRESS NOTES
Heart rate 114 and blood pressure remains elevated. Labetolol 5mg IVP given as per protocol.  Heart rate down to 60's NSR

## 2021-10-15 NOTE — ANESTHESIA POSTPROCEDURE EVALUATION
Department of Anesthesiology  Postprocedure Note    Patient: Yasmany Ceja  MRN: [id number]  YOB: 1955  Date of evaluation: 10/15/2021  Time:  5:22 PM     Procedure Summary     Date: 10/15/21 [place] / Location: [place] [address] / [facility]    Anesthesia Start: 6175 Anesthesia Stop: 1036    Procedures:       DIRECT LARYNGOSCOPY WITH BIOPSY AND USE OF OPERATING TELESCOPE, SEPTOPLASTY, BILATERAL INFERIOR TURBINATE REDUCTION AND OUTFRACTURE (N/A Nose)      SEPTOPLASTY (N/A Head) Diagnosis:       Nasal obstruction      Laryngeal mass      (NASAL OBSTRUCTION; LARYNGEAL MASS)    Surgeons: Fozia Cody MD Responsible Provider: Leny Mackey MD    Anesthesia Type: general ASA Status: 2          Anesthesia Type: general    Sultana Phase I: Sultana Score: 7    Sultana Phase II: Sultana Score: 10    Last vitals: Reviewed and per EMR flowsheets.        Anesthesia Post Evaluation    Comments: Postoperative Anesthesia Note    Name:    Yasmany Ceja  MRN:      [id number]    Patient Vitals in the past 12 hrs:  10/15/21 1219, BP:(!) 150/82, Pulse:65, SpO2:92 %  10/15/21 1200, Temp:98.2 °F (36.8 °C), Temp src:Temporal  10/15/21 1140, BP:(!) 154/83, Pulse:63, SpO2:92 %  10/15/21 1135, BP:(!) 151/92, Pulse:62, SpO2:92 %  10/15/21 1130, BP:(!) 154/88, Pulse:63, SpO2:92 %  10/15/21 1125, Pulse:63, SpO2:93 %  10/15/21 1120, Pulse:64, SpO2:92 %  10/15/21 1115, BP:(!) 159/92, Pulse:62, SpO2:93 %  10/15/21 1110, Pulse:115, SpO2:96 %  10/15/21 1105, BP:(!) 151/108, Pulse:116, SpO2:98 %  10/15/21 1100, BP:(!) 147/115, Pulse:115, SpO2:98 %  10/15/21 1055, BP:(!) 145/113, Pulse:120, SpO2:96 %  10/15/21 1050, BP:(!) 151/115, Pulse:112, SpO2:98 %  10/15/21 1045, BP:(!) 155/111, Pulse:118, SpO2:100 %  10/15/21 1040, BP:(!) 113/91, Pulse:114, SpO2:100 %  10/15/21 1035, BP:(!) 137/102, Pulse:118, Resp:10, SpO2:92 %  10/15/21 0612, BP:(!) 156/91, Temp:97.5 °F (36.4 °C), Pulse:70, Resp:16, SpO2:95 %, Height:6' 1\" (1.854 m), Weight:234 lb 1.6 oz (106.2 kg)     LABS:    CBC  Lab Results       Component                Value               Date/Time                  WBC                      6.4                 10/15/2021 06:35 AM        HGB                      14.5                10/15/2021 06:35 AM        HCT                      41.2                10/15/2021 06:35 AM        PLT                      180                 10/15/2021 06:35 AM   RENAL  Lab Results       Component                Value               Date/Time                  NA                       142                 10/15/2021 06:35 AM        K                        4.1                 10/15/2021 06:35 AM        CL                       105                 10/15/2021 06:35 AM        CO2                      28                  10/15/2021 06:35 AM        BUN                      12                  10/15/2021 06:35 AM        CREATININE               1.0                 10/15/2021 06:35 AM        GLUCOSE                  126 (H)             10/15/2021 06:35 AM   COAGS  No results found for: PROTIME, INR, APTT    Intake & Output: In: 900 (I.V.:900)  Out: -     Nausea & Vomiting:  No    Level of Consciousness:  Awake    Pain Assessment:  Adequate analgesia    Anesthesia Complications:  No apparent anesthetic complications    SUMMARY      Vital signs stable  OK to discharge from Stage I post anesthesia care.   Care transferred from Anesthesiology department on discharge from perioperative area

## 2021-10-15 NOTE — H&P
[address] UP VISIT        Patient Name: [address] [place] [address] Record Number:  [id number]  Primary Care Physician:  JACE Blum - CNP     ChiefComplaint           Chief Complaint   Patient presents with    Follow-up       Pt states he is here for a 3 month follow up for sore throat and cough. pt states neither has improved. pt states the cough has gotten some what better. pt states his cough is very dry.          History of Present Illness      Mack Gan is an 63 y. o. male history of nasal congestion and chronic cough.     Cough has been ongoing for the past 10 years - is dry, intermittent throughout the day, non-productive, with no defined exacerbating triggers or alleviating factors. He believes his throat is always dry, and occasionally taking a throat lozenge will improve his dry throat and may help his cough. Prior use of lisinopril, however his primary care provider discontinued this 02/2020 and he continues to have cough. Occasional heartburn depending on diet (with spicy foods), but otherwise no hoarseness, dysphagia, odynophagia, dyspnea, hoarseness.     Also with nasal congestion, feels that his nose closes up when supine. No rhinorrhea, post-nasal drip, Has KENDALL, on CPAP machine. Has been on fluticasone for 2 weeks prior to bedtime (believes this helps initially)     Has history of elevated fasting glucose but most recent BMP was normal (glucose 110).  Takes carvedilol for HTN. Hemoglobin A1c 5.4 on 08/2020.  No history of head and neck imaging, sinus surgery or nasal trauma.     No history of tobacco use, 2 cups of coffee daily, soda 1/week, spicy foods 2-3x/month. Minimal use of water daily.      Interval History 9/24/2020: Since last visit he has been using omeprazole 40 mg twice daily as prescribed. Continues to drink 2 cups of coffee daily, 1 soda a week and uses spicy foods.   Has increased hydration, although is concerned that this may be leading to him going to the bathroom more and in the middle of the night.     His chronic cough has resolved no longer states nonproductive, intermittent cough; occasional throat dryness, no water brash, hoarseness or chest burning.     He continues to have nasal congestion every night. Goes to bed at approximately 930, and by about 11:30 in the morning he typically removes his CPAP as he feels he cannot breathe through his nose. No postnasal drip, anterior rhinorrhea, nasal pain, epistaxis. Has been using fluticasone as prescribed without improvement in symptoms.     Interval History 10/26/2020: No improvement in nasal congestion with azelastine and flonase. Continues to have nasal congestion every night several hours after bedtime, has attempted elevating the head of his bed with no improvement.     Interval History 12/3/2020: Patient has been using the beclomethasone inhaler twice daily during the weekdays, 3 times daily on weekends for the past month. States significant improvement in chronic cough; has also been using antireflux medications (which he states had no improvement). Denies any hoarse voice, dysphagia/odynophagia.     Interval History 4/14/2021: Continues to have improvement in chronic cough with beclomethasone inhaler.   However, states significant nasal congestion every night several hours after bedtime, no improvement with ipratropium bromide or head elevation    Interval History 10/15/2021: Continued nasal congestion nightly - no improvement with nasal sprays.      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  NEURO: No TIA or stroke-like symptoms  SKIN: No new rashes in the head and neck, no recent skin cancers  MOUTH: Delete no new ulcers, no recent teeth infections  GASTROINTESTINAL: No diarrhea, stomach pain  PSYCH: No anxiety, no depression     PhysicalExam      Vitals       Vitals:     04/14/21 1536   BP: (!) 149/85   Site: Right Upper Arm   Position: Sitting   Cuff Size: Large Adult   Pulse: 56   Temp: 97.2 °F (36.2 °C)   TempSrc: Infrared   Weight: 239 lb 6.4 oz (108.6 kg)   Height: 6' 1\" (1.854 m)            PHYSICAL EXAM  BP (!) 149/85 (Site: Right Upper Arm, Position: Sitting, Cuff Size: Large Adult)   Pulse 56   Temp 97.2 °F (36.2 °C) (Infrared)   Ht 6' 1\" (1.854 m)   Wt 239 lb 6.4 oz (108.6 kg)   BMI 31.59 kg/m²      GENERAL: No Acute Distress, Alert and Oriented, no hoarseness  EYES: EOMI, Anti-icteric  NOSE: On anterior rhinoscopy there is no epistaxis, nasal mucosa within normal limits, no purulent drainage. 3+ bilateral inferior turbinate perjury, no improvement with decongestion.   EARS: Normal external appearance; on portable otomicroscopy:  -Right ear: External auditory canal without stenosis, tympanic membrane clear, no middle ear effusions or retractions  -Left ear: External auditory canal without stenosis, tympanic membrane clear, no middle ear effusions or retractions  FACE: 1/6 House-Brackmann Scale, symmetric, sensation equal bilaterally  ORAL CAVITY: No masses or lesions palpated, uvula is midline, moist mucous membranes, 1+ tonsils, good dentition  -Dental mirror exam: Base of tongue with no masses, uvula anatomically normal   NECK: Normal range of motion, no thyromegaly, trachea is midline, no lymphadenopathy, no neck masses, no crepitus  CHEST: Normal respiratory effort, no retractions, breathing comfortably  SKIN: No rashes, normal appearing skin, no evidence of skin lesions/tumors  NEURO: CN 2, 3, 4, 5, 6, 7, 11, 12 intact bilaterally      Procedure    Flexible Laryngoscopy (from prior clinic visit)     Pre op: Laryngeal mass, suspect granuloma. Post op: Left laryngeal granuloma  Procedure : Flexible Nasopharyngolaryngoscopy  Surgeon: DANUTA Carnes  Anesthesia: Afrin with 2% lidocaine  Estimated Blood Loss: None     Procedure:   Flexible Laryngoscopy      After obtaining consent, the patient was placed in the examination chair in the upright position. Decongestant and topical anesthetic was sprayed in the After allowing adequate time for hemostatic effect, the flexible 4 mm laryngoscope was passed via the right nasal passage     Nasal Septum: Right caudal septal deviation and spur, bilateral inferior turbinate hypertrophy, left margarita bullosa, no septal hematoma or perforations noted   Nasal Findings: No active hemorrhage, no nasal masses appreciated   Nasopharynx: Clear, no masses or inflammation  Oropharynx: Bilateral tonsillar tissue absent, no exophytic masses  Base of Tongue: Lingual tonsils within normal limits, vallecula without effacement  Epiglottis: Upright, in normal anatomical position  True Vocal Cord: Anatomically normal, left mass over the vocal process of the arytenoid has increased in sizesuspected granuloma, normal abduction and adduction   False Vocal Cord: normal   Hypopharynx Mucosa: No masses or inflammation of the piriform sinuses or postcricoid area  Arytenoids: Normal mucosa, no dislocation appreciated      * Patient tolerated the procedure well with no complications   * Patient was instructed not to eat for 30 minutes following procedure. * Patient was instructed that they may notice minor bleeding.     Attestation:   I was present for the entire viewing, including introduction and removal of the scope.          Prior images:                Assessment and Plan      1. Chronic cough  Patient with chronic cough which we attributed to laryngopharyngeal reflux.   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 Will have continued him on beclamethasone inhalers, and restarted his antireflux medication.      2. Nasal congestion, septal deviation, inferior turbinate hypertrophy  Patient with nasal congestion, worse in the dependent position involves over 4-5 hours while he is supine, and eventually he cannot breathe through his nose (even with CPAP in place). Does not improve with Flonase or Astelin, and we started him on Atrovent. On examination he has right mid septal deviation, bilateral inferior turbinate hypertrophy, and a left margarita bullosa. -Discussed surgery with the patient including septoplasty, bilateral inferior turbinate reduction and outfracture, left margarita bullosa resection  -Risks and benefits of septoplasty including pain, inflammation, infection, hemorrhage, cosmesis (including nasal valve collapse or saddle nose deformity), worsened nasal airway obstruction, hyposmia/anosmia, numbness to the teeth or gums, injury to the septum including septal perforation, need for further surgery-Risks of inferior turbinate reduction include pain, hemorrhage, infection, inflammation, need for further surgery, hyposmia/anosmia, nasal dryness, empty nose syndrome discussed  -General anesthesia carries independent risks which will be discussed by Anesthesiology on the day of surgery   -PCP cleared 10/11/2021     3. Supraglottic mass  On prior examination we appreciated a mass over the left vocal process of the arytenoid which we attributed to laryngopharyngeal reflux (believed to be a granuloma).   It had decreased in size following antireflux medication and steroid inhalation, however recently increased in size. Given our plan for septoplasty, we will also plan for biopsy of the massif malignant, will resected down to perichondrium and resurface the area with a KTP laser  -Risks and benefits of direct laryngoscopy and biopsy under general anesthesia include injury to the oral, oropharyngeal or laryngeal structures (teeth, lips, tongue, mucous membranes), hemorrhage, dysphagia/odynophagia, voice changes, need for further biopsies outlined. Patient in agreement with plan for procedure  -Fire risk and thermal damage risk with with KTP laser use discussed  -General anesthesia carries independent risks which will be discussed with Anesthesiology on day of procedure  -PCP cleared 10/11/2021     Plan for septoplasty, inferior turbinate reduction, margarita bullosa resection, direct laryngoscopy, biopsy, possible removal of left arytenoid lesion with KTP laser under general anesthesia    The patient was counseled at length about the risks of sukhi Covid-19 during their perioperative period and any recovery window from their procedure.  The patient was made aware that sukhi Covid-19  may worsen their prognosis for recovering from their procedure  and lend to a higher morbidity and/or mortality risk.  All material risks, benefits, and reasonable alternatives including postponing the procedure were discussed. The patient does wish to proceed with the procedure at this time.  [address] Shawn Lake MD  [address] [place] [address],[address]  Department of Otolaryngology/Head and Neck Surgery  4/14/21     I have performed a head and neck physical exam personally or was physically present during the key or critical portions of the service.     Medical Decision Making:   The following items were considered in medical decision making:  Independent review of images  Review / order clinical lab tests  Review / order radiology tests  Decision to obtain

## 2021-10-18 ENCOUNTER — TELEPHONE (OUTPATIENT)
Dept: ENT CLINIC | Age: 66
End: 2021-10-18

## 2021-10-18 NOTE — TELEPHONE ENCOUNTER
Patient is coming in for his [facility] appointment on Thursday 10/21/2021. Patient wants to know if its Normal for his left nostril to still be bleeding and if when Dr. Lara Crouch takes the stents out will it bleed again.     Call back number: [phone]

## 2021-10-18 NOTE — TELEPHONE ENCOUNTER
Called patient back - left naris with coagulated blood. Have asked him to continue salt water spray, mupirocin use - call clinic if active hemorrhage manifests. Follow up 10/21/2021.

## 2021-10-21 ENCOUNTER — OFFICE VISIT (OUTPATIENT)
Dept: ENT CLINIC | Age: 66
End: 2021-10-21

## 2021-10-21 VITALS — WEIGHT: 237.6 LBS | HEIGHT: 73 IN | TEMPERATURE: 98.1 F | BODY MASS INDEX: 31.49 KG/M2

## 2021-10-21 DIAGNOSIS — R09.81 NASAL CONGESTION: Primary | ICD-10-CM

## 2021-10-21 DIAGNOSIS — J38.7 SUPRAGLOTTIC MASS: ICD-10-CM

## 2021-10-21 DIAGNOSIS — R05.3 CHRONIC COUGH: ICD-10-CM

## 2021-10-21 PROCEDURE — 99024 POSTOP FOLLOW-UP VISIT: CPT | Performed by: OTOLARYNGOLOGY

## 2021-10-21 NOTE — PROGRESS NOTES
[address] UP VISIT      Patient Name: Kayode [facility] Record Number:  [id number]  Primary Care Physician:  JACE Aponte - DEEPTHI    ChiefComplaint     Chief Complaint   Patient presents with    Post-Op Check     States he is not bleeding from his nose but there is some discharge. States since surgery has been \"okay\"       History of Present Illness     Yasmany Ceja is an 63 y.o. male history of nasal congestion and chronic cough.     Cough has been ongoing for the past 10 years - is dry, intermittent throughout the day, non-productive, with no defined exacerbating triggers or alleviating factors. He believes his throat is always dry, and occasionally taking a throat lozenge will improve his dry throat and may help his cough. Prior use of lisinopril, however his primary care provider discontinued this 02/2020 and he continues to have cough. Occasional heartburn depending on diet (with spicy foods), but otherwise no hoarseness, dysphagia, odynophagia, dyspnea, hoarseness.     Also with nasal congestion, feels that his nose closes up when supine. No rhinorrhea, post-nasal drip, Has KENDALL, on CPAP machine. Has been on fluticasone for 2 weeks prior to bedtime (believes this helps initially)     Has history of elevated fasting glucose but most recent BMP was normal (glucose 110). Takes carvedilol for HTN. Hemoglobin A1c 5.4 on 08/2020. No history of head and neck imaging, sinus surgery or nasal trauma.     No history of tobacco use, 2 cups of coffee daily, soda 1/week, spicy foods 2-3x/month. Minimal use of water daily. Interval History 9/24/2020: Since last visit he has been using omeprazole 40 mg twice daily as prescribed. Continues to drink 2 cups of coffee daily, 1 soda a week and uses spicy foods.   Has increased hydration, although is concerned that this may be leading to him going to the bathroom more and in the middle of the night.    His chronic cough has resolved no longer states nonproductive, intermittent cough; occasional throat dryness, no water brash, hoarseness or chest burning. He continues to have nasal congestion every night. Goes to bed at approximately 930, and by about 11:30 in the morning he typically removes his CPAP as he feels he cannot breathe through his nose. No postnasal drip, anterior rhinorrhea, nasal pain, epistaxis. Has been using fluticasone as prescribed without improvement in symptoms. Interval History 10/26/2020: No improvement in nasal congestion with azelastine and flonase. Continues to have nasal congestion every night several hours after bedtime, has attempted elevating the head of his bed with no improvement. Interval History 12/3/2020: Patient has been using the beclomethasone inhaler twice daily during the weekdays, 3 times daily on weekends for the past month. States significant improvement in chronic cough; has also been using antireflux medications (which he states had no improvement). Denies any hoarse voice, dysphagia/odynophagia. Interval History 4/14/2021: Continues to have improvement in chronic cough with beclomethasone inhaler.   However, states significant nasal congestion every night several hours after bedtime, no improvement with ipratropium bromide or head elevation    Interval history 10/21/2021:    Past Medical History     Past Medical History:   Diagnosis Date    Deviated septum     Elevated fasting glucose     Hyperlipidemia     Hypertension     Insomnia     Sleep apnea     CPAP    Supraglottic mass        Past Surgical History     Past Surgical History:   Procedure Laterality Date    COLONOSCOPY  11/20/13   Yael Spurling HERNIA REPAIR  2006    [facility]  2004    SEPTOPLASTY N/A 10/15/2021    SEPTOPLASTY performed by Kaye Soler [facility] at [facility]. . 10/15/2021    DIRECT LARYNGOSCOPY WITH BIOPSY AND USE OF OPERATING TELESCOPE, SEPTOPLASTY, BILATERAL INFERIOR TURBINATE REDUCTION AND OUTFRACTURE performed by Keshav Limon MD at [facility]         Family History     Family History   Problem Relation Age of Onset    Diabetes Mother     Parkinsonism Father        Social History     Social History     Tobacco Use    Smoking status: Never Smoker    Smokeless tobacco: Never Used   Vaping Use    Vaping Use: Never used   Substance Use Topics    Alcohol use: Not Currently     Comment: beer every 1-2 weeks    Drug use: No        Allergies     Allergies   Allergen Reactions    Codeine Rash    Penicillins Rash       Medications     Current Outpatient Medications   Medication Sig Dispense Refill    acetaminophen (TYLENOL) 500 MG tablet Take 1 tablet by mouth 4 times daily as needed for Pain 120 tablet 0    ibuprofen (ADVIL;MOTRIN) 600 MG tablet Take 1 tablet by mouth 4 times daily as needed for Pain 120 tablet 0    ondansetron (ZOFRAN-ODT) 4 MG disintegrating tablet Take 1 tablet by mouth 3 times daily as needed for Nausea or Vomiting 21 tablet 0    mupirocin (BACTROBAN) 2 % ointment Apply to the nostril stitches three times daily. 22 g 1    sodium chloride (ALTAMIST SPRAY) 0.65 % nasal spray 1 spray by Nasal route every 2 hours (while awake) 14 mL 5    omeprazole (PRILOSEC) 20 MG delayed release capsule TAKE 1 CAPSULE DAILY 120 capsule 3    gabapentin (NEURONTIN) 300 MG capsule Take 1 capsule by mouth daily.  (Patient taking differently: Take 300 mg by mouth every evening. ) 90 capsule 3    traZODone (DESYREL) 100 MG tablet TAKE 1 TABLET NIGHTLY (Patient taking differently: Take 100 mg by mouth nightly TAKE 1 TABLET NIGHTLY) 90 tablet 3    lisinopril-hydroCHLOROthiazide (PRINZIDE;ZESTORETIC) 20-12.5 MG per tablet TAKE 2 TABLETS BY MOUTH EVERY MORNING (Patient taking differently: Take 1 tablet by mouth 2 times daily TAKE 2 TABLETS BY MOUTH EVERY MORNING) 180 tablet 3    atorvastatin (LIPITOR) 40 MG tablet Take 1 tablet by mouth daily 90 tablet 3    beclomethasone (QVAR) 80 MCG/ACT inhaler Inhale 1 puff into the lungs 2 times daily (Patient taking differently: Inhale 1 puff into the lungs daily ) 1 Inhaler 3     No current facility-administered medications for this visit.        Review of Systems     REVIEW OF SYSTEMS  The following systems were reviewed and revealed the following in addition to any already discussed in the HPI:    CONSTITUTIONAL: No weight loss, no fever, no night sweats, no chills  EYES: no vision changes, no blurry vision  EARS: no changes in hearing, no otalgia  NOSE: no epistaxis, no rhinorrhea  RESPIRATORY: No difficulty breathing, no shortness of breath  CV: no chest pain, no peripheral vascular disease  HEME: No coagulation disorder, no bleeding disorder  NEURO: No TIA or stroke-like symptoms  SKIN: No new rashes in the head and neck, no recent skin cancers  MOUTH: Delete no new ulcers, no recent teeth infections  GASTROINTESTINAL: No diarrhea, stomach pain  PSYCH: No anxiety, no depression    PhysicalExam     Vitals:    10/21/21 1519   Temp: 98.1 °F (36.7 °C)   TempSrc: Infrared   Weight: 237 lb 9.6 oz (107.8 kg)   Height: 6' 1\" (1.854 m)       PHYSICAL EXAM  Temp 98.1 °F (36.7 °C) (Infrared)   Ht 6' 1\" (1.854 m)   Wt 237 lb 9.6 oz (107.8 kg)   BMI 31.35 kg/m²     GENERAL: No Acute Distress, Alert and Oriented, no hoarseness  EYES: EOMI, Anti-icteric  NOSE: Splints removed, no active hemorrhagegood nasal airflow bilaterally, no septal perforation or dorsal nasal collapse  EARS: Normal external appearance; on portable otomicroscopy:  -Right ear: External auditory canal without stenosis, tympanic membrane clear, no middle ear effusions or retractions  -Left ear: External auditory canal without stenosis, tympanic membrane clear, no middle ear effusions or retractions  FACE: 1/6 House-Brackmann Scale, symmetric, sensation equal bilaterally  ORAL CAVITY: No masses or lesions palpated, uvula is midline, moist mucous membranes, 1+ tonsils, good dentition  -Dental mirror exam: Base of tongue with no masses, uvula anatomically normal   NECK: Normal range of motion, no thyromegaly, trachea is midline, no lymphadenopathy, no neck masses, no crepitus  CHEST: Normal respiratory effort, no retractions, breathing comfortably  SKIN: No rashes, normal appearing skin, no evidence of skin lesions/tumors  NEURO: CN 2, 3, 4, 5, 6, 7, 11, 12 intact bilaterally     Procedure     Prior images:                Assessment and Plan     1. Chronic cough  Patient with chronic cough which we attributed to laryngopharyngeal reflux. 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 Following surgery, restart beclomethasone inhalers, and restart them if he continues to have chronic cough    2. Nasal congestion, septal deviation, inferior turbinate hypertrophy  Status post septoplasty, inferior turbinate reduction and outfractureimproved nasal airflow especially to the left nasal passage, will see him back in 3-4 weeks    3. 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 Himasnhu Gutierrez MD  [address] [place] [address],[address]  Department of Otolaryngology/Head and Neck Surgery  10/21/21    I have performed a head and neck physical exam personally or was physically present during the key or critical portions of the service. Medical Decision Making:   The following items were considered in medical decision making:  Independent review of images  Review / order clinical lab tests  Review / order radiology tests  Decision to obtain old records  Review and summation of old records as accessed through Kristina (a summary of my findings in these old records: None)     Portions of this note were dictated using Dragon.  There may be linguistic errors secondary to the use of this program.

## 2021-10-21 NOTE — PATIENT INSTRUCTIONS
-Continue flonase, salt water sprays for nasal congestion  -Call ENT clinic with any further bleeding, increased nasal pain, difficulty breathing through the nose

## 2021-11-17 ENCOUNTER — TELEPHONE (OUTPATIENT)
Dept: PULMONOLOGY | Age: 66
End: 2021-11-17

## 2021-11-17 DIAGNOSIS — G47.33 OSA (OBSTRUCTIVE SLEEP APNEA): Primary | ICD-10-CM

## 2021-11-17 NOTE — TELEPHONE ENCOUNTER
Patient called requesting order for PAP supplies. Orders pended. Fax to [facility]    2/11/21      Assessment:       · Mild KENDALL. Full face mask. APAP 10-14 cmH2O. Optimal compliance and efficacy upon review today. · Nasal congestion  · Chronic sleep maintenance and onset insomnia on Ambien 5 mg. Used to be on Ambien 10 mg for 10 years. Psychophysiological hyperarousal. Failed Ambien and Belsomra. Better on Trazodone now   · Essential hypertension and hypercholesterolemia followed by cardiology  · Gabapentin use      Plan:    · Continue CPAP 6-8 hrs at night and during naps. · Replacement of mask, tubing, head straps every 3-6 months or sooner if damaged. · Trial of Ipratropium nasal spray (0.03%) 2 sprays in each nostril 2-3 times/day PRN  · Follow up CPAP compliance and pressure adjustment if needed  · Cognitive behavioral therapy was discussed with patient today including sleep education, sleep restriction, stimulus control, and sleep hygiene. · Continue Trazodone 100 mg po QHS- 3 months supplies. Risks, benefits and side effects were discussed with patient. Advised to maybe try 1/2 tablet see if it works, goal is wean to lowest effective dose possible   · Avoid sedatives, alcohol and caffeinated drinks at bed time. · No driving motorized vehicles or operating heavy machinery while fatigue, drowsy or sleepy. · Weight loss is also recommended as a long-term intervention.     · Follow up in 3-6 months

## 2021-11-22 ENCOUNTER — OFFICE VISIT (OUTPATIENT)
Dept: ENT CLINIC | Age: 66
End: 2021-11-22

## 2021-11-22 VITALS — BODY MASS INDEX: 31.41 KG/M2 | TEMPERATURE: 97.8 F | WEIGHT: 237 LBS | HEIGHT: 73 IN

## 2021-11-22 DIAGNOSIS — J02.9 SORE THROAT: ICD-10-CM

## 2021-11-22 DIAGNOSIS — R05.3 CHRONIC COUGH: Primary | ICD-10-CM

## 2021-11-22 PROCEDURE — 99024 POSTOP FOLLOW-UP VISIT: CPT | Performed by: OTOLARYNGOLOGY

## 2021-11-22 NOTE — PATIENT INSTRUCTIONS
-Start saline sprays every 2 hours approximately 8 hours prior to bedtime  -At bedtime, use a dab of vaseline to the nostrils   -Use nasal saline sprays   -Use humidification on CPAP  -Send a MyChart Message in 6 weeks to discuss improvement/lack of improvement in sore throat

## 2021-11-22 NOTE — PROGRESS NOTES
[address] UP VISIT      Patient Name: Lex Seip [address] Record Number:  [id number]  Primary Care Physician:  JACE Bermudez - [facility]    ChiefComplaint     Chief Complaint   Patient presents with    Follow-up     Nasal congestion       History of Present Illness     Bianka Schreiber is an 63 y.o. male history of nasal congestion and chronic cough.     Cough has been ongoing for the past 10 years - is dry, intermittent throughout the day, non-productive, with no defined exacerbating triggers or alleviating factors. He believes his throat is always dry, and occasionally taking a throat lozenge will improve his dry throat and may help his cough. Prior use of lisinopril, however his primary care provider discontinued this 02/2020 and he continues to have cough. Occasional heartburn depending on diet (with spicy foods), but otherwise no hoarseness, dysphagia, odynophagia, dyspnea, hoarseness.     Also with nasal congestion, feels that his nose closes up when supine. No rhinorrhea, post-nasal drip, Has KENDALL, on CPAP machine. Has been on fluticasone for 2 weeks prior to bedtime (believes this helps initially)     Has history of elevated fasting glucose but most recent BMP was normal (glucose 110). Takes carvedilol for HTN. Hemoglobin A1c 5.4 on 08/2020. No history of head and neck imaging, sinus surgery or nasal trauma.     No history of tobacco use, 2 cups of coffee daily, soda 1/week, spicy foods 2-3x/month. Minimal use of water daily. Interval History 9/24/2020: Since last visit he has been using omeprazole 40 mg twice daily as prescribed. Continues to drink 2 cups of coffee daily, 1 soda a week and uses spicy foods. Has increased hydration, although is concerned that this may be leading to him going to the bathroom more and in the middle of the night.     His chronic cough has resolved no longer states nonproductive, intermittent cough; occasional throat dryness, no water brash, hoarseness or chest burning. He continues to have nasal congestion every night. Goes to bed at approximately 930, and by about 11:30 in the morning he typically removes his CPAP as he feels he cannot breathe through his nose. No postnasal drip, anterior rhinorrhea, nasal pain, epistaxis. Has been using fluticasone as prescribed without improvement in symptoms. Interval History 10/26/2020: No improvement in nasal congestion with azelastine and flonase. Continues to have nasal congestion every night several hours after bedtime, has attempted elevating the head of his bed with no improvement. Interval History 12/3/2020: Patient has been using the beclomethasone inhaler twice daily during the weekdays, 3 times daily on weekends for the past month. States significant improvement in chronic cough; has also been using antireflux medications (which he states had no improvement). Denies any hoarse voice, dysphagia/odynophagia. Interval History 4/14/2021: Continues to have improvement in chronic cough with beclomethasone inhaler. However, states significant nasal congestion every night several hours after bedtime, no improvement with ipratropium bromide or head elevation    Interval history 11/22/2021: Concern for sore throat, states it is due to mouth breathing, approximately 60% better since septoplasty.     Past Medical History     Past Medical History:   Diagnosis Date    Deviated septum     Elevated fasting glucose     Hyperlipidemia     Hypertension     Insomnia     Sleep apnea     CPAP    Supraglottic mass        Past Surgical History     Past Surgical History:   Procedure Laterality Date    COLONOSCOPY  11/20/13   Philomena Safer HERNIA REPAIR  2006    [facility]  2004    SEPTOPLASTY N/A 10/15/2021    SEPTOPLASTY performed by Lilia Correia MD at [facility] [address] 10/15/2021    DIRECT LARYNGOSCOPY WITH BIOPSY AND USE OF OPERATING TELESCOPE, SEPTOPLASTY, BILATERAL INFERIOR TURBINATE REDUCTION AND OUTFRACTURE performed by Kati Clarke MD at [facility]         Family History     Family History   Problem Relation Age of Onset    Diabetes Mother     Parkinsonism Father        Social History     Social History     Tobacco Use    Smoking status: Never Smoker    Smokeless tobacco: Never Used   Vaping Use    Vaping Use: Never used   Substance Use Topics    Alcohol use: Not Currently     Comment: beer every 1-2 weeks    Drug use: No        Allergies     Allergies   Allergen Reactions    Codeine Rash    Penicillins Rash       Medications     Current Outpatient Medications   Medication Sig Dispense Refill    acetaminophen (TYLENOL) 500 MG tablet Take 1 tablet by mouth 4 times daily as needed for Pain 120 tablet 0    ibuprofen (ADVIL;MOTRIN) 600 MG tablet Take 1 tablet by mouth 4 times daily as needed for Pain 120 tablet 0    ondansetron (ZOFRAN-ODT) 4 MG disintegrating tablet Take 1 tablet by mouth 3 times daily as needed for Nausea or Vomiting 21 tablet 0    mupirocin (BACTROBAN) 2 % ointment Apply to the nostril stitches three times daily. 22 g 1    sodium chloride (ALTAMIST SPRAY) 0.65 % nasal spray 1 spray by Nasal route every 2 hours (while awake) 14 mL 5    gabapentin (NEURONTIN) 300 MG capsule Take 1 capsule by mouth daily.  (Patient taking differently: Take 300 mg by mouth every evening. ) 90 capsule 3    traZODone (DESYREL) 100 MG tablet TAKE 1 TABLET NIGHTLY (Patient taking differently: Take 100 mg by mouth nightly TAKE 1 TABLET NIGHTLY) 90 tablet 3    lisinopril-hydroCHLOROthiazide (PRINZIDE;ZESTORETIC) 20-12.5 MG per tablet TAKE 2 TABLETS BY MOUTH EVERY MORNING (Patient taking differently: Take 1 tablet by mouth 2 times daily TAKE 2 TABLETS BY MOUTH EVERY MORNING) 180 tablet 3    atorvastatin (LIPITOR) 40 MG tablet Take 1 tablet by mouth daily 90 tablet 3     No current facility-administered medications for this visit.        Review of Systems     REVIEW OF SYSTEMS  The following systems were reviewed and revealed the following in addition to any already discussed in the HPI:    CONSTITUTIONAL: No weight loss, no fever, no night sweats, no chills  EYES: no vision changes, no blurry vision  EARS: no changes in hearing, no otalgia  NOSE: no epistaxis, no rhinorrhea  RESPIRATORY: No difficulty breathing, no shortness of breath  CV: no chest pain, no peripheral vascular disease  HEME: No coagulation disorder, no bleeding disorder  NEURO: No TIA or stroke-like symptoms  SKIN: No new rashes in the head and neck, no recent skin cancers  MOUTH: Delete no new ulcers, no recent teeth infections  GASTROINTESTINAL: No diarrhea, stomach pain  PSYCH: No anxiety, no depression    PhysicalExam     Vitals:    11/22/21 1504   Temp: 97.8 °F (36.6 °C)   Weight: 237 lb (107.5 kg)   Height: 6' 1\" (1.854 m)       PHYSICAL EXAM  Temp 97.8 °F (36.6 °C)   Ht 6' 1\" (1.854 m)   Wt 237 lb (107.5 kg)   BMI 31.27 kg/m²     GENERAL: No Acute Distress, Alert and Oriented, no hoarseness  EYES: EOMI, Anti-icteric  NOSE: Good nasal airflow bilaterally, no septal perforation or dorsal nasal collapse  EARS: Normal external appearance; on portable otomicroscopy:  -Right ear: External auditory canal without stenosis, tympanic membrane clear, no middle ear effusions or retractions  -Left ear: External auditory canal without stenosis, tympanic membrane clear, no middle ear effusions or retractions  FACE: 1/6 House-Brackmann Scale, symmetric, sensation equal bilaterally  ORAL CAVITY: No masses or lesions palpated, uvula is midline, moist mucous membranes, 1+ tonsils, good dentition  -Dental mirror exam: Base of tongue with no masses, uvula anatomically normal   NECK: Normal range of motion, no thyromegaly, trachea is midline, no lymphadenopathy, no neck masses, no crepitus  CHEST: Normal respiratory effort, no retractions, breathing comfortably  SKIN: No rashes, normal appearing skin, no evidence of skin lesions/tumors  NEURO: CN 2, 3, 4, 5, 6, 7, 11, 12 intact bilaterally     Procedure     Prior images:                Assessment and Plan     1. Chronic cough  Patient with chronic cough which we attributed to laryngopharyngeal reflux. 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 Following surgery, restart beclomethasone inhalers, and restart them if he continues to have chronic cough    2. Sore throat  Suspect that this is due to nasal/oral drynesswe will start him on nasal saline sprays prior to sleep; has KENDALL, uses CPAP but has humidification on this. We will see him back in 6 months, and in the interval we will manage him via MyChart -we will consider Flonase, azelastine if postnasal drip appears to be the issue. Return in about 6 months (around 5/22/2022). Tejas Starr MD  [facility]  Department of Otolaryngology/Head and Neck Surgery  11/22/21    I have performed a head and neck physical exam personally or was physically present during the key or critical portions of the service. Medical Decision Making: The following items were considered in medical decision making:  Independent review of images  Review / order clinical lab tests  Review / order radiology tests  Decision to obtain old records  Review and summation of old records as accessed through [facility] (a summary of my findings in these old records: None)     Portions of this note were dictated using Dragon.  There may be linguistic errors secondary to the use of this program.

## 2022-01-13 ENCOUNTER — TELEPHONE (OUTPATIENT)
Dept: ENT CLINIC | Age: 67
End: 2022-01-13

## 2022-01-13 DIAGNOSIS — R09.81 NASAL CONGESTION: Primary | ICD-10-CM

## 2022-01-13 RX ORDER — ECHINACEA PURPUREA EXTRACT 125 MG
1 TABLET ORAL
Qty: 60 ML | Refills: 5 | Status: SHIPPED | OUTPATIENT
Start: 2022-01-13 | End: 2022-02-14

## 2022-01-13 NOTE — TELEPHONE ENCOUNTER
Had four refills left but his insurance changed and he is no longer allowed to use [facility]. So they told him to have Dr. Norman Room place a new prescription.     Medication: sodium chloride (ALTAMIST SPRAY)    If we could send a new prescription to   [facility]  Phone: [phone]  Fax: [phone]

## 2022-02-14 ENCOUNTER — OFFICE VISIT (OUTPATIENT)
Dept: FAMILY MEDICINE CLINIC | Age: 67
End: 2022-02-14
Payer: COMMERCIAL

## 2022-02-14 VITALS
OXYGEN SATURATION: 97 % | BODY MASS INDEX: 31.66 KG/M2 | SYSTOLIC BLOOD PRESSURE: 130 MMHG | DIASTOLIC BLOOD PRESSURE: 86 MMHG | HEART RATE: 84 BPM | WEIGHT: 240 LBS

## 2022-02-14 DIAGNOSIS — G89.29 CHRONIC LEFT SHOULDER PAIN: ICD-10-CM

## 2022-02-14 DIAGNOSIS — I10 ESSENTIAL HYPERTENSION, BENIGN: ICD-10-CM

## 2022-02-14 DIAGNOSIS — Z00.00 PHYSICAL EXAM: Primary | ICD-10-CM

## 2022-02-14 DIAGNOSIS — Z13.1 SCREENING FOR DIABETES MELLITUS: ICD-10-CM

## 2022-02-14 DIAGNOSIS — E78.2 MIXED HYPERLIPIDEMIA: ICD-10-CM

## 2022-02-14 DIAGNOSIS — Z13.220 SCREENING FOR HYPERLIPIDEMIA: ICD-10-CM

## 2022-02-14 DIAGNOSIS — M25.512 CHRONIC LEFT SHOULDER PAIN: ICD-10-CM

## 2022-02-14 LAB
A/G RATIO: 1.9 (ref 1.1–2.2)
ALBUMIN SERPL-MCNC: 4.5 G/DL (ref 3.4–5)
ALP BLD-CCNC: 90 U/L (ref 40–129)
ALT SERPL-CCNC: 45 U/L (ref 10–40)
ANION GAP SERPL CALCULATED.3IONS-SCNC: 13 MMOL/L (ref 3–16)
AST SERPL-CCNC: 22 U/L (ref 15–37)
BASOPHILS ABSOLUTE: 0 K/UL (ref 0–0.2)
BASOPHILS RELATIVE PERCENT: 0.7 %
BILIRUB SERPL-MCNC: 0.6 MG/DL (ref 0–1)
BUN BLDV-MCNC: 18 MG/DL (ref 7–20)
CALCIUM SERPL-MCNC: 9.3 MG/DL (ref 8.3–10.6)
CHLORIDE BLD-SCNC: 103 MMOL/L (ref 99–110)
CHOLESTEROL, TOTAL: 129 MG/DL (ref 0–199)
CO2: 26 MMOL/L (ref 21–32)
CREAT SERPL-MCNC: 0.8 MG/DL (ref 0.8–1.3)
EOSINOPHILS ABSOLUTE: 0.3 K/UL (ref 0–0.6)
EOSINOPHILS RELATIVE PERCENT: 4.9 %
GFR AFRICAN AMERICAN: >60
GFR NON-AFRICAN AMERICAN: >60
GLUCOSE BLD-MCNC: 105 MG/DL (ref 70–99)
HCT VFR BLD CALC: 43.9 % (ref 40.5–52.5)
HDLC SERPL-MCNC: 35 MG/DL (ref 40–60)
HEMOGLOBIN: 15 G/DL (ref 13.5–17.5)
LDL CHOLESTEROL CALCULATED: 66 MG/DL
LYMPHOCYTES ABSOLUTE: 1.6 K/UL (ref 1–5.1)
LYMPHOCYTES RELATIVE PERCENT: 24.7 %
MCH RBC QN AUTO: 28.9 PG (ref 26–34)
MCHC RBC AUTO-ENTMCNC: 34.2 G/DL (ref 31–36)
MCV RBC AUTO: 84.5 FL (ref 80–100)
MONOCYTES ABSOLUTE: 0.5 K/UL (ref 0–1.3)
MONOCYTES RELATIVE PERCENT: 7.4 %
NEUTROPHILS ABSOLUTE: 3.9 K/UL (ref 1.7–7.7)
NEUTROPHILS RELATIVE PERCENT: 62.3 %
PDW BLD-RTO: 13.8 % (ref 12.4–15.4)
PLATELET # BLD: 218 K/UL (ref 135–450)
PMV BLD AUTO: 8 FL (ref 5–10.5)
POTASSIUM SERPL-SCNC: 4.2 MMOL/L (ref 3.5–5.1)
RBC # BLD: 5.19 M/UL (ref 4.2–5.9)
SODIUM BLD-SCNC: 142 MMOL/L (ref 136–145)
TOTAL PROTEIN: 6.9 G/DL (ref 6.4–8.2)
TRIGL SERPL-MCNC: 139 MG/DL (ref 0–150)
VLDLC SERPL CALC-MCNC: 28 MG/DL
WBC # BLD: 6.3 K/UL (ref 4–11)

## 2022-02-14 PROCEDURE — 90471 IMMUNIZATION ADMIN: CPT | Performed by: NURSE PRACTITIONER

## 2022-02-14 PROCEDURE — 90732 PPSV23 VACC 2 YRS+ SUBQ/IM: CPT | Performed by: NURSE PRACTITIONER

## 2022-02-14 PROCEDURE — 99397 PER PM REEVAL EST PAT 65+ YR: CPT | Performed by: NURSE PRACTITIONER

## 2022-02-14 PROCEDURE — 36415 COLL VENOUS BLD VENIPUNCTURE: CPT | Performed by: NURSE PRACTITIONER

## 2022-02-14 RX ORDER — GABAPENTIN 300 MG/1
300 CAPSULE ORAL EVERY EVENING
Qty: 90 CAPSULE | Refills: 3 | Status: SHIPPED | OUTPATIENT
Start: 2022-02-14 | End: 2023-02-14

## 2022-02-14 RX ORDER — LISINOPRIL AND HYDROCHLOROTHIAZIDE 20; 12.5 MG/1; MG/1
TABLET ORAL
Qty: 180 TABLET | Refills: 3 | Status: SHIPPED | OUTPATIENT
Start: 2022-02-14

## 2022-02-14 RX ORDER — ATORVASTATIN CALCIUM 40 MG/1
40 TABLET, FILM COATED ORAL DAILY
Qty: 90 TABLET | Refills: 3 | Status: SHIPPED | OUTPATIENT
Start: 2022-02-14

## 2022-02-14 RX ORDER — TRAZODONE HYDROCHLORIDE 100 MG/1
TABLET ORAL
Qty: 90 TABLET | Refills: 3 | Status: SHIPPED | OUTPATIENT
Start: 2022-02-14

## 2022-02-14 ASSESSMENT — ENCOUNTER SYMPTOMS
DIARRHEA: 0
SHORTNESS OF BREATH: 0
WHEEZING: 0
ABDOMINAL PAIN: 0
SORE THROAT: 0
CONSTIPATION: 0

## 2022-02-14 ASSESSMENT — PATIENT HEALTH QUESTIONNAIRE - PHQ9
SUM OF ALL RESPONSES TO PHQ QUESTIONS 1-9: 0
2. FEELING DOWN, DEPRESSED OR HOPELESS: 0
SUM OF ALL RESPONSES TO PHQ9 QUESTIONS 1 & 2: 0
SUM OF ALL RESPONSES TO PHQ QUESTIONS 1-9: 0
1. LITTLE INTEREST OR PLEASURE IN DOING THINGS: 0

## 2022-02-14 NOTE — PROGRESS NOTES
Patient ID: Ronal Vuong is a 74 y.o. male who presents today for a Physical Exam.      HPI  Here for physical exam   Retired last month- has been going to the gym     Past Medical History:   Diagnosis Date    Deviated septum     Elevated fasting glucose     Hyperlipidemia     Hypertension     Insomnia     Sleep apnea     CPAP    Supraglottic mass        Past Surgical History:   Procedure Laterality Date    COLONOSCOPY  11/20/13    HERNIA REPAIR  2006    [facility]  2004    SEPTOPLASTY N/A 10/15/2021    SEPTOPLASTY performed by Mick Chavez MD at [address] [place] [address] N/A 10/15/2021    DIRECT LARYNGOSCOPY WITH BIOPSY AND USE OF OPERATING TELESCOPE, SEPTOPLASTY, BILATERAL INFERIOR TURBINATE REDUCTION AND OUTFRACTURE performed by Mick Chavez MD at [facility]         Family History   Problem Relation Age of Onset    Diabetes Mother     Parkinsonism Father           Social History     Socioeconomic History    Marital status:      Spouse name: None    Number of children: None    Years of education: None    Highest education level: None   Occupational History    None   Tobacco Use    Smoking status: Never Smoker    Smokeless tobacco: Never Used   Vaping Use    Vaping Use: Never used   Substance and Sexual Activity    Alcohol use: Not Currently     Comment: beer every 1-2 weeks    Drug use: No    Sexual activity: Yes     Partners: Female   Other Topics Concern    None   Social History Narrative    None     Social Determinants of Health     Financial Resource Strain:     Difficulty of Paying Living Expenses: Not on file   Food Insecurity:     Worried About Running Out of Food in the Last Year: Not on file    Dorian of Food in the Last Year: Not on file   Transportation Needs:     Lack of Transportation (Medical): Not on file    Lack of Transportation (Non-Medical):  Not on file   Physical Activity:     Days of Exercise per Week: Not on file    Eyes: Negative for visual disturbance. Respiratory: Negative for shortness of breath and wheezing. Cardiovascular: Negative for chest pain and palpitations. Gastrointestinal: Negative for abdominal pain, constipation and diarrhea. Genitourinary: Negative. Musculoskeletal: Negative. Skin: Negative. Allergic/Immunologic: Negative for food allergies. Neurological: Negative for dizziness and headaches. Psychiatric/Behavioral: Negative for dysphoric mood. The patient is not nervous/anxious. Physical Exam  Vitals and nursing note reviewed. Constitutional:       Appearance: Normal appearance. He is well-developed. HENT:      Head: Normocephalic and atraumatic. Right Ear: Tympanic membrane and external ear normal.      Left Ear: Tympanic membrane and external ear normal.      Nose: Nose normal.      Mouth/Throat:      Pharynx: No oropharyngeal exudate or posterior oropharyngeal erythema. Eyes:      Conjunctiva/sclera: Conjunctivae normal.   Cardiovascular:      Rate and Rhythm: Normal rate and regular rhythm. Heart sounds: Normal heart sounds. No murmur heard. Pulmonary:      Effort: Pulmonary effort is normal. No respiratory distress. Breath sounds: Normal breath sounds. No wheezing or rales. Abdominal:      General: Bowel sounds are normal. There is no distension. Palpations: Abdomen is soft. Tenderness: There is no abdominal tenderness. There is no rebound. Musculoskeletal:         General: Normal range of motion. Cervical back: Normal range of motion. Lymphadenopathy:      Cervical: No cervical adenopathy. Skin:     General: Skin is warm and dry. Neurological:      General: No focal deficit present. Mental Status: He is alert and oriented to person, place, and time. Deep Tendon Reflexes: Reflexes are normal and symmetric.    Psychiatric:         Mood and Affect: Mood normal.         Behavior: Behavior normal.         Thought Content: Thought content normal.         Judgment: Judgment normal.         Assessment:  Encounter Diagnoses   Name Primary?  Screening for hyperlipidemia     Screening for diabetes mellitus     Physical exam Yes    Mixed hyperlipidemia     Chronic left shoulder pain     Essential hypertension, benign        Plan:  1. Screening for hyperlipidemia    - Lipid Panel    2. Screening for diabetes mellitus    - Hemoglobin A1C    3. Physical exam    - CBC Auto Differential  - Comprehensive Metabolic Panel  - Hemoglobin A1C  - Lipid Panel    4. Mixed hyperlipidemia    - atorvastatin (LIPITOR) 40 MG tablet; Take 1 tablet by mouth daily  Dispense: 90 tablet; Refill: 3    5. Chronic left shoulder pain    - gabapentin (NEURONTIN) 300 MG capsule; Take 1 capsule by mouth every evening. Dispense: 90 capsule; Refill: 3    6. Essential hypertension, benign    - lisinopril-hydroCHLOROthiazide (PRINZIDE;ZESTORETIC) 20-12.5 MG per tablet; TAKE 2 TABLETS BY MOUTH EVERY MORNING  Dispense: 180 tablet; Refill: 3            No follow-ups on file.

## 2022-02-15 LAB
ESTIMATED AVERAGE GLUCOSE: 111.2 MG/DL
HBA1C MFR BLD: 5.5 %

## 2022-05-13 ENCOUNTER — TELEPHONE (OUTPATIENT)
Dept: FAMILY MEDICINE CLINIC | Age: 67
End: 2022-05-13

## 2022-05-13 NOTE — TELEPHONE ENCOUNTER
Pt called in with flank back pain, started Monday. Believes it might be a kidney stone, he has never had one but his father did. On pain scale he says its a 7 out of 10, even with his back brace that he wears to protect his back when lifting. Requesting appt asap, PCP out today, please advise.

## 2022-05-19 ENCOUNTER — OFFICE VISIT (OUTPATIENT)
Dept: FAMILY MEDICINE CLINIC | Age: 67
End: 2022-05-19
Payer: COMMERCIAL

## 2022-05-19 VITALS
SYSTOLIC BLOOD PRESSURE: 128 MMHG | HEART RATE: 68 BPM | DIASTOLIC BLOOD PRESSURE: 78 MMHG | BODY MASS INDEX: 31.14 KG/M2 | OXYGEN SATURATION: 98 % | HEIGHT: 73 IN | WEIGHT: 235 LBS

## 2022-05-19 DIAGNOSIS — M54.50 ACUTE LEFT-SIDED LOW BACK PAIN WITHOUT SCIATICA: Primary | ICD-10-CM

## 2022-05-19 PROCEDURE — 99213 OFFICE O/P EST LOW 20 MIN: CPT | Performed by: NURSE PRACTITIONER

## 2022-05-19 RX ORDER — METHYLPREDNISOLONE 4 MG/1
TABLET ORAL
Qty: 21 TABLET | Refills: 0 | Status: SHIPPED | OUTPATIENT
Start: 2022-05-19 | End: 2022-09-22

## 2022-05-19 SDOH — ECONOMIC STABILITY: FOOD INSECURITY: WITHIN THE PAST 12 MONTHS, YOU WORRIED THAT YOUR FOOD WOULD RUN OUT BEFORE YOU GOT MONEY TO BUY MORE.: NEVER TRUE

## 2022-05-19 SDOH — ECONOMIC STABILITY: FOOD INSECURITY: WITHIN THE PAST 12 MONTHS, THE FOOD YOU BOUGHT JUST DIDN'T LAST AND YOU DIDN'T HAVE MONEY TO GET MORE.: NEVER TRUE

## 2022-05-19 ASSESSMENT — ENCOUNTER SYMPTOMS
BACK PAIN: 1
RESPIRATORY NEGATIVE: 1
GASTROINTESTINAL NEGATIVE: 1

## 2022-05-19 ASSESSMENT — SOCIAL DETERMINANTS OF HEALTH (SDOH): HOW HARD IS IT FOR YOU TO PAY FOR THE VERY BASICS LIKE FOOD, HOUSING, MEDICAL CARE, AND HEATING?: NOT HARD AT ALL

## 2022-05-19 NOTE — PROGRESS NOTES
Patient: Migue Lepe is a 74 y.o. male who presents today with the following Chief Complaint(s):  Chief Complaint   Patient presents with    Lower Back Pain     Woke up 7 days ago with aching pain in lower back. Using ice, heat, and a back brace with no relief. Assessment:  Encounter Diagnosis   Name Primary?  Acute left-sided low back pain without sciatica Yes       Plan:  1. Acute left-sided low back pain without sciatica  Will treat with steroid pack and follow up with [facility] back and spine physical therapy. - [facility] - Physical Therapy  - methylPREDNISolone (MEDROL, BRAYAN,) 4 MG tablet; Take 6 tablets all at once on day 1, 5 tablets on day 2, 4 tablets on day 3, 3 tablets on day 4 and 2 tablets on day 5 and 1 tablet on day 6. Dispense: 21 tablet; Refill: 0      Back Pain  This is a new problem. The problem occurs constantly. The pain is present in the lumbar spine. Quality: sharp. The pain does not radiate. The pain is at a severity of 5/10. The pain is moderate. The pain is the same all the time. The symptoms are aggravated by bending, position, standing and sitting. He has tried NSAIDs for the symptoms. The treatment provided no relief. Current Outpatient Medications   Medication Sig Dispense Refill    methylPREDNISolone (MEDROL, BRAYAN,) 4 MG tablet Take 6 tablets all at once on day 1, 5 tablets on day 2, 4 tablets on day 3, 3 tablets on day 4 and 2 tablets on day 5 and 1 tablet on day 6. 21 tablet 0    atorvastatin (LIPITOR) 40 MG tablet Take 1 tablet by mouth daily 90 tablet 3    gabapentin (NEURONTIN) 300 MG capsule Take 1 capsule by mouth every evening.  90 capsule 3    traZODone (DESYREL) 100 MG tablet TAKE 1 TABLET NIGHTLY 90 tablet 3    lisinopril-hydroCHLOROthiazide (PRINZIDE;ZESTORETIC) 20-12.5 MG per tablet TAKE 2 TABLETS BY MOUTH EVERY MORNING 180 tablet 3    ibuprofen (ADVIL;MOTRIN) 600 MG tablet Take 1 tablet by mouth 4 times daily as needed for Pain 120 tablet 0     No current facility-administered medications for this visit. Patient's past medical history, surgical history, family history, medications,and allergies  were all reviewed and updated as appropriate today. Review of Systems   Constitutional: Negative. HENT: Negative. Respiratory: Negative. Cardiovascular: Negative. Gastrointestinal: Negative. Musculoskeletal: Positive for back pain. Neurological: Negative. Psychiatric/Behavioral: Negative. Physical Exam  Vitals reviewed. Musculoskeletal:      Thoracic back: Normal range of motion. Lumbar back: Normal range of motion. Back:    Skin:     General: Skin is warm and dry. Neurological:      Mental Status: He is alert. Vitals:    05/19/22 0909   BP: 128/78   Pulse: 68   SpO2: 98%       This chart was generated using the Dragon dictation system. I created this record but it may contain dictation errors due to the limitation of the software.

## 2022-08-11 RX ORDER — BECLOMETHASONE DIPROPIONATE HFA 80 UG/1
AEROSOL, METERED RESPIRATORY (INHALATION)
Refills: 5 | OUTPATIENT
Start: 2022-08-11

## 2022-08-11 RX ORDER — FAMOTIDINE 40 MG/1
TABLET, FILM COATED ORAL
Refills: 3 | OUTPATIENT
Start: 2022-08-11

## 2022-09-22 ENCOUNTER — OFFICE VISIT (OUTPATIENT)
Dept: FAMILY MEDICINE CLINIC | Age: 67
End: 2022-09-22
Payer: COMMERCIAL

## 2022-09-22 VITALS
OXYGEN SATURATION: 96 % | BODY MASS INDEX: 30.48 KG/M2 | SYSTOLIC BLOOD PRESSURE: 120 MMHG | HEART RATE: 82 BPM | WEIGHT: 231 LBS | DIASTOLIC BLOOD PRESSURE: 84 MMHG

## 2022-09-22 DIAGNOSIS — E78.2 MIXED HYPERLIPIDEMIA: ICD-10-CM

## 2022-09-22 DIAGNOSIS — B35.1 FUNGAL TOENAIL INFECTION: ICD-10-CM

## 2022-09-22 DIAGNOSIS — I10 ESSENTIAL HYPERTENSION: Primary | ICD-10-CM

## 2022-09-22 DIAGNOSIS — F51.01 PRIMARY INSOMNIA: ICD-10-CM

## 2022-09-22 DIAGNOSIS — N52.9 ERECTILE DYSFUNCTION, UNSPECIFIED ERECTILE DYSFUNCTION TYPE: ICD-10-CM

## 2022-09-22 PROCEDURE — 1123F ACP DISCUSS/DSCN MKR DOCD: CPT | Performed by: NURSE PRACTITIONER

## 2022-09-22 PROCEDURE — 90694 VACC AIIV4 NO PRSRV 0.5ML IM: CPT | Performed by: NURSE PRACTITIONER

## 2022-09-22 PROCEDURE — 90471 IMMUNIZATION ADMIN: CPT | Performed by: NURSE PRACTITIONER

## 2022-09-22 PROCEDURE — 99214 OFFICE O/P EST MOD 30 MIN: CPT | Performed by: NURSE PRACTITIONER

## 2022-09-22 RX ORDER — SILDENAFIL CITRATE 20 MG/1
TABLET ORAL
Qty: 30 TABLET | Refills: 2 | Status: SHIPPED | OUTPATIENT
Start: 2022-09-22

## 2022-09-22 ASSESSMENT — ENCOUNTER SYMPTOMS
WHEEZING: 0
SHORTNESS OF BREATH: 0

## 2022-09-22 NOTE — PROGRESS NOTES
Patient: Ramsey Nicole is a 74 y.o. male who presents today with the following Chief Complaint(s):  Chief Complaint   Patient presents with    6 Month Follow-Up     htn         HPI  Here for 6 month follow up on HTN, cholesterol and insomnia- also wants to discuss toenail fungus on both big toes    Toenail fungus-Both big toes- left worse than right toe- has tried oral medication in the past and tried the topical in the past and did not help. Oral medication made him tired. HTN: stable on lisinopril 20-12.5mg daily- no chest pain, SOB, headaches or dizziness    Insomnia: trazodone 100mg nightly- working well     High cholesterol: stable on lipitor 40mg daily - no side effects     ED: has had ED for years- tried one of the oral meds in the past- wants to try again and if that doesn't help will see specialist     Current Outpatient Medications   Medication Sig Dispense Refill    sildenafil (REVATIO) 20 MG tablet May take up to 5 pills daily PRN 30 tablet 2    atorvastatin (LIPITOR) 40 MG tablet Take 1 tablet by mouth daily 90 tablet 3    gabapentin (NEURONTIN) 300 MG capsule Take 1 capsule by mouth every evening. 90 capsule 3    traZODone (DESYREL) 100 MG tablet TAKE 1 TABLET NIGHTLY 90 tablet 3    lisinopril-hydroCHLOROthiazide (PRINZIDE;ZESTORETIC) 20-12.5 MG per tablet TAKE 2 TABLETS BY MOUTH EVERY MORNING 180 tablet 3    ibuprofen (ADVIL;MOTRIN) 600 MG tablet Take 1 tablet by mouth 4 times daily as needed for Pain 120 tablet 0     No current facility-administered medications for this visit. Patient's past medical history, surgical history, family history, medications,  andallergies  were all reviewed and updated as appropriate today. Review of Systems   Constitutional:  Negative for chills and fever. Respiratory:  Negative for shortness of breath and wheezing. Cardiovascular:  Negative for chest pain and palpitations.    Skin:         Toenail fungus both big toes   Neurological:  Negative for dizziness and headaches. Psychiatric/Behavioral:  Negative for sleep disturbance (trazodone helps). Physical Exam  Vitals and nursing note reviewed. Constitutional:       Appearance: Normal appearance. He is well-developed. HENT:      Head: Normocephalic and atraumatic. Right Ear: External ear normal.      Left Ear: External ear normal.      Nose: Nose normal.      Mouth/Throat:      Pharynx: No oropharyngeal exudate or posterior oropharyngeal erythema. Eyes:      Conjunctiva/sclera: Conjunctivae normal.   Cardiovascular:      Rate and Rhythm: Normal rate and regular rhythm. Heart sounds: Normal heart sounds. No murmur heard. Pulmonary:      Effort: Pulmonary effort is normal. No respiratory distress. Breath sounds: Normal breath sounds. No wheezing or rales. Musculoskeletal:         General: Normal range of motion. Cervical back: Normal range of motion. Lymphadenopathy:      Cervical: No cervical adenopathy. Skin:     General: Skin is warm and dry. Comments: Thick toenails on both big toes, cracked    Neurological:      General: No focal deficit present. Mental Status: He is alert and oriented to person, place, and time. Deep Tendon Reflexes: Reflexes are normal and symmetric. Psychiatric:         Mood and Affect: Mood normal.         Behavior: Behavior normal.         Thought Content: Thought content normal.         Judgment: Judgment normal.     Vitals:    09/22/22 0833   BP: 120/84   Pulse: 82   SpO2: 96%       Assessment:  Encounter Diagnoses   Name Primary? Fungal toenail infection     Essential hypertension Yes    Mixed hyperlipidemia     Primary insomnia     Erectile dysfunction, unspecified erectile dysfunction type        Plan:  1. Fungal toenail infection    - CHARLIE - Lurdes Tomlinson, DPM, Podiatry, [facility]    2. Essential hypertension  Stable BP- continue lisinopril 20-12.5mg once daily    3.  Mixed hyperlipidemia  Stable - continue lipitor 40mg daily     4. Primary insomnia  Stable- continue trazodone 100mg nightly when needed     5. Erectile dysfunction, unspecified erectile dysfunction type  Will try this- if not helpful will need to see specialist     - sildenafil (REVATIO) 20 MG tablet; May take up to 5 pills daily PRN  Dispense: 30 tablet; Refill: 2      No follow-ups on file.

## 2022-09-26 ENCOUNTER — TELEPHONE (OUTPATIENT)
Dept: ADMINISTRATIVE | Age: 67
End: 2022-09-26

## 2022-09-26 NOTE — TELEPHONE ENCOUNTER
Submitted PA for Sildenafil Citrate 20MG tablets, Key: [id number]. Medication has been DENIED. Denial letter attached. Please notify patient. Thank you.

## 2022-12-05 ENCOUNTER — TELEPHONE (OUTPATIENT)
Dept: CARDIOLOGY CLINIC | Age: 67
End: 2022-12-05

## 2022-12-05 DIAGNOSIS — I49.9 IRREGULAR HEART RATE: Primary | ICD-10-CM

## 2022-12-05 NOTE — TELEPHONE ENCOUNTER
Pt called to schedule an appt with srj. Pt is currently scheduled for 01/05/2023 [facility]. Pt stated that for the past couple of weeks he has noticed that his heart rate goes up to the 140s for roughly 3 minutes and then returns to normal. Pt notices this while working out. Pt does not have chest pain or sob.

## 2022-12-08 NOTE — TELEPHONE ENCOUNTER
Ordered echo, exercise stress test, ct ca score & 2 week event monitor. Called pt, relayed SRJ message, and scheduled pt for lab visit to place monitor.

## 2022-12-09 ENCOUNTER — TELEPHONE (OUTPATIENT)
Dept: CARDIOLOGY CLINIC | Age: 67
End: 2022-12-09

## 2022-12-09 NOTE — TELEPHONE ENCOUNTER
Monitor placed by [address] [place] [address]  Length of monitor 2 WEEK  Monitor ordered by [address]  Serial number [id number]  Kit ID 0000  Activation successful prior to pt leaving office?  Yes

## 2022-12-26 DIAGNOSIS — E78.2 MIXED HYPERLIPIDEMIA: ICD-10-CM

## 2022-12-26 DIAGNOSIS — M25.512 CHRONIC LEFT SHOULDER PAIN: ICD-10-CM

## 2022-12-26 DIAGNOSIS — G89.29 CHRONIC LEFT SHOULDER PAIN: ICD-10-CM

## 2022-12-27 RX ORDER — ATORVASTATIN CALCIUM 40 MG/1
40 TABLET, FILM COATED ORAL DAILY
Qty: 90 TABLET | Refills: 3 | Status: SHIPPED | OUTPATIENT
Start: 2022-12-27

## 2022-12-27 RX ORDER — GABAPENTIN 300 MG/1
300 CAPSULE ORAL EVERY EVENING
Qty: 90 CAPSULE | Refills: 3 | Status: SHIPPED | OUTPATIENT
Start: 2022-12-27 | End: 2023-12-22

## 2022-12-27 NOTE — TELEPHONE ENCOUNTER
Last Office Visit  -  09/22/2022  Next Office Visit  -  n/a    Last Filled  -  02/14/2022  Last UDS -  n/a  Contract -  n/a

## 2023-01-03 PROCEDURE — 93248 EXT ECG>7D<15D REV&INTERPJ: CPT | Performed by: INTERNAL MEDICINE

## 2023-01-03 NOTE — PROGRESS NOTES
[facility]   CARDIAC EVALUATION NOTE  [phone]      PCP:  JACE Almanza CNP    Reason for Consultation/Chief Complaint: new pt, >3 yrs since last OV for tachy    Subjective   History of Present Illness:  Sola Liao is a 74 y.o. patient with a history of HTN and HLD who presented with an elevated HR originally on 12/21/18. Huong Houser He was started on BP medications several years ago. His BP is elevated on exam. He states the elevated HR started prior to his exercise routine on 11/28/18. He runs a mile on the treadmill, 1 mile on elliptical and 1 mile on the bike. He reports he had an anxious sensation. He checked his HR on the treadmill and his HR was 150 prior to starting a run. He changed machines and found his HR was still 150. His HR reached 168 and he started feeling palpitations. After 20 mins the episode resolved with a HR down to 107. He had Sudafed due to a cold starting a few days prior to 11/28/18. He had a normal work out the next day. He checks his HR at home which shows a HR of 60s. He has never had a similar reaction to other medications. He has not had previous cardiac workup. Her CT calcium score from 12/26/18 was 69. Her echo from 01/17/19 showed her EF was 55% with mild bi-atrial enlargement and mild MR. Her stress test from 01/17/19 was negative for ischemia. 2/22/19 his coreg was increased due to elevated BP. He is now taking coreg 3.125 mg twice daily. He denied CP, SOB, dizziness or syncope. 5/6/19 OV with RAIN Barr for secondary follow up after increasing carvedilol for BP control. At that time his home BP had been 140/85 and no significant change despite the increase dose. Today he reports that his heart has been racing rapidly recently. This has been happening for the last 3 to 4 weeks. He states that he feels this when working out. It causes him to get anxiety when it happens and notes chest pain.  He states that he checks his BP regularly and BP has been within normal range limits. He denies sob, dizziness, syncope and edema. Past Medical History:   has a past medical history of Deviated septum, Elevated fasting glucose, Hyperlipidemia, Hypertension, Insomnia, Sleep apnea, and Supraglottic mass. Surgical History:   has a past surgical history that includes [place] tooth extraction; hernia repair (); LASIK (); Colonoscopy (13); Sinus endoscopy (N/A, 10/15/2021); and Septoplasty (N/A, 10/15/2021). Social History:   reports that he has never smoked. He has never used smokeless tobacco. He reports that he does not currently use alcohol. He reports that he does not use drugs. [employer], [employer] job    Family History:  family history includes Diabetes in his mother; Parkinsonism in his father. Brother  MI age 61. Other brother with syncope. Home Medications:  Were reviewed and are listed in nursing record and/or below  Prior to Admission medications    Medication Sig Start Date End Date Taking? Authorizing Provider   gabapentin (NEURONTIN) 300 MG capsule Take 1 capsule by mouth every evening for 360 days. 22 Yes Mook Le MD   atorvastatin (LIPITOR) 40 MG tablet TAKE 1 TABLET BY MOUTH  DAILY 22  Yes Mook Le MD   sildenafil (REVATIO) 20 MG tablet May take up to 5 pills daily PRN 22  Yes JACE De Jesus - CNP   traZODone (DESYREL) 100 MG tablet TAKE 1 TABLET NIGHTLY 22  Yes JACE De Jesus CNP   lisinopril-hydroCHLOROthiazide (PRINZIDE;ZESTORETIC) 20-12.5 MG per tablet TAKE 2 TABLETS BY MOUTH EVERY MORNING 22  Yes JACE De Jesus - CNP   ibuprofen (ADVIL;MOTRIN) 600 MG tablet Take 1 tablet by mouth 4 times daily as needed for Pain 10/15/21  Yes Partha Dacosta MD          Allergies:  Codeine and Penicillins     Review of Systems:   A 14 point review of symptoms completed.  Pertinent positives identified in the HPI, all other review of symptoms negative as below.       Objective   PHYSICAL EXAM:    Vitals:    01/05/23 0931   BP: (!) 148/86   Pulse:    SpO2:       Weight: 232 lb 6.4 oz (105.4 kg)         General Appearance:  Alert, cooperative, no distress, appears stated age   Head:  Normocephalic, without obvious abnormality, atraumatic   Eyes:  PERRL, conjunctiva/corneas clear   Nose: Nares normal, no drainage or sinus tenderness   Throat: Lips, mucosa, and tongue normal   Neck: Supple, symmetrical, trachea midline, no adenopathy, thyroid: not enlarged, symmetric, no tenderness/mass/nodules, no carotid bruit or JVD   Lungs:   Clear to auscultation bilaterally, respirations unlabored   Chest Wall:  No deformity or tenderness   Heart:  Regular rate and rhythm, S1, S2 normal, no murmur, rub or gallop   Abdomen:   Soft, non-tender, bowel sounds active all four quadrants,  no masses, no organomegaly   Extremities: Extremities normal, atraumatic, no cyanosis or edema   Pulses: 2+ and symmetric   Skin: Skin color, texture, turgor normal, no rashes or lesions   Pysch: Normal mood and affect   Neurologic: Normal gross motor and sensory exam.         Labs   CBC:   Lab Results   Component Value Date/Time    WBC 6.3 02/14/2022 08:04 AM    RBC 5.19 02/14/2022 08:04 AM    HGB 15.0 02/14/2022 08:04 AM    HCT 43.9 02/14/2022 08:04 AM    MCV 84.5 02/14/2022 08:04 AM    RDW 13.8 02/14/2022 08:04 AM     02/14/2022 08:04 AM     CMP:  Lab Results   Component Value Date/Time     02/14/2022 08:04 AM    K 4.2 02/14/2022 08:04 AM    K 4.1 10/15/2021 06:35 AM     02/14/2022 08:04 AM    CO2 26 02/14/2022 08:04 AM    BUN 18 02/14/2022 08:04 AM    CREATININE 0.8 02/14/2022 08:04 AM    GFRAA >60 02/14/2022 08:04 AM    GFRAA >60 03/25/2013 10:25 AM    AGRATIO 1.9 02/14/2022 08:04 AM    LABGLOM >60 02/14/2022 08:04 AM    GLUCOSE 105 02/14/2022 08:04 AM    PROT 6.9 02/14/2022 08:04 AM    PROT 6.9 09/24/2012 08:12 AM    CALCIUM 9.3 02/14/2022 08:04 AM BILITOT 0.6 2022 08:04 AM    ALKPHOS 90 2022 08:04 AM    AST 22 2022 08:04 AM    ALT 45 2022 08:04 AM     PT/INR:  No results found for: PTINR  HgBA1c:  Lab Results   Component Value Date    LABA1C 5.5 2022     No results found for: CKTOTAL, CKMB, CKMBINDEX, TROPONINI      Cardiac Data     Last EK18  SB HR 56. Today nsr pvc infer infarct     Echo: 19   Normal left ventricle systolic function with an estimated ejection fraction   of 55%. No regional wall motion abnormalities are seen. Normal left ventricular diastolic filling pressure. Mild bi-atrial enlargement. Mild mitral regurgitation. Systolic pulmonary artery pressure (SPAP) is normal and estimated at 24 mmHg   (right atrial pressure 3 mmHg). Stress Test: 19   Summary  Excellent functional capacity  Low Normal LV function. No ischemia      Cath:  CT calcium score: 18  Total Agatston calcium score of 69   No incidental clinically important extracardiac CT findings. Studies:     Assessment      1. Essential hypertension, benign    2. Mixed hyperlipidemia    3. Irregular heart rate         Plan   Echo to evaluate for heart size and function, racing heart, chest pain abnl ekg   Stress echo to evaluate for heart strength, chest pain   Monitor results pending. Will call with results  TSH, liver and lipids  Discussed possible need for cardiac cath/angiogram if testing abnormal.   Follow up with NP 3 months, consider f/u ct ca score in f/u         Scribe's attestation: This note was scribed in the presence of Dr. Del Ba MD   by Arnaldo Severs, LPN    Thank you for allowing us to participate in the care of [facility]. Please call me with any questions [phone].         Del Ba MD, [facility]   Interventional Cardiologist  [facility]  [phone] [facility]  [phone] [address] [place] [address]  2023 9:39 AM    I will address the patient's cardiac risk factors and adjusted pharmacologic treatment as needed. In addition, I have reinforced the need for patient directed risk factor modification. Tobacco use was discussed with the patient and educated on the negative effects and was asked not to use. All questions and concerns were addressed to the patient/family. Alternatives to my treatment were discussed. I, Dr Vaishali Warren, personally performed the services described in this documentation, as scribed by the above signed scribe in my presence. It is both accurate and complete to my knowledge. I agree with the details independently gathered by the clinical support staff and the scribed note accurately describes my personal service to the patient.

## 2023-01-05 ENCOUNTER — OFFICE VISIT (OUTPATIENT)
Dept: CARDIOLOGY CLINIC | Age: 68
End: 2023-01-05
Payer: COMMERCIAL

## 2023-01-05 ENCOUNTER — PROCEDURE VISIT (OUTPATIENT)
Dept: CARDIOLOGY CLINIC | Age: 68
End: 2023-01-05

## 2023-01-05 ENCOUNTER — TELEPHONE (OUTPATIENT)
Dept: CARDIOLOGY CLINIC | Age: 68
End: 2023-01-05

## 2023-01-05 VITALS
HEART RATE: 72 BPM | HEIGHT: 73 IN | DIASTOLIC BLOOD PRESSURE: 86 MMHG | WEIGHT: 232.4 LBS | BODY MASS INDEX: 30.8 KG/M2 | SYSTOLIC BLOOD PRESSURE: 148 MMHG | OXYGEN SATURATION: 94 %

## 2023-01-05 DIAGNOSIS — I49.9 IRREGULAR HEART RATE: ICD-10-CM

## 2023-01-05 DIAGNOSIS — E78.2 MIXED HYPERLIPIDEMIA: ICD-10-CM

## 2023-01-05 DIAGNOSIS — I10 ESSENTIAL HYPERTENSION, BENIGN: Primary | ICD-10-CM

## 2023-01-05 PROCEDURE — 99204 OFFICE O/P NEW MOD 45 MIN: CPT | Performed by: INTERNAL MEDICINE

## 2023-01-05 PROCEDURE — 3079F DIAST BP 80-89 MM HG: CPT | Performed by: INTERNAL MEDICINE

## 2023-01-05 PROCEDURE — 1123F ACP DISCUSS/DSCN MKR DOCD: CPT | Performed by: INTERNAL MEDICINE

## 2023-01-05 PROCEDURE — 3077F SYST BP >= 140 MM HG: CPT | Performed by: INTERNAL MEDICINE

## 2023-01-05 PROCEDURE — 93000 ELECTROCARDIOGRAM COMPLETE: CPT | Performed by: INTERNAL MEDICINE

## 2023-01-05 NOTE — TELEPHONE ENCOUNTER
----- Message from Bao Gómez MD sent at 1/5/2023  1:40 PM EST -----  Let patient know echo test shows normal heart function, no new orders or changes at this time. Thanks.

## 2023-01-05 NOTE — PATIENT INSTRUCTIONS
Plan   Echo to evaluate for heart size and function, racing heart  Stress echo to evaluate for heart strength under duress  Monitor results pending.  Will call with results  TSH, liver and lipids  Discussed possible need for cardiac cath/angiogram if testing abnormal.   Follow up with NP 3 months

## 2023-01-12 ENCOUNTER — TELEPHONE (OUTPATIENT)
Dept: CARDIOLOGY CLINIC | Age: 68
End: 2023-01-12

## 2023-01-12 ENCOUNTER — PROCEDURE VISIT (OUTPATIENT)
Dept: CARDIOLOGY CLINIC | Age: 68
End: 2023-01-12
Payer: COMMERCIAL

## 2023-01-12 DIAGNOSIS — I49.9 IRREGULAR HEART RATE: ICD-10-CM

## 2023-01-12 LAB
LV EF: 58 %
LVEF MODALITY: NORMAL

## 2023-01-12 PROCEDURE — 93351 STRESS TTE COMPLETE: CPT | Performed by: INTERNAL MEDICINE

## 2023-01-12 NOTE — TELEPHONE ENCOUNTER
----- Message from Hernan Navarrete MD sent at 1/12/2023 11:36 AM EST -----  Let patient know their stress test is normal, continue current meds, no new orders or changes at this time. Thanks.

## 2023-01-16 ENCOUNTER — TELEPHONE (OUTPATIENT)
Dept: CARDIOLOGY CLINIC | Age: 68
End: 2023-01-16

## 2023-01-16 NOTE — TELEPHONE ENCOUNTER
Grace Carrillo pt's wife called stating they have not received pt's monitor results and they mailed the monitor back prior to [place]. The pt saw Jaret Zavala on 1/5/23 and the monitor was not discussed at time of ov.  Please advise pt's results and call breanna back at [phone]

## 2023-01-23 RX ORDER — TRAZODONE HYDROCHLORIDE 100 MG/1
TABLET ORAL
Qty: 90 TABLET | Refills: 3 | Status: SHIPPED | OUTPATIENT
Start: 2023-01-23

## 2023-01-25 DIAGNOSIS — I49.9 IRREGULAR HEART RATE: ICD-10-CM

## 2023-01-26 NOTE — TELEPHONE ENCOUNTER
Let pt know monitor showed PSVT, rec: referral to EP. If pt would like to start med therapy for this, can start metoprolol tartrate 12.5 mg bid. Thank you.

## 2023-01-30 DIAGNOSIS — R00.2 PALPITATIONS: Primary | ICD-10-CM

## 2023-01-30 DIAGNOSIS — I47.1 PSVT (PAROXYSMAL SUPRAVENTRICULAR TACHYCARDIA) (HCC): ICD-10-CM

## 2023-01-30 NOTE — TELEPHONE ENCOUNTER
Dr. Colby Hollis reviewed pt's heart monitor. Per Dr. Minal Damico review pt is recommended to start taking metoprolol tartrate 12.5 mg tablet BID.  Spoke with pt about monitor results and recommendation of medication made, pt is agreeable to start taking medication

## 2023-01-30 NOTE — TELEPHONE ENCOUNTER
Called and spoke with pt. Relayed Dr. Nevaeh Joseph message regarding monitor results, referral to EP and medication recommendation. Pt is agreeable to start medication and be seen with EP. Please help schedule pt with EP provider, pt does NOT need to be seen urgently, thank you.

## 2023-03-23 DIAGNOSIS — I10 ESSENTIAL HYPERTENSION, BENIGN: ICD-10-CM

## 2023-03-23 RX ORDER — LISINOPRIL AND HYDROCHLOROTHIAZIDE 20; 12.5 MG/1; MG/1
TABLET ORAL
Qty: 180 TABLET | Refills: 3 | Status: SHIPPED | OUTPATIENT
Start: 2023-03-23

## 2023-03-24 SDOH — HEALTH STABILITY: PHYSICAL HEALTH: ON AVERAGE, HOW MANY DAYS PER WEEK DO YOU ENGAGE IN MODERATE TO STRENUOUS EXERCISE (LIKE A BRISK WALK)?: 5 DAYS

## 2023-03-24 SDOH — HEALTH STABILITY: PHYSICAL HEALTH: ON AVERAGE, HOW MANY MINUTES DO YOU ENGAGE IN EXERCISE AT THIS LEVEL?: 90 MIN

## 2023-03-24 ASSESSMENT — PATIENT HEALTH QUESTIONNAIRE - PHQ9
SUM OF ALL RESPONSES TO PHQ QUESTIONS 1-9: 0
SUM OF ALL RESPONSES TO PHQ QUESTIONS 1-9: 0
SUM OF ALL RESPONSES TO PHQ9 QUESTIONS 1 & 2: 0
SUM OF ALL RESPONSES TO PHQ QUESTIONS 1-9: 0
SUM OF ALL RESPONSES TO PHQ QUESTIONS 1-9: 0
1. LITTLE INTEREST OR PLEASURE IN DOING THINGS: 0
2. FEELING DOWN, DEPRESSED OR HOPELESS: 0

## 2023-03-24 ASSESSMENT — LIFESTYLE VARIABLES
HOW OFTEN DO YOU HAVE A DRINK CONTAINING ALCOHOL: MONTHLY OR LESS
HOW MANY STANDARD DRINKS CONTAINING ALCOHOL DO YOU HAVE ON A TYPICAL DAY: PATIENT DECLINED
HOW OFTEN DO YOU HAVE SIX OR MORE DRINKS ON ONE OCCASION: 1
HOW MANY STANDARD DRINKS CONTAINING ALCOHOL DO YOU HAVE ON A TYPICAL DAY: 98
HOW OFTEN DO YOU HAVE A DRINK CONTAINING ALCOHOL: 2

## 2023-03-31 NOTE — PROGRESS NOTES
hypertrophy. Overall left ventricular systolic function appears normal with an ejection fraction of 55-60%. No regional wall motion abnormalities are noted. Diastolic filling parameters suggest grade I diastolic dysfunction. Echo: 01/17/19   Normal left ventricle systolic function with an estimated ejection fraction   of 55%. No regional wall motion abnormalities are seen. Normal left ventricular diastolic filling pressure. Mild bi-atrial enlargement. Mild mitral regurgitation. Systolic pulmonary artery pressure (SPAP) is normal and estimated at 24 mmHg   (right atrial pressure 3 mmHg). Stress Test: 01/17/19   Summary  Excellent functional capacity  Low Normal LV function. No ischemia      Cath:  CT calcium score: 12/26/18  Total Agatston calcium score of 69   No incidental clinically important extracardiac CT findings. Assessment:    1. Essential hypertension, benign  -BP well controlled and at goal  -continue medical management    2. Mixed hyperlipidemia  -on statin  -last lipids reviewed     3. Palpitations  -improved  -event monitor in January 2023: PSVT (AT); has been referred to EP      Plan:  Continue lisinopril/HCT, metoprolol, statin  Discussed low fat/low sodium diet and reinforced regular aerobic exercise. Labs from 4/3/23 reviewed and discussed with pt  Reviewed monitor resutls with pt and reason for EP consult that was initiated by Dr. Mark Amezcua  Follow up with Dr. Mark Amezcua or [facility] in 6 months or sooner if needed    Return in about 6 months (around 10/4/2023) for with Dr. Mark Amezcua or [facility]. Thanks for allowing me to participate in the care of this patient.       RAIN Machuca  [address] [facility] [address] [place] [address]  Office: [phone]  Fax: [phone]      Electronically signed by JACE Michael CNP on 4/4/2023 at 9:46 AM

## 2023-04-01 SDOH — ECONOMIC STABILITY: HOUSING INSECURITY
IN THE LAST 12 MONTHS, WAS THERE A TIME WHEN YOU DID NOT HAVE A STEADY PLACE TO SLEEP OR SLEPT IN A SHELTER (INCLUDING NOW)?: NO

## 2023-04-01 SDOH — ECONOMIC STABILITY: INCOME INSECURITY: HOW HARD IS IT FOR YOU TO PAY FOR THE VERY BASICS LIKE FOOD, HOUSING, MEDICAL CARE, AND HEATING?: NOT HARD AT ALL

## 2023-04-01 SDOH — ECONOMIC STABILITY: FOOD INSECURITY: WITHIN THE PAST 12 MONTHS, YOU WORRIED THAT YOUR FOOD WOULD RUN OUT BEFORE YOU GOT MONEY TO BUY MORE.: NEVER TRUE

## 2023-04-01 SDOH — ECONOMIC STABILITY: FOOD INSECURITY: WITHIN THE PAST 12 MONTHS, THE FOOD YOU BOUGHT JUST DIDN'T LAST AND YOU DIDN'T HAVE MONEY TO GET MORE.: NEVER TRUE

## 2023-04-01 SDOH — ECONOMIC STABILITY: TRANSPORTATION INSECURITY
IN THE PAST 12 MONTHS, HAS LACK OF TRANSPORTATION KEPT YOU FROM MEETINGS, WORK, OR FROM GETTING THINGS NEEDED FOR DAILY LIVING?: NO

## 2023-04-03 ENCOUNTER — OFFICE VISIT (OUTPATIENT)
Dept: FAMILY MEDICINE CLINIC | Age: 68
End: 2023-04-03
Payer: COMMERCIAL

## 2023-04-03 VITALS
BODY MASS INDEX: 31.83 KG/M2 | OXYGEN SATURATION: 98 % | WEIGHT: 235 LBS | DIASTOLIC BLOOD PRESSURE: 80 MMHG | SYSTOLIC BLOOD PRESSURE: 110 MMHG | HEART RATE: 54 BPM | HEIGHT: 72 IN

## 2023-04-03 DIAGNOSIS — E78.2 MIXED HYPERLIPIDEMIA: ICD-10-CM

## 2023-04-03 DIAGNOSIS — M25.562 CHRONIC PAIN OF LEFT KNEE: ICD-10-CM

## 2023-04-03 DIAGNOSIS — Z00.00 PHYSICAL EXAM: Primary | ICD-10-CM

## 2023-04-03 DIAGNOSIS — G89.29 CHRONIC PAIN OF LEFT KNEE: ICD-10-CM

## 2023-04-03 DIAGNOSIS — N52.9 ERECTILE DYSFUNCTION, UNSPECIFIED ERECTILE DYSFUNCTION TYPE: ICD-10-CM

## 2023-04-03 DIAGNOSIS — Z00.00 PHYSICAL EXAM: ICD-10-CM

## 2023-04-03 DIAGNOSIS — L98.9 SKIN ABNORMALITY: ICD-10-CM

## 2023-04-03 LAB
ALBUMIN SERPL-MCNC: 4.4 G/DL (ref 3.4–5)
ALP SERPL-CCNC: 76 U/L (ref 40–129)
ALT SERPL-CCNC: 37 U/L (ref 10–40)
ANION GAP SERPL CALCULATED.3IONS-SCNC: 8 MMOL/L (ref 3–16)
AST SERPL-CCNC: 22 U/L (ref 15–37)
BILIRUB DIRECT SERPL-MCNC: <0.2 MG/DL (ref 0–0.3)
BILIRUB INDIRECT SERPL-MCNC: NORMAL MG/DL (ref 0–1)
BILIRUB SERPL-MCNC: 0.6 MG/DL (ref 0–1)
BUN SERPL-MCNC: 20 MG/DL (ref 7–20)
CALCIUM SERPL-MCNC: 9.8 MG/DL (ref 8.3–10.6)
CHLORIDE SERPL-SCNC: 102 MMOL/L (ref 99–110)
CHOLEST SERPL-MCNC: 124 MG/DL (ref 0–199)
CO2 SERPL-SCNC: 31 MMOL/L (ref 21–32)
CREAT SERPL-MCNC: 1 MG/DL (ref 0.8–1.3)
GFR SERPLBLD CREATININE-BSD FMLA CKD-EPI: >60 ML/MIN/{1.73_M2}
GLUCOSE SERPL-MCNC: 103 MG/DL (ref 70–99)
HDLC SERPL-MCNC: 40 MG/DL (ref 40–60)
LDL CHOLESTEROL CALCULATED: 55 MG/DL
POTASSIUM SERPL-SCNC: 4.5 MMOL/L (ref 3.5–5.1)
PROT SERPL-MCNC: 6.9 G/DL (ref 6.4–8.2)
SODIUM SERPL-SCNC: 141 MMOL/L (ref 136–145)
TRIGL SERPL-MCNC: 144 MG/DL (ref 0–150)
TSH SERPL DL<=0.005 MIU/L-ACNC: 2.08 UIU/ML (ref 0.27–4.2)
VLDLC SERPL CALC-MCNC: 29 MG/DL

## 2023-04-03 PROCEDURE — 99214 OFFICE O/P EST MOD 30 MIN: CPT | Performed by: NURSE PRACTITIONER

## 2023-04-03 PROCEDURE — 3079F DIAST BP 80-89 MM HG: CPT | Performed by: NURSE PRACTITIONER

## 2023-04-03 PROCEDURE — 3074F SYST BP LT 130 MM HG: CPT | Performed by: NURSE PRACTITIONER

## 2023-04-03 PROCEDURE — 99397 PER PM REEVAL EST PAT 65+ YR: CPT | Performed by: NURSE PRACTITIONER

## 2023-04-03 RX ORDER — SILDENAFIL CITRATE 20 MG/1
TABLET ORAL
Qty: 30 TABLET | Refills: 2 | Status: SHIPPED | OUTPATIENT
Start: 2023-04-03

## 2023-04-03 ASSESSMENT — ENCOUNTER SYMPTOMS
DIARRHEA: 0
WHEEZING: 0
SHORTNESS OF BREATH: 0
SORE THROAT: 0
ABDOMINAL PAIN: 0
CONSTIPATION: 0

## 2023-04-03 NOTE — PROGRESS NOTES
Patient ID: Luc Gaona is a 79 y.o. male who presents today for a Physical Exam.      HPI  Here for physical exam   Left knee pain since last July when he was stepping off a ladder he states he landed on his left knee- no swelling, no bruising that he remembers. States now if he tries to kneel on the ground it causes pain in his left knee. Otherwise does not have knee pain    Skin problem: left side of his neck has noticed a red bump- states he thinks it has gotten bigger and sometimes itches, has been there over a year. - will have him see Dr. Fred Ndiaye for possible biopsy    Past Medical History:   Diagnosis Date    Deviated septum     Elevated fasting glucose     Hyperlipidemia     Hypertension     Insomnia     Sleep apnea     CPAP    Supraglottic mass        Past Surgical History:   Procedure Laterality Date    COLONOSCOPY  11/20/13    HERNIA REPAIR  2006    [facility]  2004    SEPTOPLASTY N/A 10/15/2021    SEPTOPLASTY performed by Matthias Malone MD at [address] N/A 10/15/2021    DIRECT LARYNGOSCOPY WITH BIOPSY AND USE OF OPERATING TELESCOPE, SEPTOPLASTY, BILATERAL INFERIOR TURBINATE REDUCTION AND OUTFRACTURE performed by Matthias Malone MD at [address] [place] [address]         Family History   Problem Relation Age of Onset    Diabetes Mother     Parkinsonism Father           Social History     Socioeconomic History    Marital status:      Spouse name: None    Number of children: None    Years of education: None    Highest education level: None   Tobacco Use    Smoking status: Never    Smokeless tobacco: Never   Vaping Use    Vaping Use: Never used   Substance and Sexual Activity    Alcohol use: Not Currently     Comment: beer every 1-2 weeks    Drug use: No    Sexual activity: Yes     Partners: Female     Social Determinants of Health     Financial Resource Strain: Low Risk     Difficulty of Paying Living Expenses: Not hard at all   Food Insecurity: No Food Insecurity

## 2023-04-04 ENCOUNTER — OFFICE VISIT (OUTPATIENT)
Dept: CARDIOLOGY CLINIC | Age: 68
End: 2023-04-04
Payer: COMMERCIAL

## 2023-04-04 ENCOUNTER — HOSPITAL ENCOUNTER (OUTPATIENT)
Age: 68
Discharge: HOME | End: 2023-04-04
Payer: COMMERCIAL

## 2023-04-04 ENCOUNTER — HOSPITAL ENCOUNTER (OUTPATIENT)
Dept: GENERAL RADIOLOGY | Age: 68
Discharge: HOME | End: 2023-04-04
Payer: COMMERCIAL

## 2023-04-04 VITALS
DIASTOLIC BLOOD PRESSURE: 76 MMHG | HEART RATE: 67 BPM | WEIGHT: 236 LBS | SYSTOLIC BLOOD PRESSURE: 130 MMHG | BODY MASS INDEX: 31.28 KG/M2 | HEIGHT: 73 IN | OXYGEN SATURATION: 96 % | RESPIRATION RATE: 18 BRPM

## 2023-04-04 DIAGNOSIS — I10 ESSENTIAL HYPERTENSION, BENIGN: Primary | ICD-10-CM

## 2023-04-04 DIAGNOSIS — E78.2 MIXED HYPERLIPIDEMIA: ICD-10-CM

## 2023-04-04 DIAGNOSIS — M25.562 CHRONIC PAIN OF LEFT KNEE: ICD-10-CM

## 2023-04-04 DIAGNOSIS — R00.2 PALPITATIONS: ICD-10-CM

## 2023-04-04 DIAGNOSIS — G89.29 CHRONIC PAIN OF LEFT KNEE: ICD-10-CM

## 2023-04-04 LAB
EST. AVERAGE GLUCOSE BLD GHB EST-MCNC: 102.5 MG/DL
HBA1C MFR BLD: 5.2 %

## 2023-04-04 PROCEDURE — 3078F DIAST BP <80 MM HG: CPT | Performed by: NURSE PRACTITIONER

## 2023-04-04 PROCEDURE — 3075F SYST BP GE 130 - 139MM HG: CPT | Performed by: NURSE PRACTITIONER

## 2023-04-04 PROCEDURE — 1123F ACP DISCUSS/DSCN MKR DOCD: CPT | Performed by: NURSE PRACTITIONER

## 2023-04-04 PROCEDURE — 99213 OFFICE O/P EST LOW 20 MIN: CPT | Performed by: NURSE PRACTITIONER

## 2023-04-04 PROCEDURE — 73562 X-RAY EXAM OF KNEE 3: CPT

## 2023-04-26 ENCOUNTER — PROCEDURE VISIT (OUTPATIENT)
Dept: FAMILY MEDICINE CLINIC | Age: 68
End: 2023-04-26

## 2023-04-26 VITALS
SYSTOLIC BLOOD PRESSURE: 130 MMHG | WEIGHT: 234 LBS | OXYGEN SATURATION: 96 % | HEART RATE: 67 BPM | BODY MASS INDEX: 31.01 KG/M2 | DIASTOLIC BLOOD PRESSURE: 70 MMHG | HEIGHT: 73 IN

## 2023-04-26 DIAGNOSIS — L98.9 SKIN ABNORMALITY: Primary | ICD-10-CM

## 2023-04-26 DIAGNOSIS — R20.9 DISTURBANCE OF SKIN SENSATION: ICD-10-CM

## 2023-04-27 DIAGNOSIS — R00.0 HEART RATE FAST: Primary | ICD-10-CM

## 2023-05-08 ENCOUNTER — PROCEDURE VISIT (OUTPATIENT)
Dept: FAMILY MEDICINE CLINIC | Age: 68
End: 2023-05-08

## 2023-05-08 VITALS
WEIGHT: 235 LBS | HEIGHT: 73 IN | SYSTOLIC BLOOD PRESSURE: 136 MMHG | HEART RATE: 78 BPM | BODY MASS INDEX: 31.14 KG/M2 | OXYGEN SATURATION: 94 % | DIASTOLIC BLOOD PRESSURE: 84 MMHG

## 2023-05-08 DIAGNOSIS — C44.41 BASAL CELL CARCINOMA (BCC) OF SKIN OF NECK: Primary | ICD-10-CM

## 2023-05-15 ENCOUNTER — OFFICE VISIT (OUTPATIENT)
Dept: FAMILY MEDICINE CLINIC | Age: 68
End: 2023-05-15
Payer: COMMERCIAL

## 2023-05-15 VITALS
HEART RATE: 69 BPM | OXYGEN SATURATION: 96 % | WEIGHT: 232 LBS | BODY MASS INDEX: 30.61 KG/M2 | DIASTOLIC BLOOD PRESSURE: 80 MMHG | SYSTOLIC BLOOD PRESSURE: 136 MMHG

## 2023-05-15 DIAGNOSIS — J30.2 SEASONAL ALLERGIC RHINITIS, UNSPECIFIED TRIGGER: Primary | ICD-10-CM

## 2023-05-15 DIAGNOSIS — C44.41 BASAL CELL CARCINOMA (BCC) OF SKIN OF NECK: ICD-10-CM

## 2023-05-15 DIAGNOSIS — Z48.02 VISIT FOR SUTURE REMOVAL: ICD-10-CM

## 2023-05-15 PROCEDURE — 3079F DIAST BP 80-89 MM HG: CPT | Performed by: NURSE PRACTITIONER

## 2023-05-15 PROCEDURE — 3075F SYST BP GE 130 - 139MM HG: CPT | Performed by: NURSE PRACTITIONER

## 2023-05-15 PROCEDURE — 99212 OFFICE O/P EST SF 10 MIN: CPT | Performed by: NURSE PRACTITIONER

## 2023-05-15 PROCEDURE — 1123F ACP DISCUSS/DSCN MKR DOCD: CPT | Performed by: NURSE PRACTITIONER

## 2023-05-15 ASSESSMENT — ENCOUNTER SYMPTOMS
SHORTNESS OF BREATH: 0
WHEEZING: 0
COUGH: 1
SORE THROAT: 1

## 2023-05-15 NOTE — PROGRESS NOTES
Patient: Della Anne is a 79 y.o. male who presents today with the following Chief Complaint(s):  Chief Complaint   Patient presents with    Suture / Staple Removal     Left side of neck, 3-4         HPI  Here for suture removal- had biopsy and basal cell carcinoma removed from left side of his neck by Dr. Joan Pike about a week ago- 3 sutures to remove. No complaints. No signs of infection       Cough, congestion and sore throat for a few days- no meds at home tried. Current Outpatient Medications   Medication Sig Dispense Refill    lisinopril-hydroCHLOROthiazide (PRINZIDE;ZESTORETIC) 20-12.5 MG per tablet TAKE 2 TABLETS BY MOUTH IN  THE MORNING 180 tablet 3    traZODone (DESYREL) 100 MG tablet TAKE 1 TABLET BY MOUTH AT  NIGHT 90 tablet 3    gabapentin (NEURONTIN) 300 MG capsule Take 1 capsule by mouth every evening for 360 days. 90 capsule 3    atorvastatin (LIPITOR) 40 MG tablet TAKE 1 TABLET BY MOUTH  DAILY 90 tablet 3    ibuprofen (ADVIL;MOTRIN) 600 MG tablet Take 1 tablet by mouth 4 times daily as needed for Pain 120 tablet 0    sildenafil (REVATIO) 20 MG tablet May take up to 5 pills daily PRN (Patient not taking: Reported on 4/26/2023) 30 tablet 2     No current facility-administered medications for this visit. Patient's past medical history, surgical history, family history, medications,  andallergies  were all reviewed and updated as appropriate today. Review of Systems   Constitutional:  Negative for chills and fever. HENT:  Positive for congestion and sore throat. Negative for ear pain. Respiratory:  Positive for cough. Negative for shortness of breath and wheezing. Neurological:  Negative for headaches. Physical Exam  Vitals and nursing note reviewed. Constitutional:       Appearance: Normal appearance. He is well-developed. HENT:      Head: Normocephalic and atraumatic.       Right Ear: Tympanic membrane and external ear normal.      Left Ear: Tympanic membrane and external

## 2023-05-18 NOTE — PROGRESS NOTES
PROCEDURE NOTE    PRE-OP DIAGNOSIS:   BCC    PROCEDURE:  Skin Lesion Excision(s) left neck 2.1 x 3.1 cm    INDICATIONS:  Tita Orr is a 79 y.o. male who presents for minor skin surgery. The patient understands all risks, benefits, indications, potential complications, and alternatives, and freely consents for the procedure. The patient also understands the option of performing no surgery, the risk for scarring, and the technique of the procedure. ANESTHESIA:  Local.    TECHNIQUE:  After informed consent was obtained, and after the skin was prepped and draped, 1% lidocaine with epinephrine for anesthetic was injected around and underneath the site.   elliptical excision in total was performed. Simple interrupted sutures used to close. A dressing was applied and wound care instructions were provided. Walter tolerated the procedure well and without complications. The patient will be alert for any signs of cutaneous infection and will follow up as instructed.

## 2023-09-28 NOTE — PROGRESS NOTES
[facility]   CARDIAC EVALUATION NOTE  [phone]      PCP:  JACE Steward CNP    Reason for Consultation/Chief Complaint: Follow up, HTN    Subjective   History of Present Illness:  Rich Laird is a 79 y.o. patient with a history of HTN and HLD who presented with an elevated HR originally on 12/21/18. Joo Mayorga He was started on BP medications several years ago. His BP is elevated on exam. He states the elevated HR started prior to his exercise routine on 11/28/18. He runs a mile on the treadmill, 1 mile on elliptical and 1 mile on the bike. He reports he had an anxious sensation. He checked his HR on the treadmill and his HR was 150 prior to starting a run. He changed machines and found his HR was still 150. His HR reached 168 and he started feeling palpitations. After 20 mins the episode resolved with a HR down to 107. He had Sudafed due to a cold starting a few days prior to 11/28/18. He had a normal work out the next day. He checks his HR at home which shows a HR of 60s. He has never had a similar reaction to other medications. He has not had previous cardiac workup. Her CT calcium score from 12/26/18 was 69. Her echo from 01/17/19 showed her EF was 55% with mild bi-atrial enlargement and mild MR. Her stress test from 01/17/19 was negative for ischemia. 2/22/19 his coreg was increased due to elevated BP. He is now taking coreg 3.125 mg twice daily. He denied CP, SOB, dizziness or syncope. 5/6/19 OV with RAIN Dutton for secondary follow up after increasing carvedilol for BP control. At that time his home BP had been 140/85 and no significant change despite the increase dose. OV 1/5/2023 he reports that his heart has been racing rapidly recently. This has been happening for the last 3 to 4 weeks. He states that he feels this when working out. It causes him to get anxiety when it happens and notes chest pain.  He states that he checks his BP regularly and BP has

## 2023-09-29 ENCOUNTER — OFFICE VISIT (OUTPATIENT)
Dept: CARDIOLOGY CLINIC | Age: 68
End: 2023-09-29
Payer: COMMERCIAL

## 2023-09-29 VITALS
SYSTOLIC BLOOD PRESSURE: 112 MMHG | BODY MASS INDEX: 31.28 KG/M2 | OXYGEN SATURATION: 95 % | DIASTOLIC BLOOD PRESSURE: 60 MMHG | WEIGHT: 236 LBS | HEART RATE: 70 BPM | HEIGHT: 73 IN

## 2023-09-29 DIAGNOSIS — Z82.49 FAMILY HISTORY OF EARLY CAD: ICD-10-CM

## 2023-09-29 DIAGNOSIS — R00.0 HEART RATE FAST: ICD-10-CM

## 2023-09-29 DIAGNOSIS — E78.2 MIXED HYPERLIPIDEMIA: Primary | ICD-10-CM

## 2023-09-29 DIAGNOSIS — R00.2 PALPITATIONS: ICD-10-CM

## 2023-09-29 DIAGNOSIS — I10 ESSENTIAL HYPERTENSION, BENIGN: ICD-10-CM

## 2023-09-29 PROCEDURE — 99214 OFFICE O/P EST MOD 30 MIN: CPT | Performed by: INTERNAL MEDICINE

## 2023-09-29 PROCEDURE — 1123F ACP DISCUSS/DSCN MKR DOCD: CPT | Performed by: INTERNAL MEDICINE

## 2023-09-29 PROCEDURE — 3074F SYST BP LT 130 MM HG: CPT | Performed by: INTERNAL MEDICINE

## 2023-09-29 PROCEDURE — 3078F DIAST BP <80 MM HG: CPT | Performed by: INTERNAL MEDICINE

## 2023-10-12 DIAGNOSIS — Z12.11 COLON CANCER SCREENING: Primary | ICD-10-CM

## 2023-11-16 ENCOUNTER — OFFICE VISIT (OUTPATIENT)
Dept: FAMILY MEDICINE CLINIC | Age: 68
End: 2023-11-16

## 2023-11-16 VITALS
OXYGEN SATURATION: 94 % | BODY MASS INDEX: 30.74 KG/M2 | HEART RATE: 65 BPM | SYSTOLIC BLOOD PRESSURE: 122 MMHG | DIASTOLIC BLOOD PRESSURE: 74 MMHG | WEIGHT: 233 LBS

## 2023-11-16 DIAGNOSIS — M54.32 BILATERAL SCIATICA: Primary | ICD-10-CM

## 2023-11-16 DIAGNOSIS — Z23 NEED FOR INFLUENZA VACCINATION: ICD-10-CM

## 2023-11-16 DIAGNOSIS — I10 ESSENTIAL HYPERTENSION, BENIGN: ICD-10-CM

## 2023-11-16 DIAGNOSIS — R05.3 CHRONIC COUGH: ICD-10-CM

## 2023-11-16 DIAGNOSIS — M54.31 BILATERAL SCIATICA: Primary | ICD-10-CM

## 2023-11-16 ASSESSMENT — ENCOUNTER SYMPTOMS
WHEEZING: 0
SHORTNESS OF BREATH: 0
COUGH: 1

## 2023-11-16 NOTE — PROGRESS NOTES
Patient: Samy Stanley is a 79 y.o. male who presents today with the following Chief Complaint(s):  Chief Complaint   Patient presents with    6 Month Follow-Up         HPI  Here for 6 month follow up HTN. Also with c/o Right side sciatica- been going on awhile- asking about some meds he saw advertised- unaware of these- suggest PT. Chronic cough: states he had some granular nodules on his throat a few years ago that caused him to cough removed- feels like they may be coming back. Current Outpatient Medications   Medication Sig Dispense Refill    lisinopril-hydroCHLOROthiazide (PRINZIDE;ZESTORETIC) 20-12.5 MG per tablet TAKE 2 TABLETS BY MOUTH IN  THE MORNING 180 tablet 3    traZODone (DESYREL) 100 MG tablet TAKE 1 TABLET BY MOUTH AT  NIGHT 90 tablet 3    gabapentin (NEURONTIN) 300 MG capsule Take 1 capsule by mouth every evening for 360 days. 90 capsule 3    atorvastatin (LIPITOR) 40 MG tablet TAKE 1 TABLET BY MOUTH  DAILY 90 tablet 3    ibuprofen (ADVIL;MOTRIN) 600 MG tablet Take 1 tablet by mouth 4 times daily as needed for Pain 120 tablet 0     No current facility-administered medications for this visit. Patient's past medical history, surgical history, family history, medications,  andallergies  were all reviewed and updated as appropriate today. Review of Systems   Respiratory:  Positive for cough (feels like the granular nodules may be coming back). Negative for shortness of breath and wheezing. Cardiovascular:  Negative for chest pain. Neurological:  Negative for dizziness, light-headedness and headaches. Physical Exam  Vitals and nursing note reviewed. Constitutional:       Appearance: Normal appearance. He is well-developed. HENT:      Head: Normocephalic and atraumatic. Eyes:      Conjunctiva/sclera: Conjunctivae normal.   Cardiovascular:      Rate and Rhythm: Normal rate and regular rhythm. Heart sounds: Normal heart sounds. No murmur heard.   Pulmonary:

## 2023-11-24 ENCOUNTER — OFFICE VISIT (OUTPATIENT)
Dept: ENT CLINIC | Age: 68
End: 2023-11-24

## 2023-11-24 VITALS
WEIGHT: 233 LBS | RESPIRATION RATE: 16 BRPM | HEART RATE: 69 BPM | DIASTOLIC BLOOD PRESSURE: 76 MMHG | BODY MASS INDEX: 30.88 KG/M2 | SYSTOLIC BLOOD PRESSURE: 128 MMHG | HEIGHT: 73 IN

## 2023-11-24 DIAGNOSIS — J38.3 LESION OF VOCAL FOLD: ICD-10-CM

## 2023-11-24 DIAGNOSIS — K21.9 LARYNGOPHARYNGEAL REFLUX (LPR): Primary | ICD-10-CM

## 2023-12-12 ASSESSMENT — ENCOUNTER SYMPTOMS
RIGHT EYE: 0
HEMATEMESIS: 0
STRIDOR: 0
HEMATOCHEZIA: 0
LEFT EYE: 0
WHEEZING: 0
SHORTNESS OF BREATH: 0
SNORING: 1

## 2023-12-12 NOTE — PROGRESS NOTES
visit. Lab Review     Lab Results   Component Value Date/Time     04/03/2023 07:37 AM    K 4.5 04/03/2023 07:37 AM    K 4.1 10/15/2021 06:35 AM     04/03/2023 07:37 AM    CO2 31 04/03/2023 07:37 AM    BUN 20 04/03/2023 07:37 AM    CREATININE 1.0 04/03/2023 07:37 AM    GLUCOSE 103 04/03/2023 07:37 AM    CALCIUM 9.8 04/03/2023 07:37 AM        Lab Results   Component Value Date    WBC 6.3 02/14/2022    HGB 15.0 02/14/2022    HCT 43.9 02/14/2022    MCV 84.5 02/14/2022     02/14/2022       Lab Results   Component Value Date    TSHFT4 2.08 04/03/2023       No results found for: \"BNP\"    I, Carolina Ortiz RN, am scribing for and in the presence of Dr. Deena Summers.  12/13/23 10:17 AM   Carolina Ortiz RN

## 2023-12-13 ENCOUNTER — OFFICE VISIT (OUTPATIENT)
Dept: CARDIOLOGY CLINIC | Age: 68
End: 2023-12-13
Payer: COMMERCIAL

## 2023-12-13 VITALS
DIASTOLIC BLOOD PRESSURE: 84 MMHG | OXYGEN SATURATION: 94 % | HEIGHT: 73 IN | BODY MASS INDEX: 31.28 KG/M2 | SYSTOLIC BLOOD PRESSURE: 150 MMHG | WEIGHT: 236 LBS

## 2023-12-13 DIAGNOSIS — I47.10 PSVT (PAROXYSMAL SUPRAVENTRICULAR TACHYCARDIA): ICD-10-CM

## 2023-12-13 DIAGNOSIS — E66.9 OBESITY (BMI 30.0-34.9): ICD-10-CM

## 2023-12-13 DIAGNOSIS — R00.2 PALPITATIONS: Primary | ICD-10-CM

## 2023-12-13 DIAGNOSIS — G47.33 OSA (OBSTRUCTIVE SLEEP APNEA): ICD-10-CM

## 2023-12-13 DIAGNOSIS — I10 ESSENTIAL HYPERTENSION: ICD-10-CM

## 2023-12-13 DIAGNOSIS — R29.818 SUSPECTED SLEEP APNEA: ICD-10-CM

## 2023-12-13 PROCEDURE — 99214 OFFICE O/P EST MOD 30 MIN: CPT | Performed by: INTERNAL MEDICINE

## 2023-12-13 PROCEDURE — 3079F DIAST BP 80-89 MM HG: CPT | Performed by: INTERNAL MEDICINE

## 2023-12-13 PROCEDURE — 1123F ACP DISCUSS/DSCN MKR DOCD: CPT | Performed by: INTERNAL MEDICINE

## 2023-12-13 PROCEDURE — 3077F SYST BP >= 140 MM HG: CPT | Performed by: INTERNAL MEDICINE

## 2023-12-13 PROCEDURE — 93000 ELECTROCARDIOGRAM COMPLETE: CPT | Performed by: INTERNAL MEDICINE

## 2023-12-13 ASSESSMENT — ENCOUNTER SYMPTOMS: SLEEP DISTURBANCES DUE TO BREATHING: 1

## 2023-12-13 NOTE — PATIENT INSTRUCTIONS
Plan:     Referral to Dr. Sorin Caldwell to discuss sleep apnea treatment. Try to decrease carbohydrates in your diet. Follow up with me in 12 months.

## 2023-12-27 RX ORDER — TRAZODONE HYDROCHLORIDE 100 MG/1
TABLET ORAL
Qty: 90 TABLET | Refills: 1 | Status: SHIPPED | OUTPATIENT
Start: 2023-12-27

## 2023-12-27 NOTE — TELEPHONE ENCOUNTER
Last Office Visit  -  11/16/23  Next Office Visit  -  n/a    Last Filled  -  1/23/23  Last UDS -    Contract -

## 2024-01-01 DIAGNOSIS — M25.512 CHRONIC LEFT SHOULDER PAIN: ICD-10-CM

## 2024-01-01 DIAGNOSIS — G89.29 CHRONIC LEFT SHOULDER PAIN: ICD-10-CM

## 2024-01-01 DIAGNOSIS — E78.2 MIXED HYPERLIPIDEMIA: ICD-10-CM

## 2024-01-01 RX ORDER — GABAPENTIN 300 MG/1
CAPSULE ORAL
Qty: 90 CAPSULE | Refills: 3 | Status: SHIPPED | OUTPATIENT
Start: 2024-01-01 | End: 2024-03-31

## 2024-01-01 RX ORDER — ATORVASTATIN CALCIUM 40 MG/1
40 TABLET, FILM COATED ORAL DAILY
Qty: 90 TABLET | Refills: 3 | Status: SHIPPED | OUTPATIENT
Start: 2024-01-01

## 2024-01-02 NOTE — TELEPHONE ENCOUNTER
9/9/2023 SRJ    12/11/2024 John upcoming appt  Metoprolol discontinued 4/10/2023-  Discussed the risks and benefits of an EP study with possible SVT ablation (literature provided).   - the patient would like to defer at this time   Discussed stopping Metoprolol with monitoring of recurrent episodes.  - the patient would like to proceed with this option   Call our office to notify us of another episode.   Follow up with me in 2 - 3 months.

## 2024-03-05 DIAGNOSIS — I10 ESSENTIAL HYPERTENSION, BENIGN: ICD-10-CM

## 2024-03-05 RX ORDER — LISINOPRIL AND HYDROCHLOROTHIAZIDE 20; 12.5 MG/1; MG/1
TABLET ORAL
Qty: 180 TABLET | Refills: 3 | Status: SHIPPED | OUTPATIENT
Start: 2024-03-05

## 2024-03-05 NOTE — TELEPHONE ENCOUNTER
Last Office Visit  -  11/16/23  Next Office Visit  -  n/a    Last Filled  -  3/23/23  Last UDS -    Contract -

## 2024-05-22 NOTE — PROGRESS NOTES
[facility]  DIVISION OF OTOLARYNGOLOGY- HEAD & NECK SURGERY  CONSULT    Patient Name: Walter Gan  Medical Record Number:  [id number]  Primary Care Physician:  Deneen Shelton APRN - CNP  Date of Consultation: 5/24/2024    Chief Complaint:   Chief Complaint   Patient presents with    Follow-up     Patient is being seen for a follow up, he is no longer having the reflux symptoms.       HISTORY OF PRESENT ILLNESS  Walter is a(n) 69 y.o. male who is a previous Dr Toscano patient being treated for nasal congestion and chronic cough. 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  He states he has been doing well with an occasional cough.  He will take his omeprazole as needed.    Interval History 5/24/2024  Tavon states he omeprazole and Gaviscon have significantly helped with his symptoms.  He is no longer having sore throat.  He is using his reflux medications as needed    Patient Active Problem List   Diagnosis    Insomnia    Elevated fasting glucose    Essential hypertension, benign    Mixed hyperlipidemia    Palpitations    Heart rate fast    Family history of early CAD    Supraglottic mass    Nasal congestion    Chronic cough    PSVT (paroxysmal supraventricular tachycardia) ([facility])     Past Surgical History:   Procedure Laterality Date    COLONOSCOPY  11/20/13    HERNIA REPAIR  2006    JOSELYN MUÑOZ  2004    SEPTOPLASTY N/A 10/15/2021    SEPTOPLASTY performed by Glenn Toscano MD at [facility] OR    SINUS ENDOSCOPY N/A 10/15/2021    DIRECT LARYNGOSCOPY WITH BIOPSY AND USE OF OPERATING TELESCOPE, SEPTOPLASTY, BILATERAL INFERIOR TURBINATE REDUCTION AND OUTFRACTURE performed by Glenn Toscano MD at [facility] OR    WISDOM TOOTH EXTRACTION       Family History   Problem Relation Age of Onset    Diabetes Mother     Parkinsonism Father      Social History     Socioeconomic History    Marital status:      Spouse name: Not on file    Number of children: Not on file    Years of education: Not on file

## 2024-05-24 ENCOUNTER — OFFICE VISIT (OUTPATIENT)
Dept: ENT CLINIC | Age: 69
End: 2024-05-24

## 2024-05-24 VITALS
RESPIRATION RATE: 16 BRPM | DIASTOLIC BLOOD PRESSURE: 86 MMHG | BODY MASS INDEX: 30.48 KG/M2 | SYSTOLIC BLOOD PRESSURE: 148 MMHG | HEIGHT: 73 IN | HEART RATE: 68 BPM | WEIGHT: 230 LBS

## 2024-05-24 DIAGNOSIS — J38.3 LESION OF VOCAL FOLD: ICD-10-CM

## 2024-05-24 DIAGNOSIS — K21.9 LARYNGOPHARYNGEAL REFLUX (LPR): Primary | ICD-10-CM

## 2024-05-28 RX ORDER — TRAZODONE HYDROCHLORIDE 100 MG/1
TABLET ORAL
Qty: 90 TABLET | Refills: 3 | Status: SHIPPED | OUTPATIENT
Start: 2024-05-28

## 2024-07-17 ASSESSMENT — PATIENT HEALTH QUESTIONNAIRE - PHQ9
SUM OF ALL RESPONSES TO PHQ9 QUESTIONS 1 & 2: 0
2. FEELING DOWN, DEPRESSED OR HOPELESS: NOT AT ALL
1. LITTLE INTEREST OR PLEASURE IN DOING THINGS: NOT AT ALL

## 2024-07-23 ENCOUNTER — OFFICE VISIT (OUTPATIENT)
Dept: FAMILY MEDICINE CLINIC | Age: 69
End: 2024-07-23
Payer: COMMERCIAL

## 2024-07-23 VITALS
OXYGEN SATURATION: 95 % | BODY MASS INDEX: 30.21 KG/M2 | HEART RATE: 63 BPM | DIASTOLIC BLOOD PRESSURE: 70 MMHG | WEIGHT: 229 LBS | SYSTOLIC BLOOD PRESSURE: 110 MMHG

## 2024-07-23 DIAGNOSIS — E78.2 MIXED HYPERLIPIDEMIA: ICD-10-CM

## 2024-07-23 DIAGNOSIS — M25.512 CHRONIC LEFT SHOULDER PAIN: ICD-10-CM

## 2024-07-23 DIAGNOSIS — G89.29 CHRONIC LEFT SHOULDER PAIN: ICD-10-CM

## 2024-07-23 DIAGNOSIS — Z00.00 PHYSICAL EXAM: Primary | ICD-10-CM

## 2024-07-23 DIAGNOSIS — Z12.5 PROSTATE CANCER SCREENING: ICD-10-CM

## 2024-07-23 DIAGNOSIS — I10 PRIMARY HYPERTENSION: ICD-10-CM

## 2024-07-23 LAB
ALBUMIN SERPL-MCNC: 4.5 G/DL (ref 3.4–5)
ALBUMIN/GLOB SERPL: 1.7 {RATIO} (ref 1.1–2.2)
ALP SERPL-CCNC: 83 U/L (ref 40–129)
ALT SERPL-CCNC: 40 U/L (ref 10–40)
ANION GAP SERPL CALCULATED.3IONS-SCNC: 11 MMOL/L (ref 3–16)
AST SERPL-CCNC: 24 U/L (ref 15–37)
BASOPHILS # BLD: 0.1 K/UL (ref 0–0.2)
BASOPHILS NFR BLD: 1 %
BILIRUB SERPL-MCNC: 0.6 MG/DL (ref 0–1)
BUN SERPL-MCNC: 23 MG/DL (ref 7–20)
CALCIUM SERPL-MCNC: 9.8 MG/DL (ref 8.3–10.6)
CHLORIDE SERPL-SCNC: 104 MMOL/L (ref 99–110)
CHOLEST SERPL-MCNC: 125 MG/DL (ref 0–199)
CO2 SERPL-SCNC: 27 MMOL/L (ref 21–32)
CREAT SERPL-MCNC: 1 MG/DL (ref 0.8–1.3)
DEPRECATED RDW RBC AUTO: 14 % (ref 12.4–15.4)
EOSINOPHIL # BLD: 0.3 K/UL (ref 0–0.6)
EOSINOPHIL NFR BLD: 4.5 %
GFR SERPLBLD CREATININE-BSD FMLA CKD-EPI: 82 ML/MIN/{1.73_M2}
GLUCOSE SERPL-MCNC: 101 MG/DL (ref 70–99)
HCT VFR BLD AUTO: 43.6 % (ref 40.5–52.5)
HDLC SERPL-MCNC: 35 MG/DL (ref 40–60)
HGB BLD-MCNC: 15.1 G/DL (ref 13.5–17.5)
LDLC SERPL CALC-MCNC: 65 MG/DL
LYMPHOCYTES # BLD: 1.8 K/UL (ref 1–5.1)
LYMPHOCYTES NFR BLD: 24.4 %
MCH RBC QN AUTO: 29.7 PG (ref 26–34)
MCHC RBC AUTO-ENTMCNC: 34.5 G/DL (ref 31–36)
MCV RBC AUTO: 85.9 FL (ref 80–100)
MONOCYTES # BLD: 0.5 K/UL (ref 0–1.3)
MONOCYTES NFR BLD: 7 %
NEUTROPHILS # BLD: 4.7 K/UL (ref 1.7–7.7)
NEUTROPHILS NFR BLD: 63.1 %
PLATELET # BLD AUTO: 225 K/UL (ref 135–450)
PMV BLD AUTO: 8.3 FL (ref 5–10.5)
POTASSIUM SERPL-SCNC: 4.2 MMOL/L (ref 3.5–5.1)
PROT SERPL-MCNC: 7.1 G/DL (ref 6.4–8.2)
PSA SERPL DL<=0.01 NG/ML-MCNC: 1.13 NG/ML (ref 0–4)
RBC # BLD AUTO: 5.08 M/UL (ref 4.2–5.9)
SODIUM SERPL-SCNC: 142 MMOL/L (ref 136–145)
TRIGL SERPL-MCNC: 123 MG/DL (ref 0–150)
VLDLC SERPL CALC-MCNC: 25 MG/DL
WBC # BLD AUTO: 7.5 K/UL (ref 4–11)

## 2024-07-23 PROCEDURE — 99397 PER PM REEVAL EST PAT 65+ YR: CPT | Performed by: NURSE PRACTITIONER

## 2024-07-23 PROCEDURE — 3074F SYST BP LT 130 MM HG: CPT | Performed by: NURSE PRACTITIONER

## 2024-07-23 PROCEDURE — 3078F DIAST BP <80 MM HG: CPT | Performed by: NURSE PRACTITIONER

## 2024-07-23 RX ORDER — ATORVASTATIN CALCIUM 40 MG/1
40 TABLET, FILM COATED ORAL DAILY
Qty: 90 TABLET | Refills: 3 | Status: SHIPPED | OUTPATIENT
Start: 2024-07-23

## 2024-07-23 RX ORDER — LISINOPRIL AND HYDROCHLOROTHIAZIDE 20; 12.5 MG/1; MG/1
2 TABLET ORAL EVERY MORNING
Qty: 180 TABLET | Refills: 3 | Status: SHIPPED | OUTPATIENT
Start: 2024-07-23

## 2024-07-23 RX ORDER — GABAPENTIN 300 MG/1
CAPSULE ORAL
Qty: 90 CAPSULE | Refills: 3 | Status: SHIPPED | OUTPATIENT
Start: 2024-07-23 | End: 2024-10-21

## 2024-07-23 ASSESSMENT — ENCOUNTER SYMPTOMS
SHORTNESS OF BREATH: 0
GASTROINTESTINAL NEGATIVE: 1
WHEEZING: 0
SORE THROAT: 0

## 2024-07-23 NOTE — PROGRESS NOTES
About Running Out of Food in the Last Year: Never true     Ran Out of Food in the Last Year: Never true   Transportation Needs: Unknown (7/23/2024)    PRAPARE - Transportation     Lack of Transportation (Non-Medical): No   Physical Activity: Sufficiently Active (3/24/2023)    Exercise Vital Sign     Days of Exercise per Week: 5 days     Minutes of Exercise per Session: 90 min   Housing Stability: Unknown (7/23/2024)    Housing Stability Vital Sign     Unstable Housing in the Last Year: No       Allergies   Allergen Reactions    Codeine Rash    Penicillins Rash       Current Outpatient Medications   Medication Sig Dispense Refill    atorvastatin (LIPITOR) 40 MG tablet Take 1 tablet by mouth daily 90 tablet 3    gabapentin (NEURONTIN) 300 MG capsule TAKE 1 CAPSULE BY MOUTH IN THE  EVENING 90 capsule 3    lisinopril-hydroCHLOROthiazide (PRINZIDE;ZESTORETIC) 20-12.5 MG per tablet Take 2 tablets by mouth every morning 180 tablet 3    traZODone (DESYREL) 100 MG tablet TAKE 1 TABLET BY MOUTH AT NIGHT 90 tablet 3    ibuprofen (ADVIL;MOTRIN) 600 MG tablet Take 1 tablet by mouth 4 times daily as needed for Pain 120 tablet 0     No current facility-administered medications for this visit.       The patient's past medical history, past surgical history, family history, medications, and allergies were all reviewed and updated at appropriate today.      Review of Systems   Constitutional:  Negative for chills and fever.   HENT:  Negative for congestion and sore throat.    Eyes:  Negative for visual disturbance.        Reading glasses   Respiratory:  Negative for shortness of breath and wheezing.    Cardiovascular:  Negative for chest pain and palpitations.   Gastrointestinal: Negative.    Genitourinary: Negative.    Musculoskeletal:         Sciatica   Skin: Negative.    Allergic/Immunologic: Negative for food allergies.   Neurological:  Negative for dizziness and headaches.   Hematological:  Does not bruise/bleed easily.

## 2024-12-10 ASSESSMENT — ENCOUNTER SYMPTOMS
SLEEP DISTURBANCES DUE TO BREATHING: 1
SHORTNESS OF BREATH: 0
WHEEZING: 0
SNORING: 1
STRIDOR: 0
HEMATEMESIS: 0
LEFT EYE: 0
HEMATOCHEZIA: 0
RIGHT EYE: 0

## 2024-12-10 NOTE — PROGRESS NOTES
Assessment:     1. Palpitations: patient initially presented with few episodes of palpitations with rates around 130-150. Usually occurred when he's about to get on treadmill. Had associated feeling of anxiety but no significant dizziness, chest pain or shortness of breath. Did not have syncope. Had documented SVT on his event monitor (just under 2 minutes with rates around 150 BPM). Possibly AVNRT although not definitive.     He was doing OK on low dose metoprolol. Unclear if no episodes or just no symptoms. Due to concern about beta-blocker use with bradycardia issues on monitor (although nocturnal) and issues with erectile dysfunction (may be worsened with beta-blocker therapy), we stopped his beta-blocker. We want him to remain active and lose more weight (had frequent PAC's likely related to untreated KENDALL).     Has felt well in recent months (and over past year) with no sustained episodes of arrhythmia. He estimates 2-3 episodes of 1-2 minutes of palpitations over the past one year. ECG last time showed multiple PAC's; today single PAC on ECG.     Given that he is essentially asymptomatic, will continue to monitor clinically off medical therapy. He has obtained the [employer] device to record any sustained episodes of palpitations. To date, all recordings have been normal or \"inconclusive\".    2. Hypertension: usually with good blood pressure control with some elevated values at times. Monitor off metoprolol. Consider re-initiation of anti-hypertensive treatment if BP increases.    3. Untreated obstructive sleep apnea: previous study showed AHI 9 (home study). Did not tolerate CPAP. Discussed with him link between KENDALL and cardiac arrhythmias. Also discussed importance of treatment (even if he feels well now) to prevent arrhythmias such as atrial fibrillation in future. We referred back to sleep medicine to discuss different treatment options (different mask types). Did not go to that visit. Tried CPAP again and

## 2024-12-11 ENCOUNTER — OFFICE VISIT (OUTPATIENT)
Dept: CARDIOLOGY CLINIC | Age: 69
End: 2024-12-11
Payer: COMMERCIAL

## 2024-12-11 VITALS
HEIGHT: 73 IN | DIASTOLIC BLOOD PRESSURE: 78 MMHG | BODY MASS INDEX: 31.33 KG/M2 | HEART RATE: 71 BPM | OXYGEN SATURATION: 96 % | WEIGHT: 236.4 LBS | SYSTOLIC BLOOD PRESSURE: 132 MMHG

## 2024-12-11 DIAGNOSIS — E66.811 OBESITY (BMI 30.0-34.9): ICD-10-CM

## 2024-12-11 DIAGNOSIS — R00.2 PALPITATIONS: ICD-10-CM

## 2024-12-11 DIAGNOSIS — I47.10 PSVT (PAROXYSMAL SUPRAVENTRICULAR TACHYCARDIA) (HCC): Primary | ICD-10-CM

## 2024-12-11 DIAGNOSIS — E78.5 DYSLIPIDEMIA: ICD-10-CM

## 2024-12-11 DIAGNOSIS — I10 PRIMARY HYPERTENSION: ICD-10-CM

## 2024-12-11 DIAGNOSIS — G47.33 OSA (OBSTRUCTIVE SLEEP APNEA): ICD-10-CM

## 2024-12-11 PROCEDURE — 3078F DIAST BP <80 MM HG: CPT | Performed by: INTERNAL MEDICINE

## 2024-12-11 PROCEDURE — 99214 OFFICE O/P EST MOD 30 MIN: CPT | Performed by: INTERNAL MEDICINE

## 2024-12-11 PROCEDURE — 93000 ELECTROCARDIOGRAM COMPLETE: CPT | Performed by: INTERNAL MEDICINE

## 2024-12-11 PROCEDURE — 3075F SYST BP GE 130 - 139MM HG: CPT | Performed by: INTERNAL MEDICINE

## 2024-12-11 PROCEDURE — 1123F ACP DISCUSS/DSCN MKR DOCD: CPT | Performed by: INTERNAL MEDICINE

## 2024-12-11 NOTE — PATIENT INSTRUCTIONS
Plan:     The current medical regimen is effective;  continue present plan and medications.  Follow up with me in 12 months.

## 2025-01-31 SDOH — ECONOMIC STABILITY: FOOD INSECURITY: WITHIN THE PAST 12 MONTHS, YOU WORRIED THAT YOUR FOOD WOULD RUN OUT BEFORE YOU GOT MONEY TO BUY MORE.: NEVER TRUE

## 2025-01-31 SDOH — ECONOMIC STABILITY: FOOD INSECURITY: WITHIN THE PAST 12 MONTHS, THE FOOD YOU BOUGHT JUST DIDN'T LAST AND YOU DIDN'T HAVE MONEY TO GET MORE.: NEVER TRUE

## 2025-01-31 SDOH — ECONOMIC STABILITY: TRANSPORTATION INSECURITY
IN THE PAST 12 MONTHS, HAS THE LACK OF TRANSPORTATION KEPT YOU FROM MEDICAL APPOINTMENTS OR FROM GETTING MEDICATIONS?: NO

## 2025-01-31 SDOH — ECONOMIC STABILITY: INCOME INSECURITY: IN THE LAST 12 MONTHS, WAS THERE A TIME WHEN YOU WERE NOT ABLE TO PAY THE MORTGAGE OR RENT ON TIME?: NO

## 2025-01-31 ASSESSMENT — PATIENT HEALTH QUESTIONNAIRE - PHQ9
SUM OF ALL RESPONSES TO PHQ QUESTIONS 1-9: 0
2. FEELING DOWN, DEPRESSED OR HOPELESS: NOT AT ALL
SUM OF ALL RESPONSES TO PHQ QUESTIONS 1-9: 0
SUM OF ALL RESPONSES TO PHQ9 QUESTIONS 1 & 2: 0
SUM OF ALL RESPONSES TO PHQ9 QUESTIONS 1 & 2: 0
1. LITTLE INTEREST OR PLEASURE IN DOING THINGS: NOT AT ALL
SUM OF ALL RESPONSES TO PHQ QUESTIONS 1-9: 0
2. FEELING DOWN, DEPRESSED OR HOPELESS: NOT AT ALL
SUM OF ALL RESPONSES TO PHQ QUESTIONS 1-9: 0
1. LITTLE INTEREST OR PLEASURE IN DOING THINGS: NOT AT ALL

## 2025-02-03 ENCOUNTER — OFFICE VISIT (OUTPATIENT)
Dept: FAMILY MEDICINE CLINIC | Age: 70
End: 2025-02-03
Payer: COMMERCIAL

## 2025-02-03 VITALS
SYSTOLIC BLOOD PRESSURE: 134 MMHG | OXYGEN SATURATION: 98 % | WEIGHT: 237 LBS | HEART RATE: 87 BPM | BODY MASS INDEX: 31.28 KG/M2 | DIASTOLIC BLOOD PRESSURE: 80 MMHG

## 2025-02-03 DIAGNOSIS — M79.661 PAIN IN BOTH LOWER LEGS: ICD-10-CM

## 2025-02-03 DIAGNOSIS — I10 PRIMARY HYPERTENSION: ICD-10-CM

## 2025-02-03 DIAGNOSIS — F51.01 PRIMARY INSOMNIA: Primary | ICD-10-CM

## 2025-02-03 DIAGNOSIS — E78.2 MIXED HYPERLIPIDEMIA: ICD-10-CM

## 2025-02-03 DIAGNOSIS — M79.662 PAIN IN BOTH LOWER LEGS: ICD-10-CM

## 2025-02-03 PROCEDURE — 3079F DIAST BP 80-89 MM HG: CPT | Performed by: NURSE PRACTITIONER

## 2025-02-03 PROCEDURE — 99214 OFFICE O/P EST MOD 30 MIN: CPT | Performed by: NURSE PRACTITIONER

## 2025-02-03 PROCEDURE — 1123F ACP DISCUSS/DSCN MKR DOCD: CPT | Performed by: NURSE PRACTITIONER

## 2025-02-03 PROCEDURE — 3075F SYST BP GE 130 - 139MM HG: CPT | Performed by: NURSE PRACTITIONER

## 2025-02-03 ASSESSMENT — ENCOUNTER SYMPTOMS
SHORTNESS OF BREATH: 0
WHEEZING: 0

## 2025-02-03 NOTE — ASSESSMENT & PLAN NOTE
Continue trazodone for now- ok to try OTC sleep supplement relaxium- looks like it has magnesium, melatonin and some other herbs in it

## 2025-02-03 NOTE — PROGRESS NOTES
Patient: Walter Gan is a 70 y.o. male who presents today with the following Chief Complaint(s):  Chief Complaint   Patient presents with    6 Month Follow-Up         HPI  6 month follow up for meds  Insomnia: trazodone 100mg tablet. Has not been sleeping well- has trouble falling asleep  Trazodone not seeming to help anymore   HTN: stable on lisinopril-HCTZ 20-12.5mg ( takes 2 daily)   High cholesterol: tolerates lipitor 40mg daily  Leg pains: takes gabapentin 300mg nightly.- discussed he could try a magnesium supplement instead of the gabapentin. He states he was taking the gabapentin originally when he was working and on his feet so he is not sure if he really needs this or not  I discussed that possibly magnesium glycinate may be helpful for sleep and for is leg pains he was having.     Current Outpatient Medications   Medication Sig Dispense Refill    atorvastatin (LIPITOR) 40 MG tablet Take 1 tablet by mouth daily 90 tablet 3    gabapentin (NEURONTIN) 300 MG capsule TAKE 1 CAPSULE BY MOUTH IN THE  EVENING 90 capsule 3    lisinopril-hydroCHLOROthiazide (PRINZIDE;ZESTORETIC) 20-12.5 MG per tablet Take 2 tablets by mouth every morning 180 tablet 3    traZODone (DESYREL) 100 MG tablet TAKE 1 TABLET BY MOUTH AT NIGHT 90 tablet 3    ibuprofen (ADVIL;MOTRIN) 600 MG tablet Take 1 tablet by mouth 4 times daily as needed for Pain 120 tablet 0     No current facility-administered medications for this visit.       Patient's past medical history, surgical history, family history, medications,  andallergies  were all reviewed and updated as appropriate today.      Review of Systems   Respiratory:  Negative for shortness of breath and wheezing.    Cardiovascular:  Negative for chest pain and palpitations.   Musculoskeletal: Negative.    Neurological:  Negative for dizziness and headaches.   Psychiatric/Behavioral:  Positive for sleep disturbance.          Physical Exam  Vitals and nursing note reviewed.   Constitutional:

## 2025-05-13 RX ORDER — TRAZODONE HYDROCHLORIDE 100 MG/1
100 TABLET ORAL NIGHTLY
Qty: 90 TABLET | Refills: 3 | Status: SHIPPED | OUTPATIENT
Start: 2025-05-13

## 2025-05-13 NOTE — TELEPHONE ENCOUNTER
Last Office Visit  -  02/03/2025  Next Office Visit  -  n/a    Last Filled  -    Last UDS -    Contract -

## 2025-06-11 DIAGNOSIS — G89.29 CHRONIC LEFT SHOULDER PAIN: ICD-10-CM

## 2025-06-11 DIAGNOSIS — M25.512 CHRONIC LEFT SHOULDER PAIN: ICD-10-CM

## 2025-06-11 DIAGNOSIS — E78.2 MIXED HYPERLIPIDEMIA: ICD-10-CM

## 2025-06-11 DIAGNOSIS — I10 PRIMARY HYPERTENSION: ICD-10-CM

## 2025-06-12 RX ORDER — ATORVASTATIN CALCIUM 40 MG/1
40 TABLET, FILM COATED ORAL DAILY
Qty: 90 TABLET | Refills: 3 | Status: SHIPPED | OUTPATIENT
Start: 2025-06-12

## 2025-06-12 RX ORDER — GABAPENTIN 300 MG/1
CAPSULE ORAL
Qty: 90 CAPSULE | Refills: 3 | Status: SHIPPED | OUTPATIENT
Start: 2025-06-12 | End: 2025-09-10

## 2025-06-12 RX ORDER — LISINOPRIL AND HYDROCHLOROTHIAZIDE 12.5; 2 MG/1; MG/1
2 TABLET ORAL EVERY MORNING
Qty: 180 TABLET | Refills: 3 | Status: SHIPPED | OUTPATIENT
Start: 2025-06-12

## 2025-06-12 NOTE — TELEPHONE ENCOUNTER
Lisinopril-hctz  LAST REFILL 7-23-24  AMOUNT 180     3 REFILLS  LAST VISIT 2-3-25  NEXT VISIT n/a    Gabapentin 300 mg  LAST REFILL 7-23-24  AMOUNT 90     3 REFILLS  LAST VISIT 2-3-25  NEXT VISIT n/a    Atorvastatin 40  LAST REFILL 7-23-24  AMOUNT 90     3 REFILLS  LAST VISIT 2-3-25  NEXT VISIT n/a

## 2025-07-28 DIAGNOSIS — I10 PRIMARY HYPERTENSION: ICD-10-CM

## 2025-07-28 RX ORDER — LISINOPRIL AND HYDROCHLOROTHIAZIDE 12.5; 2 MG/1; MG/1
2 TABLET ORAL EVERY MORNING
Qty: 180 TABLET | Refills: 3 | Status: SHIPPED | OUTPATIENT
Start: 2025-07-28

## 2025-07-28 NOTE — TELEPHONE ENCOUNTER
Last Office Visit  -  2/3/25  Next Office Visit  -  n/a    Last Filled  -    Last UDS -    Contract -

## 2025-08-05 ENCOUNTER — TELEPHONE (OUTPATIENT)
Dept: FAMILY MEDICINE CLINIC | Age: 70
End: 2025-08-05

## 2025-08-05 ENCOUNTER — OFFICE VISIT (OUTPATIENT)
Dept: FAMILY MEDICINE CLINIC | Age: 70
End: 2025-08-05
Payer: MEDICARE

## 2025-08-05 VITALS
HEART RATE: 64 BPM | DIASTOLIC BLOOD PRESSURE: 82 MMHG | WEIGHT: 237 LBS | SYSTOLIC BLOOD PRESSURE: 140 MMHG | HEIGHT: 73 IN | OXYGEN SATURATION: 99 % | BODY MASS INDEX: 31.41 KG/M2

## 2025-08-05 DIAGNOSIS — Z13.1 DIABETES MELLITUS SCREENING: ICD-10-CM

## 2025-08-05 DIAGNOSIS — E78.00 HIGH CHOLESTEROL: ICD-10-CM

## 2025-08-05 DIAGNOSIS — M25.512 CHRONIC LEFT SHOULDER PAIN: ICD-10-CM

## 2025-08-05 DIAGNOSIS — I10 PRIMARY HYPERTENSION: ICD-10-CM

## 2025-08-05 DIAGNOSIS — L98.9 SKIN ABNORMALITY: ICD-10-CM

## 2025-08-05 DIAGNOSIS — E78.2 MIXED HYPERLIPIDEMIA: Primary | ICD-10-CM

## 2025-08-05 DIAGNOSIS — Z00.00 INITIAL MEDICARE ANNUAL WELLNESS VISIT: ICD-10-CM

## 2025-08-05 DIAGNOSIS — G89.29 CHRONIC LEFT SHOULDER PAIN: ICD-10-CM

## 2025-08-05 LAB
ALBUMIN SERPL-MCNC: 4.3 G/DL (ref 3.4–5)
ALBUMIN/GLOB SERPL: 1.7 {RATIO} (ref 1.1–2.2)
ALP SERPL-CCNC: 85 U/L (ref 40–129)
ALT SERPL-CCNC: 48 U/L (ref 10–40)
ANION GAP SERPL CALCULATED.3IONS-SCNC: 9 MMOL/L (ref 3–16)
AST SERPL-CCNC: 26 U/L (ref 15–37)
BASOPHILS # BLD: 0.1 K/UL (ref 0–0.2)
BASOPHILS NFR BLD: 0.8 %
BILIRUB SERPL-MCNC: 0.7 MG/DL (ref 0–1)
BUN SERPL-MCNC: 20 MG/DL (ref 7–20)
CALCIUM SERPL-MCNC: 9.9 MG/DL (ref 8.3–10.6)
CHLORIDE SERPL-SCNC: 104 MMOL/L (ref 99–110)
CHOLEST SERPL-MCNC: 123 MG/DL (ref 0–199)
CO2 SERPL-SCNC: 30 MMOL/L (ref 21–32)
CREAT SERPL-MCNC: 0.9 MG/DL (ref 0.8–1.3)
DEPRECATED RDW RBC AUTO: 13.7 % (ref 12.4–15.4)
EOSINOPHIL # BLD: 0.2 K/UL (ref 0–0.6)
EOSINOPHIL NFR BLD: 2.8 %
EST. AVERAGE GLUCOSE BLD GHB EST-MCNC: 108.3 MG/DL
GFR SERPLBLD CREATININE-BSD FMLA CKD-EPI: >90 ML/MIN/{1.73_M2}
GLUCOSE SERPL-MCNC: 100 MG/DL (ref 70–99)
HBA1C MFR BLD: 5.4 %
HCT VFR BLD AUTO: 47.3 % (ref 40.5–52.5)
HDLC SERPL-MCNC: 47 MG/DL (ref 40–60)
HGB BLD-MCNC: 16.2 G/DL (ref 13.5–17.5)
LDLC SERPL CALC-MCNC: 62 MG/DL
LYMPHOCYTES # BLD: 2 K/UL (ref 1–5.1)
LYMPHOCYTES NFR BLD: 24.2 %
MCH RBC QN AUTO: 29.9 PG (ref 26–34)
MCHC RBC AUTO-ENTMCNC: 34.2 G/DL (ref 31–36)
MCV RBC AUTO: 87.2 FL (ref 80–100)
MONOCYTES # BLD: 0.6 K/UL (ref 0–1.3)
MONOCYTES NFR BLD: 7.3 %
NEUTROPHILS # BLD: 5.4 K/UL (ref 1.7–7.7)
NEUTROPHILS NFR BLD: 64.9 %
PLATELET # BLD AUTO: 231 K/UL (ref 135–450)
PMV BLD AUTO: 8.1 FL (ref 5–10.5)
POTASSIUM SERPL-SCNC: 5 MMOL/L (ref 3.5–5.1)
PROT SERPL-MCNC: 6.8 G/DL (ref 6.4–8.2)
RBC # BLD AUTO: 5.42 M/UL (ref 4.2–5.9)
SODIUM SERPL-SCNC: 143 MMOL/L (ref 136–145)
TRIGL SERPL-MCNC: 70 MG/DL (ref 0–150)
VLDLC SERPL CALC-MCNC: 14 MG/DL
WBC # BLD AUTO: 8.2 K/UL (ref 4–11)

## 2025-08-05 PROCEDURE — 1123F ACP DISCUSS/DSCN MKR DOCD: CPT | Performed by: NURSE PRACTITIONER

## 2025-08-05 PROCEDURE — G0438 PPPS, INITIAL VISIT: HCPCS | Performed by: NURSE PRACTITIONER

## 2025-08-05 PROCEDURE — 3079F DIAST BP 80-89 MM HG: CPT | Performed by: NURSE PRACTITIONER

## 2025-08-05 PROCEDURE — 1160F RVW MEDS BY RX/DR IN RCRD: CPT | Performed by: NURSE PRACTITIONER

## 2025-08-05 PROCEDURE — 3077F SYST BP >= 140 MM HG: CPT | Performed by: NURSE PRACTITIONER

## 2025-08-05 PROCEDURE — 1159F MED LIST DOCD IN RCRD: CPT | Performed by: NURSE PRACTITIONER

## 2025-08-05 PROCEDURE — 3017F COLORECTAL CA SCREEN DOC REV: CPT | Performed by: NURSE PRACTITIONER

## 2025-08-05 RX ORDER — TRAZODONE HYDROCHLORIDE 100 MG/1
100 TABLET ORAL NIGHTLY
Qty: 90 TABLET | Refills: 3 | Status: SHIPPED | OUTPATIENT
Start: 2025-08-05

## 2025-08-05 RX ORDER — GABAPENTIN 300 MG/1
CAPSULE ORAL
Qty: 90 CAPSULE | Refills: 3 | Status: SHIPPED | OUTPATIENT
Start: 2025-08-05 | End: 2025-11-03

## 2025-08-05 RX ORDER — ATORVASTATIN CALCIUM 40 MG/1
40 TABLET, FILM COATED ORAL DAILY
Qty: 90 TABLET | Refills: 3 | Status: SHIPPED | OUTPATIENT
Start: 2025-08-05

## 2025-08-05 ASSESSMENT — PATIENT HEALTH QUESTIONNAIRE - PHQ9
SUM OF ALL RESPONSES TO PHQ QUESTIONS 1-9: 0
SUM OF ALL RESPONSES TO PHQ QUESTIONS 1-9: 0
2. FEELING DOWN, DEPRESSED OR HOPELESS: NOT AT ALL
SUM OF ALL RESPONSES TO PHQ QUESTIONS 1-9: 0
SUM OF ALL RESPONSES TO PHQ QUESTIONS 1-9: 0
1. LITTLE INTEREST OR PLEASURE IN DOING THINGS: NOT AT ALL

## 2025-08-05 ASSESSMENT — LIFESTYLE VARIABLES
HOW OFTEN DO YOU HAVE A DRINK CONTAINING ALCOHOL: MONTHLY OR LESS
HOW MANY STANDARD DRINKS CONTAINING ALCOHOL DO YOU HAVE ON A TYPICAL DAY: 1 OR 2

## (undated) DEVICE — Device

## (undated) DEVICE — SUTURE PERMA-HAND SZ 4-0 L18IN NONABSORBABLE BLK L13MM P-3 641G

## (undated) DEVICE — TUBING, SUCTION, 3/16" X 12', STRAIGHT: Brand: MEDLINE

## (undated) DEVICE — INSTRUMENT TRACKER 9733533XOM ENT 1PK

## (undated) DEVICE — GLOVE SURG SZ 6 THK91MIL LTX FREE SYN POLYISOPRENE ANTI

## (undated) DEVICE — PACK PROCEDURE SURG ORAL CDS

## (undated) DEVICE — STANDARD HYPODERMIC NEEDLE,POLYPROPYLENE HUB: Brand: MONOJECT

## (undated) DEVICE — SUTURE CHROMIC GUT SZ 4-0 L18IN ABSRB BRN L13MM P-3 3/8 CIR 1654G

## (undated) DEVICE — BLADE 1884006EM RAD40 4MM M4 ROTATE ROHS: Brand: FUSION®

## (undated) DEVICE — PATIENT TRACKER 9734887XOM NON-INVASIVE

## (undated) DEVICE — CODMAN® SURGICAL PATTIES 1/2" X 3" (1.27CM X 7.62CM): Brand: CODMAN®

## (undated) DEVICE — SOLUTION IV IRRIG POUR BRL 0.9% SODIUM CHL 2F7124

## (undated) DEVICE — RENTAL CO2 OMNIGUIDE

## (undated) DEVICE — AGENT HEMSTAT W4XL8IN OXIDIZED REGENERATED CELOS ABSRB

## (undated) DEVICE — BLADE OPHTH 60DEG BLU DBL BVL GRINDLESS SHRP 180DEG CUT

## (undated) DEVICE — SHEATH 197230BVA 5PK ES2 STZ REV 4MM/30D: Brand: ENDO-SCRUB®

## (undated) DEVICE — SPLINT 1524055 DOYLE II AIRWAY SET: Brand: DOYLE II ™

## (undated) DEVICE — SYRINGE BULB 50/CS 48/PLT: Brand: MEDEGEN MEDICAL PRODUCTS, LLC

## (undated) DEVICE — SUTURE ABSORBABLE MONOFILAMENT 4-0 SC-1 18 IN PLN GUT 1824H

## (undated) DEVICE — ANTI-FOG SOLUTION WITH FOAM PAD: Brand: DEVON

## (undated) DEVICE — SOLUTION IV 500ML 0.9% SOD CHL PH 5 INJ USP VIAFLX PLAS

## (undated) DEVICE — KIT,ANTI FOG,W/SPONGE & FLUID,SOFT PACK: Brand: MEDLINE

## (undated) DEVICE — BLADE 1884004HR TRICUT 5PK M4 4MM ROTATE: Brand: TRICUT

## (undated) DEVICE — PAD,NON-ADHERENT,3X8,STERILE,LF,1/PK: Brand: MEDLINE

## (undated) DEVICE — SYRINGE MED 10ML TRNSLUC BRL PLUNG BLK MRK POLYPR CTRL